# Patient Record
Sex: MALE | Race: WHITE | NOT HISPANIC OR LATINO | Employment: OTHER | URBAN - METROPOLITAN AREA
[De-identification: names, ages, dates, MRNs, and addresses within clinical notes are randomized per-mention and may not be internally consistent; named-entity substitution may affect disease eponyms.]

---

## 2017-01-05 ENCOUNTER — TRANSFERRED RECORDS (OUTPATIENT)
Dept: HEALTH INFORMATION MANAGEMENT | Facility: CLINIC | Age: 57
End: 2017-01-05

## 2017-10-11 DIAGNOSIS — M1A.9XX0 CHRONIC GOUT INVOLVING TOE WITHOUT TOPHUS, UNSPECIFIED CAUSE, UNSPECIFIED LATERALITY: ICD-10-CM

## 2017-10-11 NOTE — TELEPHONE ENCOUNTER
allopurinol (ZYLOPRIM) 300 MG tablet       Last Written Prescription Date: 10/10/2016  Last Fill Quantity: 90 tablet, # refills: 3  Last Office Visit with G, P or [facility] prescribing provider:  10/10/2016        Uric Acid   Date Value Ref Range Status   03/13/2015 4.5 3.5 - 7.2 mg/dL Final     Comment:     Effective 7/30/2014, the reference range for this assay has changed to reflect   new instrumentation/methodology.     ]  Creatinine   Date Value Ref Range Status   03/13/2015 0.95 0.66 - 1.25 mg/dL Final   ]  Lab Results   Component Value Date    WBC 6.1 10/21/2015     Lab Results   Component Value Date    RBC 4.53 10/21/2015     Lab Results   Component Value Date    HGB 10.6 10/21/2015     Lab Results   Component Value Date    HCT 35.4 10/21/2015     No components found for: MCT  Lab Results   Component Value Date    MCV 78 10/21/2015     Lab Results   Component Value Date    MCH 23.4 10/21/2015     Lab Results   Component Value Date    MCHC 29.9 10/21/2015     Lab Results   Component Value Date    RDW 17.7 10/21/2015     Lab Results   Component Value Date     10/21/2015     No results found for: AST  No results found for: ALT

## 2017-10-12 RX ORDER — ALLOPURINOL 300 MG/1
TABLET ORAL
Qty: 90 TABLET | Refills: 0 | Status: SHIPPED | OUTPATIENT
Start: 2017-10-12 | End: 2018-01-04

## 2017-12-07 DIAGNOSIS — N52.9 ERECTILE DYSFUNCTION, UNSPECIFIED ERECTILE DYSFUNCTION TYPE: ICD-10-CM

## 2017-12-07 DIAGNOSIS — I10 ESSENTIAL HYPERTENSION, BENIGN: ICD-10-CM

## 2017-12-07 RX ORDER — SILDENAFIL CITRATE 100 MG
TABLET ORAL
Qty: 6 TABLET | Refills: 0 | Status: SHIPPED | OUTPATIENT
Start: 2017-12-07 | End: 2018-01-04

## 2017-12-07 RX ORDER — LISINOPRIL 10 MG/1
TABLET ORAL
Qty: 30 TABLET | Refills: 0 | Status: SHIPPED | OUTPATIENT
Start: 2017-12-07 | End: 2018-01-04

## 2017-12-26 ENCOUNTER — OFFICE VISIT (OUTPATIENT)
Dept: FAMILY MEDICINE | Facility: CLINIC | Age: 57
End: 2017-12-26
Payer: COMMERCIAL

## 2017-12-26 VITALS
HEART RATE: 68 BPM | HEIGHT: 71 IN | WEIGHT: 266 LBS | RESPIRATION RATE: 18 BRPM | OXYGEN SATURATION: 97 % | SYSTOLIC BLOOD PRESSURE: 150 MMHG | TEMPERATURE: 97.7 F | BODY MASS INDEX: 37.24 KG/M2 | DIASTOLIC BLOOD PRESSURE: 100 MMHG

## 2017-12-26 DIAGNOSIS — R05.9 COUGH: ICD-10-CM

## 2017-12-26 DIAGNOSIS — J01.90 ACUTE SINUSITIS WITH SYMPTOMS > 10 DAYS: Primary | ICD-10-CM

## 2017-12-26 LAB
FLUAV+FLUBV AG SPEC QL: NEGATIVE
FLUAV+FLUBV AG SPEC QL: NEGATIVE
SPECIMEN SOURCE: NORMAL

## 2017-12-26 PROCEDURE — 87804 INFLUENZA ASSAY W/OPTIC: CPT | Performed by: FAMILY MEDICINE

## 2017-12-26 PROCEDURE — 99213 OFFICE O/P EST LOW 20 MIN: CPT | Performed by: FAMILY MEDICINE

## 2017-12-26 NOTE — PATIENT INSTRUCTIONS
Sinusitis (Antibiotic Treatment)    The sinuses are air-filled spaces within the bones of the face. They connect to the inside of the nose. Sinusitis is an inflammation of the tissue lining the sinus cavity. Sinus inflammation can occur during a cold. It can also be due to allergies to pollens and other particles in the air. Sinusitis can cause symptoms of sinus congestion and fullness. A sinus infection causes fever, headache and facial pain. There is often green or yellow drainage from the nose or into the back of the throat (post-nasal drip). You have been given antibiotics to treat this condition.  Home care:    Take the full course of antibiotics as instructed. Do not stop taking them, even if you feel better.    Drink plenty of water, hot tea, and other liquids. This may help thin mucus. It also may promote sinus drainage.    Heat may help soothe painful areas of the face. Use a towel soaked in hot water. Or,  the shower and direct the hot spray onto your face. Using a vaporizer along with a menthol rub at night may also help.     An expectorant containing guaifenesin may help thin the mucus and promote drainage from the sinuses.    Over-the-counter decongestants may be used unless a similar medicine was prescribed. Nasal sprays work the fastest. Use one that contains phenylephrine or oxymetazoline. First blow the nose gently. Then use the spray. Do not use these medicines more often than directed on the label or symptoms may get worse. You may also use tablets containing pseudoephedrine. Avoid products that combine ingredients, because side effects may be increased. Read labels. You can also ask the pharmacist for help. (NOTE: Persons with high blood pressure should not use decongestants. They can raise blood pressure.)    Over-the-counter antihistamines may help if allergies contributed to your sinusitis.      Do not use nasal rinses or irrigation during an acute sinus infection, unless told to by  your health care provider. Rinsing may spread the infection to other sinuses.    Use acetaminophen or ibuprofen to control pain, unless another pain medicine was prescribed. (If you have chronic liver or kidney disease or ever had a stomach ulcer, talk with your doctor before using these medicines. Aspirin should never be used in anyone under 18 years of age who is ill with a fever. It may cause severe liver damage.)    Don't smoke. This can worsen symptoms.  Follow-up care  Follow up with your healthcare provider or our staff if you are not improving within the next week.  When to seek medical advice  Call your healthcare provider if any of these occur:    Facial pain or headache becoming more severe    Stiff neck    Unusual drowsiness or confusion    Swelling of the forehead or eyelids    Vision problems, including blurred or double vision    Fever of 100.4 F (38 C) or higher, or as directed by your healthcare provider    Seizure    Breathing problems    Symptoms not resolving within 10 days  Date Last Reviewed: 4/13/2015 2000-2017 [employer]. [address] [place] [address]. All rights reserved. This information is not intended as a substitute for professional medical care. Always follow your healthcare professional's instructions.        Amoxicillin; Clavulanic Acid tablets  Brand Name: Augmentin  What is this medicine?  AMOXICILLIN; CLAVULANIC ACID (a mox i LAWANDA in; SUZIE silveira ic AS id) is a penicillin antibiotic. It is used to treat certain kinds of bacterial infections. It will not work for colds, flu, or other viral infections.  How should I use this medicine?  Take this medicine by mouth with a full glass of water. Follow the directions on the prescription label. Take at the start of a meal. Do not crush or chew. If the tablet has a score line, you may cut it in half at the score line for easier swallowing. Take your medicine at regular intervals. Do not take your medicine more  often than directed. Take all of your medicine as directed even if you think you are better. Do not skip doses or stop your medicine early.  Talk to your pediatrician regarding the use of this medicine in children. Special care may be needed.  What side effects may I notice from receiving this medicine?  Side effects that you should report to your doctor or health care professional as soon as possible:    allergic reactions like skin rash, itching or hives, swelling of the face, lips, or tongue    breathing problems    dark urine    fever or chills, sore throat    redness, blistering, peeling or loosening of the skin, including inside the mouth    seizures    trouble passing urine or change in the amount of urine    unusual bleeding, bruising    unusually weak or tired    white patches or sores in the mouth or throat  Side effects that usually do not require medical attention (report to your doctor or health care professional if they continue or are bothersome):    diarrhea    dizziness    headache    nausea, vomiting    stomach upset    vaginal or anal irritation  What may interact with this medicine?    allopurinol    anticoagulants    birth control pills    methotrexate    probenecid  What if I miss a dose?  If you miss a dose, take it as soon as you can. If it is almost time for your next dose, take only that dose. Do not take double or extra doses.  Where should I keep my medicine?  Keep out of the reach of children.  Store at room temperature below 25 degrees C (77 degrees F). Keep container tightly closed. Throw away any unused medicine after the expiration date.  What should I tell my health care provider before I take this medicine?  They need to know if you have any of these conditions:    bowel disease, like colitis    kidney disease    liver disease    mononucleosis    an unusual or allergic reaction to amoxicillin, penicillin, cephalosporin, other antibiotics, clavulanic acid, other medicines, foods,  dyes, or preservatives    pregnant or trying to get pregnant    breast-feeding  What should I watch for while using this medicine?  Tell your doctor or health care professional if your symptoms do not improve.  Do not treat diarrhea with over the counter products. Contact your doctor if you have diarrhea that lasts more than 2 days or if it is severe and watery.  If you have diabetes, you may get a false-positive result for sugar in your urine. Check with your doctor or health care professional.  Birth control pills may not work properly while you are taking this medicine. Talk to your doctor about using an extra method of birth control.  NOTE:This sheet is a summary. It may not cover all possible information. If you have questions about this medicine, talk to your doctor, pharmacist, or health care provider. Copyright  2017 [employer]

## 2017-12-26 NOTE — NURSING NOTE
"Chief Complaint   Patient presents with     URI       Initial BP (!) 150/100 (Cuff Size: Adult Regular)  Pulse 68  Temp 97.7  F (36.5  C) (Tympanic)  Resp 18  Ht 5' 11\" (1.803 m)  Wt 266 lb (120.7 kg)  SpO2 97%  BMI 37.1 kg/m2 Estimated body mass index is 37.1 kg/(m^2) as calculated from the following:    Height as of this encounter: 5' 11\" (1.803 m).    Weight as of this encounter: 266 lb (120.7 kg).  Medication Reconciliation: complete    Health Maintenance that is potentially due pending provider review:  NONE    n/a    Is there anyone who you would like to be able to receive your results? Not Applicable  If yes have patient fill out CECILIO    "

## 2017-12-26 NOTE — MR AVS SNAPSHOT
After Visit Summary   12/26/2017    Shon Sargent    MRN: [id number]           Patient Information     Date Of Birth          1960        Visit Information        Provider Department      12/26/2017 8:20 AM Oral Christensen MD [facility]        Today's Diagnoses     Cough    -  1    Acute sinusitis with symptoms > 10 days          Care Instructions      Sinusitis (Antibiotic Treatment)    The sinuses are air-filled spaces within the bones of the face. They connect to the inside of the nose. Sinusitis is an inflammation of the tissue lining the sinus cavity. Sinus inflammation can occur during a cold. It can also be due to allergies to pollens and other particles in the air. Sinusitis can cause symptoms of sinus congestion and fullness. A sinus infection causes fever, headache and facial pain. There is often green or yellow drainage from the nose or into the back of the throat (post-nasal drip). You have been given antibiotics to treat this condition.  Home care:    Take the full course of antibiotics as instructed. Do not stop taking them, even if you feel better.    Drink plenty of water, hot tea, and other liquids. This may help thin mucus. It also may promote sinus drainage.    Heat may help soothe painful areas of the face. Use a towel soaked in hot water. Or,  the shower and direct the hot spray onto your face. Using a vaporizer along with a menthol rub at night may also help.     An expectorant containing guaifenesin may help thin the mucus and promote drainage from the sinuses.    Over-the-counter decongestants may be used unless a similar medicine was prescribed. Nasal sprays work the fastest. Use one that contains phenylephrine or oxymetazoline. First blow the nose gently. Then use the spray. Do not use these medicines more often than directed on the label or symptoms may get worse. You may also use tablets containing pseudoephedrine. Avoid products that combine  ingredients, because side effects may be increased. Read labels. You can also ask the pharmacist for help. (NOTE: Persons with high blood pressure should not use decongestants. They can raise blood pressure.)    Over-the-counter antihistamines may help if allergies contributed to your sinusitis.      Do not use nasal rinses or irrigation during an acute sinus infection, unless told to by your health care provider. Rinsing may spread the infection to other sinuses.    Use acetaminophen or ibuprofen to control pain, unless another pain medicine was prescribed. (If you have chronic liver or kidney disease or ever had a stomach ulcer, talk with your doctor before using these medicines. Aspirin should never be used in anyone under 18 years of age who is ill with a fever. It may cause severe liver damage.)    Don't smoke. This can worsen symptoms.  Follow-up care  Follow up with your healthcare provider or our staff if you are not improving within the next week.  When to seek medical advice  Call your healthcare provider if any of these occur:    Facial pain or headache becoming more severe    Stiff neck    Unusual drowsiness or confusion    Swelling of the forehead or eyelids    Vision problems, including blurred or double vision    Fever of 100.4 F (38 C) or higher, or as directed by your healthcare provider    Seizure    Breathing problems    Symptoms not resolving within 10 days  Date Last Reviewed: 4/13/2015 2000-2017 [employer]. [address] [place] [address], [place] [address]. All rights reserved. This information is not intended as a substitute for professional medical care. Always follow your healthcare professional's instructions.        Amoxicillin; Clavulanic Acid tablets  Brand Name: Augmentin  What is this medicine?  AMOXICILLIN; CLAVULANIC ACID (a mox i LAWANDA in; SUZIE cisse AS id) is a penicillin antibiotic. It is used to treat certain kinds of bacterial infections. It will not work for colds,  flu, or other viral infections.  How should I use this medicine?  Take this medicine by mouth with a full glass of water. Follow the directions on the prescription label. Take at the start of a meal. Do not crush or chew. If the tablet has a score line, you may cut it in half at the score line for easier swallowing. Take your medicine at regular intervals. Do not take your medicine more often than directed. Take all of your medicine as directed even if you think you are better. Do not skip doses or stop your medicine early.  Talk to your pediatrician regarding the use of this medicine in children. Special care may be needed.  What side effects may I notice from receiving this medicine?  Side effects that you should report to your doctor or health care professional as soon as possible:    allergic reactions like skin rash, itching or hives, swelling of the face, lips, or tongue    breathing problems    dark urine    fever or chills, sore throat    redness, blistering, peeling or loosening of the skin, including inside the mouth    seizures    trouble passing urine or change in the amount of urine    unusual bleeding, bruising    unusually weak or tired    white patches or sores in the mouth or throat  Side effects that usually do not require medical attention (report to your doctor or health care professional if they continue or are bothersome):    diarrhea    dizziness    headache    nausea, vomiting    stomach upset    vaginal or anal irritation  What may interact with this medicine?    allopurinol    anticoagulants    birth control pills    methotrexate    probenecid  What if I miss a dose?  If you miss a dose, take it as soon as you can. If it is almost time for your next dose, take only that dose. Do not take double or extra doses.  Where should I keep my medicine?  Keep out of the reach of children.  Store at room temperature below 25 degrees C (77 degrees F). Keep container tightly closed. Throw away any  unused medicine after the expiration date.  What should I tell my health care provider before I take this medicine?  They need to know if you have any of these conditions:    bowel disease, like colitis    kidney disease    liver disease    mononucleosis    an unusual or allergic reaction to amoxicillin, penicillin, cephalosporin, other antibiotics, clavulanic acid, other medicines, foods, dyes, or preservatives    pregnant or trying to get pregnant    breast-feeding  What should I watch for while using this medicine?  Tell your doctor or health care professional if your symptoms do not improve.  Do not treat diarrhea with over the counter products. Contact your doctor if you have diarrhea that lasts more than 2 days or if it is severe and watery.  If you have diabetes, you may get a false-positive result for sugar in your urine. Check with your doctor or health care professional.  Birth control pills may not work properly while you are taking this medicine. Talk to your doctor about using an extra method of birth control.  NOTE:This sheet is a summary. It may not cover all possible information. If you have questions about this medicine, talk to your doctor, pharmacist, or health care provider. Copyright  2017 [employer]                Follow-ups after your visit        Who to contact     If you have questions or need follow up information about today's clinic visit or your schedule please contact [facility] directly at [phone].  Normal or non-critical lab and imaging results will be communicated to you by MyChart, letter or phone within 4 business days after the clinic has received the results. If you do not hear from us within 7 days, please contact the clinic through [employer] or phone. If you have a critical or abnormal lab result, we will notify you by phone as soon as possible.  Submit refill requests through [employer] or call your pharmacy and they will forward the refill request to us. Please  "allow 3 business days for your refill to be completed.          Additional Information About Your Visit        [employer] Information     [employer] gives you secure access to your electronic health record. If you see a primary care provider, you can also send messages to your care team and make appointments. If you have questions, please call your primary care clinic.  If you do not have a primary care provider, please call [phone] and they will assist you.        Care EveryWhere ID     This is your Care EveryWhere ID. This could be used by other organizations to access your [facility] medical records  [id number]        Your Vitals Were     Pulse Temperature Respirations Height Pulse Oximetry BMI (Body Mass Index)    68 97.7  F (36.5  C) (Tympanic) 18 5' 11\" (1.803 m) 97% 37.1 kg/m2       Blood Pressure from Last 3 Encounters:   12/26/17 (!) 150/100   10/10/16 130/86   09/23/16 140/90    Weight from Last 3 Encounters:   12/26/17 266 lb (120.7 kg)   10/10/16 253 lb (114.8 kg)   09/23/16 258 lb 3.2 oz (117.1 kg)              We Performed the Following     Influenza A/B antigen          Today's Medication Changes          These changes are accurate as of: 12/26/17  9:08 AM.  If you have any questions, ask your nurse or doctor.               Start taking these medicines.        Dose/Directions    amoxicillin-clavulanate 875-125 MG per tablet   Commonly known as:  AUGMENTIN   Used for:  Acute sinusitis with symptoms > 10 days   Started by:  Oral Christensen MD        Dose:  1 tablet   Take 1 tablet by mouth 2 times daily   Quantity:  20 tablet   Refills:  0         Stop taking these medicines if you haven't already. Please contact your care team if you have questions.     betamethasone dipropionate 0.05 % cream   Commonly known as:  DIPROSONE   Stopped by:  Oral Christensen MD                Where to get your medicines      These medications were sent to [facility] [address] [place] [address] " [facility] [address]     Phone:  [phone]     amoxicillin-clavulanate 875-125 MG per tablet                Primary Care Provider Office Phone # Fax #    Andrea Sandhu [phone] [address] [address]        Equal Access to Services     CHAPARRITA CORDON : Hadii aad ku hadasho Soomaali, waaxda luqadaha, qaybta kaalmada adeegyada, waxay akilin hayaan adelubna khsamanthakiara laankit . So [facility] [phone].    ATENCIÓN: Si habla español, tiene a daniel disposición servicios gratuitos de asistencia lingüística. [facility] [phone].    We comply with applicable federal civil rights laws and [place] laws. We do not discriminate on the basis of race, color, national origin, age, disability, sex, sexual orientation, or gender identity.            Thank you!     Thank you for choosing [facility]  for your care. Our goal is always to provide you with excellent care. Hearing back from our patients is one way we can continue to improve our services. Please take a few minutes to complete the written survey that you may receive in the mail after your visit with us. Thank you!             Your Updated Medication List - Protect others around you: Learn how to safely use, store and throw away your medicines at www.disposemymeds.org.          This list is accurate as of: 12/26/17  9:08 AM.  Always use your most recent med list.                   Brand Name Dispense Instructions for use Diagnosis    albuterol 108 (90 BASE) MCG/ACT Inhaler    PROAIR HFA/PROVENTIL HFA/VENTOLIN HFA    1 Inhaler    Inhale 2 puffs into the lungs every 6 hours as needed for shortness of breath / dyspnea or wheezing    Acute bronchitis, unspecified organism       allopurinol 300 MG tablet    ZYLOPRIM    90 tablet    TAKE ONE TABLET BY MOUTH ONCE DAILY    Chronic gout involving toe without tophus, unspecified cause, unspecified laterality       amoxicillin-clavulanate 875-125 MG per tablet    AUGMENTIN     20 tablet    Take 1 tablet by mouth 2 times daily    Acute sinusitis with symptoms > 10 days       ferrous gluconate 325 (36 FE) MG Tabs      Take 1 tablet by mouth daily        lisinopril 10 MG tablet    PRINIVIL/ZESTRIL    30 tablet    TAKE ONE TABLET BY MOUTH ONCE DAILY    Essential hypertension, benign       omeprazole 40 MG capsule    priLOSEC    90 capsule    Take 1 capsule (40 mg) by mouth daily Take 30-60 minutes before a meal.    Gastroesophageal reflux disease without esophagitis, Gastrointestinal hemorrhage, unspecified gastrointestinal hemorrhage type       VIAGRA 100 MG tablet   Generic drug:  sildenafil     6 tablet    TAKE ONE TABLET BY MOUTH ONCE DAILY AS NEEDED    Erectile dysfunction, unspecified erectile dysfunction type

## 2017-12-26 NOTE — PROGRESS NOTES
SUBJECTIVE:   Shon Sargent is a 57 year old male who presents to clinic today for the following health issues:      RESPIRATORY SYMPTOMS      Duration: 10 days     Description  nasal congestion, rhinorrhea, facial pain/pressure, cough, wheezing, fever, chills, ear pain both, headache and hoarse voice    Severity: moderate    Accompanying signs and symptoms: None    History (predisposing factors):  Kids have sinus and ear infections     Precipitating or alleviating factors: None    Therapies tried and outcome:  rest and fluids, mucinex, advil, inhaler         Problem list and histories reviewed & adjusted, as indicated.  Additional history: as documented    Patient Active Problem List   Diagnosis     Hypertension goal BP (blood pressure) < 140/90     Tremor     Gout     ED (erectile dysfunction)     Psoriasis     Esophageal reflux (GERD)     CARDIOVASCULAR SCREENING; LDL GOAL LESS THAN 130     Iron deficiency anemia     Past Surgical History:   Procedure Laterality Date     COLONOSCOPY N/A 10/8/2015    Procedure: COLONOSCOPY;  Surgeon: Rolando Ayers MD;  Location: [facility]     ESOPHAGOSCOPY, GASTROSCOPY, DUODENOSCOPY (EGD), COMBINED N/A 11/3/2014    Procedure: COMBINED ESOPHAGOSCOPY, GASTROSCOPY, DUODENOSCOPY (EGD);  Surgeon: Rolando Ayers MD;  Location: [facility]     ESOPHAGOSCOPY, GASTROSCOPY, DUODENOSCOPY (EGD), COMBINED N/A 10/8/2015    Procedure: COMBINED ESOPHAGOSCOPY, GASTROSCOPY, DUODENOSCOPY (EGD), BIOPSY SINGLE OR MULTIPLE;  Surgeon: Rolando Ayers MD;  Location: [facility]     HERNIA REPAIR  2007    umbilical       Social History   Substance Use Topics     Smoking status: Never Smoker     Smokeless tobacco: Never Used     Alcohol use Yes      Comment: occ'l     Family History   Problem Relation Age of Onset     CANCER Mother      lymphoma     Hypertension Father      Circulatory Father      GASTROINTESTINAL DISEASE Father      Lipids Father      Alzheimer Disease Maternal Grandfather      Anesthesia Reaction  "Maternal Grandfather      Cardiovascular Paternal Grandfather      HEART DISEASE Paternal Grandfather      Asthma Son      Asthma Son          Current Outpatient Prescriptions   Medication Sig Dispense Refill     lisinopril (PRINIVIL/ZESTRIL) 10 MG tablet TAKE ONE TABLET BY MOUTH ONCE DAILY 30 tablet 0     allopurinol (ZYLOPRIM) 300 MG tablet TAKE ONE TABLET BY MOUTH ONCE DAILY 90 tablet 0     omeprazole (PRILOSEC) 40 MG capsule Take 1 capsule (40 mg) by mouth daily Take 30-60 minutes before a meal. 90 capsule 3     albuterol (PROAIR HFA, PROVENTIL HFA, VENTOLIN HFA) 108 (90 BASE) MCG/ACT inhaler Inhale 2 puffs into the lungs every 6 hours as needed for shortness of breath / dyspnea or wheezing 1 Inhaler 0     ferrous gluconate 325 (36 FE) MG TABS Take 1 tablet by mouth daily       VIAGRA 100 MG tablet TAKE ONE TABLET BY MOUTH ONCE DAILY AS NEEDED (Patient not taking: Reported on 12/26/2017) 6 tablet 0     Allergies   Allergen Reactions     Valdecoxib Hives     Recent Labs   Lab Test  03/13/15   0848   LDL  64   HDL  28*   TRIG  163*   CR  0.95   GFRESTIMATED  83   GFRESTBLACK  >90   GFR Calc     POTASSIUM  4.7      BP Readings from Last 3 Encounters:   12/26/17 (!) 150/100   10/10/16 130/86   09/23/16 140/90    Wt Readings from Last 3 Encounters:   12/26/17 266 lb (120.7 kg)   10/10/16 253 lb (114.8 kg)   09/23/16 258 lb 3.2 oz (117.1 kg)                  Labs reviewed in EPIC          Reviewed and updated as needed this visit by clinical staff     Reviewed and updated as needed this visit by Provider         ROS:  Constitutional, HEENT, cardiovascular, pulmonary, gi and gu systems are negative, except as otherwise noted.      OBJECTIVE:   BP (!) 150/100 (Cuff Size: Adult Regular)  Pulse 68  Temp 97.7  F (36.5  C) (Tympanic)  Resp 18  Ht 5' 11\" (1.803 m)  Wt 266 lb (120.7 kg)  SpO2 97%  BMI 37.1 kg/m2  Body mass index is 37.1 kg/(m^2).  GENERAL: alert, no distress and obese  EYES: Eyes " grossly normal to inspection, PERRL and conjunctivae and sclerae normal  HENT: normal cephalic/atraumatic, ear canals and TM's normal, nasal mucosa edematous , oral mucous membranes moist, oropharxnx crowded and sinuses: maxillary pressure   NECK: no adenopathy, no asymmetry, masses, or scars and thyroid normal to palpation  RESP: lungs clear to auscultation - no rales, rhonchi or wheezes  CV: regular rates and rhythm, normal S1 S2, no S3 or S4 and no murmur, click or rub  ABDOMEN: soft, nontender, no hepatosplenomegaly, no masses and bowel sounds normal  MS: no gross musculoskeletal defects noted, no edema  NEURO: Normal strength and tone, mentation intact and speech normal  LYMPH: no cervical, supraclavicular, axillary, or inguinal adenopathy    Results for orders placed or performed in visit on 12/26/17   Influenza A/B antigen   Result Value Ref Range    Influenza A/B Agn Specimen Nasal     Influenza A Negative NEG^Negative    Influenza B Negative NEG^Negative         ASSESSMENT/PLAN:       ICD-10-CM    1. Acute sinusitis with symptoms > 10 days J01.90 amoxicillin-clavulanate (AUGMENTIN) 875-125 MG per tablet   2. Cough R05 Influenza A/B antigen       Discussed in detail differentials and further management. Symptoms are likely secondary to acute sinusitis. Augmentin prescribed, common side effects discussed. Recommended well hydration, over-the-counter analgesia, warm fluids. Written instructions/information provided. Patient understood and in agreement with the above plan. All questions are answered. Follow-up if symptoms persist or worsen.        Patient Instructions     Sinusitis (Antibiotic Treatment)    The sinuses are air-filled spaces within the bones of the face. They connect to the inside of the nose. Sinusitis is an inflammation of the tissue lining the sinus cavity. Sinus inflammation can occur during a cold. It can also be due to allergies to pollens and other particles in the air. Sinusitis can  cause symptoms of sinus congestion and fullness. A sinus infection causes fever, headache and facial pain. There is often green or yellow drainage from the nose or into the back of the throat (post-nasal drip). You have been given antibiotics to treat this condition.  Home care:    Take the full course of antibiotics as instructed. Do not stop taking them, even if you feel better.    Drink plenty of water, hot tea, and other liquids. This may help thin mucus. It also may promote sinus drainage.    Heat may help soothe painful areas of the face. Use a towel soaked in hot water. Or,  the shower and direct the hot spray onto your face. Using a vaporizer along with a menthol rub at night may also help.     An expectorant containing guaifenesin may help thin the mucus and promote drainage from the sinuses.    Over-the-counter decongestants may be used unless a similar medicine was prescribed. Nasal sprays work the fastest. Use one that contains phenylephrine or oxymetazoline. First blow the nose gently. Then use the spray. Do not use these medicines more often than directed on the label or symptoms may get worse. You may also use tablets containing pseudoephedrine. Avoid products that combine ingredients, because side effects may be increased. Read labels. You can also ask the pharmacist for help. (NOTE: Persons with high blood pressure should not use decongestants. They can raise blood pressure.)    Over-the-counter antihistamines may help if allergies contributed to your sinusitis.      Do not use nasal rinses or irrigation during an acute sinus infection, unless told to by your health care provider. Rinsing may spread the infection to other sinuses.    Use acetaminophen or ibuprofen to control pain, unless another pain medicine was prescribed. (If you have chronic liver or kidney disease or ever had a stomach ulcer, talk with your doctor before using these medicines. Aspirin should never be used in anyone  under 18 years of age who is ill with a fever. It may cause severe liver damage.)    Don't smoke. This can worsen symptoms.  Follow-up care  Follow up with your healthcare provider or our staff if you are not improving within the next week.  When to seek medical advice  Call your healthcare provider if any of these occur:    Facial pain or headache becoming more severe    Stiff neck    Unusual drowsiness or confusion    Swelling of the forehead or eyelids    Vision problems, including blurred or double vision    Fever of 100.4 F (38 C) or higher, or as directed by your healthcare provider    Seizure    Breathing problems    Symptoms not resolving within 10 days  Date Last Reviewed: 4/13/2015 2000-2017 [employer]. [address] [place] [address], [place] [address]. All rights reserved. This information is not intended as a substitute for professional medical care. Always follow your healthcare professional's instructions.        Amoxicillin; Clavulanic Acid tablets  Brand Name: Augmentin  What is this medicine?  AMOXICILLIN; CLAVULANIC ACID (a mox i LAWANDA in; SUZIE silveira ic AS id) is a penicillin antibiotic. It is used to treat certain kinds of bacterial infections. It will not work for colds, flu, or other viral infections.  How should I use this medicine?  Take this medicine by mouth with a full glass of water. Follow the directions on the prescription label. Take at the start of a meal. Do not crush or chew. If the tablet has a score line, you may cut it in half at the score line for easier swallowing. Take your medicine at regular intervals. Do not take your medicine more often than directed. Take all of your medicine as directed even if you think you are better. Do not skip doses or stop your medicine early.  Talk to your pediatrician regarding the use of this medicine in children. Special care may be needed.  What side effects may I notice from receiving this medicine?  Side effects that you should  report to your doctor or health care professional as soon as possible:    allergic reactions like skin rash, itching or hives, swelling of the face, lips, or tongue    breathing problems    dark urine    fever or chills, sore throat    redness, blistering, peeling or loosening of the skin, including inside the mouth    seizures    trouble passing urine or change in the amount of urine    unusual bleeding, bruising    unusually weak or tired    white patches or sores in the mouth or throat  Side effects that usually do not require medical attention (report to your doctor or health care professional if they continue or are bothersome):    diarrhea    dizziness    headache    nausea, vomiting    stomach upset    vaginal or anal irritation  What may interact with this medicine?    allopurinol    anticoagulants    birth control pills    methotrexate    probenecid  What if I miss a dose?  If you miss a dose, take it as soon as you can. If it is almost time for your next dose, take only that dose. Do not take double or extra doses.  Where should I keep my medicine?  Keep out of the reach of children.  Store at room temperature below 25 degrees C (77 degrees F). Keep container tightly closed. Throw away any unused medicine after the expiration date.  What should I tell my health care provider before I take this medicine?  They need to know if you have any of these conditions:    bowel disease, like colitis    kidney disease    liver disease    mononucleosis    an unusual or allergic reaction to amoxicillin, penicillin, cephalosporin, other antibiotics, clavulanic acid, other medicines, foods, dyes, or preservatives    pregnant or trying to get pregnant    breast-feeding  What should I watch for while using this medicine?  Tell your doctor or health care professional if your symptoms do not improve.  Do not treat diarrhea with over the counter products. Contact your doctor if you have diarrhea that lasts more than 2 days or  if it is severe and watery.  If you have diabetes, you may get a false-positive result for sugar in your urine. Check with your doctor or health care professional.  Birth control pills may not work properly while you are taking this medicine. Talk to your doctor about using an extra method of birth control.  NOTE:This sheet is a summary. It may not cover all possible information. If you have questions about this medicine, talk to your doctor, pharmacist, or health care provider. Copyright  2017 [employer]            Oral Christensen MD  [facility]

## 2018-01-04 ENCOUNTER — OFFICE VISIT (OUTPATIENT)
Dept: FAMILY MEDICINE | Facility: CLINIC | Age: 58
End: 2018-01-04
Payer: COMMERCIAL

## 2018-01-04 VITALS
HEIGHT: 71 IN | HEART RATE: 84 BPM | BODY MASS INDEX: 37.55 KG/M2 | TEMPERATURE: 97.6 F | WEIGHT: 268.2 LBS | SYSTOLIC BLOOD PRESSURE: 150 MMHG | DIASTOLIC BLOOD PRESSURE: 94 MMHG | RESPIRATION RATE: 20 BRPM

## 2018-01-04 DIAGNOSIS — R05.9 COUGH: ICD-10-CM

## 2018-01-04 DIAGNOSIS — K21.9 GASTROESOPHAGEAL REFLUX DISEASE WITHOUT ESOPHAGITIS: ICD-10-CM

## 2018-01-04 DIAGNOSIS — Z11.59 NEED FOR HEPATITIS C SCREENING TEST: ICD-10-CM

## 2018-01-04 DIAGNOSIS — N52.9 ERECTILE DYSFUNCTION, UNSPECIFIED ERECTILE DYSFUNCTION TYPE: ICD-10-CM

## 2018-01-04 DIAGNOSIS — I10 ESSENTIAL HYPERTENSION, BENIGN: Primary | ICD-10-CM

## 2018-01-04 DIAGNOSIS — M1A.9XX0 CHRONIC GOUT INVOLVING TOE WITHOUT TOPHUS, UNSPECIFIED CAUSE, UNSPECIFIED LATERALITY: ICD-10-CM

## 2018-01-04 DIAGNOSIS — Z13.6 CARDIOVASCULAR SCREENING; LDL GOAL LESS THAN 130: ICD-10-CM

## 2018-01-04 LAB
ALT SERPL W P-5'-P-CCNC: 80 U/L (ref 0–70)
ANION GAP SERPL CALCULATED.3IONS-SCNC: 6 MMOL/L (ref 3–14)
BUN SERPL-MCNC: 17 MG/DL (ref 7–30)
CALCIUM SERPL-MCNC: 8.7 MG/DL (ref 8.5–10.1)
CHLORIDE SERPL-SCNC: 106 MMOL/L (ref 94–109)
CHOLEST SERPL-MCNC: 193 MG/DL
CO2 SERPL-SCNC: 28 MMOL/L (ref 20–32)
CREAT SERPL-MCNC: 0.9 MG/DL (ref 0.66–1.25)
ERYTHROCYTE [DISTWIDTH] IN BLOOD BY AUTOMATED COUNT: 14.9 % (ref 10–15)
GFR SERPL CREATININE-BSD FRML MDRD: 88 ML/MIN/1.7M2
GLUCOSE SERPL-MCNC: 90 MG/DL (ref 70–99)
HCT VFR BLD AUTO: 45.6 % (ref 40–53)
HDLC SERPL-MCNC: 33 MG/DL
HGB BLD-MCNC: 14.6 G/DL (ref 13.3–17.7)
LDLC SERPL CALC-MCNC: 93 MG/DL
MCH RBC QN AUTO: 27.3 PG (ref 26.5–33)
MCHC RBC AUTO-ENTMCNC: 32 G/DL (ref 31.5–36.5)
MCV RBC AUTO: 85 FL (ref 78–100)
NONHDLC SERPL-MCNC: 160 MG/DL
PLATELET # BLD AUTO: 189 10E9/L (ref 150–450)
POTASSIUM SERPL-SCNC: 4.3 MMOL/L (ref 3.4–5.3)
RBC # BLD AUTO: 5.35 10E12/L (ref 4.4–5.9)
SODIUM SERPL-SCNC: 140 MMOL/L (ref 133–144)
TRIGL SERPL-MCNC: 336 MG/DL
WBC # BLD AUTO: 9.2 10E9/L (ref 4–11)

## 2018-01-04 PROCEDURE — 80048 BASIC METABOLIC PNL TOTAL CA: CPT | Performed by: FAMILY MEDICINE

## 2018-01-04 PROCEDURE — 36415 COLL VENOUS BLD VENIPUNCTURE: CPT | Performed by: FAMILY MEDICINE

## 2018-01-04 PROCEDURE — 99214 OFFICE O/P EST MOD 30 MIN: CPT | Performed by: FAMILY MEDICINE

## 2018-01-04 PROCEDURE — 86803 HEPATITIS C AB TEST: CPT | Performed by: FAMILY MEDICINE

## 2018-01-04 PROCEDURE — 85027 COMPLETE CBC AUTOMATED: CPT | Performed by: FAMILY MEDICINE

## 2018-01-04 PROCEDURE — 80061 LIPID PANEL: CPT | Performed by: FAMILY MEDICINE

## 2018-01-04 PROCEDURE — 84460 ALANINE AMINO (ALT) (SGPT): CPT | Performed by: FAMILY MEDICINE

## 2018-01-04 RX ORDER — ALLOPURINOL 300 MG/1
1 TABLET ORAL DAILY
Qty: 90 TABLET | Refills: 3 | Status: SHIPPED | OUTPATIENT
Start: 2018-01-04 | End: 2019-01-18

## 2018-01-04 RX ORDER — SILDENAFIL CITRATE 20 MG/1
TABLET ORAL
Qty: 50 TABLET | Refills: 3 | Status: SHIPPED | OUTPATIENT
Start: 2018-01-04 | End: 2019-02-22

## 2018-01-04 RX ORDER — LISINOPRIL 20 MG/1
20 TABLET ORAL DAILY
Qty: 90 TABLET | Refills: 3 | Status: SHIPPED | OUTPATIENT
Start: 2018-01-04 | End: 2019-01-18

## 2018-01-04 RX ORDER — OMEPRAZOLE 40 MG/1
40 CAPSULE, DELAYED RELEASE ORAL DAILY
Qty: 90 CAPSULE | Refills: 3 | Status: SHIPPED | OUTPATIENT
Start: 2018-01-04 | End: 2018-07-10

## 2018-01-04 RX ORDER — ALBUTEROL SULFATE 90 UG/1
2 AEROSOL, METERED RESPIRATORY (INHALATION) EVERY 6 HOURS PRN
Qty: 1 INHALER | Refills: 11 | Status: SHIPPED | OUTPATIENT
Start: 2018-01-04 | End: 2019-12-27

## 2018-01-04 NOTE — PATIENT INSTRUCTIONS
ASSESSMENT:   (I10) Essential hypertension, benign  (primary encounter diagnosis)  Comment: high today  Plan: lisinopril (PRINIVIL/ZESTRIL) 20 MG tablet,         Basic metabolic panel        Increase lisinopril to 20mg once daily.   Monitor BP periodically.  This can be done at home, in clinic, at our pharmacy, at a store or by neighbor or relative with a blood pressure cuff.  You should be sitting and relaxed for several minutes when taking blood pressure.   Goal BP (most readings) should be less than 140/90    Normal blood pressure is 120/80.    If your blood pressure is consistently at or above the goal, some changes should be made with lifestyle or medication to keep blood pressure under good control.    High blood pressure over time can lead to eye and kidney damage and increase risk for heart attacks and strokes.   Let us know if readings are often high, so we can help get your blood pressure under good control.     (M1A.9XX0) Chronic gout involving toe without tophus, unspecified cause, unspecified laterality  Comment: has been doing well  Plan: allopurinol (ZYLOPRIM) 300 MG tablet, ALT, CBC         with platelets        REfills.     (K21.9) Gastroesophageal reflux disease without esophagitis  Comment:   Plan: omeprazole (PRILOSEC) 40 MG capsule        REfills.     (N52.9) Erectile dysfunction, unspecified erectile dysfunction type  Comment:   Plan: sildenafil (REVATIO) 20 MG tablet        Sidenafil (Viagra) comes in 20mg strength pills. Take as many pills as needed up to maximum of 5 pills at a time prior to intercourse.     (Z11.59) Need for hepatitis C screening test  Comment:   Plan: Hepatitis C antibody          (Z13.6) CARDIOVASCULAR SCREENING; LDL GOAL LESS THAN 130  Comment:   Plan: Lipid panel reflex to direct LDL Fasting          (R05) Cough  Comment:   Plan: albuterol (PROAIR HFA/PROVENTIL HFA/VENTOLIN         HFA) 108 (90 BASE) MCG/ACT Inhaler        Albuteral inhaler can be used taking 2  inhalations every 4 hours as needed for coughing, wheezing or shortness of breath.

## 2018-01-04 NOTE — PROGRESS NOTES
"  SUBJECTIVE:   Shon Sargent is a 57 year old male who presents to clinic today for the following health issues:  Chief Complaint   Patient presents with     Hypertension      Viagra not covered by insurance.     Hypertension Follow-up      Outpatient blood pressures are not being checked.    Low Salt Diet: 1 gram sodium    His blood pressure was high in UC on 12/26    HE has been taking lisinopril 10mg daily.       Amount of exercise or physical activity: 2-3 days/week for an average of greater than 60 minutes. Patient states his job is pretty physical.    Problems taking medications regularly: No    Medication side effects: none    Diet: regular (no restrictions)    Patient Active Problem List   Diagnosis     Hypertension goal BP (blood pressure) < 140/90     Tremor     Gout     ED (erectile dysfunction)     Psoriasis     Esophageal reflux (GERD)     CARDIOVASCULAR SCREENING; LDL GOAL LESS THAN 130     Iron deficiency anemia      ROS:General: No change in weight, sleep or appetite.  Normal energy.  No fever or chills  He asks about Medifast.   Drinking pop-4-5 cans per day.   Resp: POSITIVE for:, cough, URI  CV: No chest pains or palpitations  GI: No nausea, vomiting,  heartburn, abdominal pain, diarrhea, constipation or change in bowel habits  : No urinary frequency or dysuria, bladder or kidney problems  Musculoskeletal: POSITIVE  for:, pain in hip.  He has had injections in the past.  Was told he has osteoarthritis.  Psychiatric: No problems with anxiety, depression or mental health  Some nasal congestion in the past couple days.   He can get toe pain if he runs out of allopurinol.     OBJECTIVE:Blood pressure (!) 150/94, pulse 84, temperature 97.6  F (36.4  C), temperature source Tympanic, resp. rate 20, height 5' 11\" (1.803 m), weight 268 lb 3.2 oz (121.7 kg). BMI=Body mass index is 37.41 kg/(m^2).  GENERAL APPEARANCE ADULT: Alert, no acute distress, obese  NECK: No adenopathy,masses or " thyromegaly  RESP: lungs clear to auscultation   CV: normal rate, regular rhythm, no murmur or gallop  ABDOMEN: soft, no organomegaly, masses or tenderness  MS: extremities normal, no peripheral edema     ASSESSMENT:   (I10) Essential hypertension, benign  (primary encounter diagnosis)  Comment: high today  Plan: lisinopril (PRINIVIL/ZESTRIL) 20 MG tablet,         Basic metabolic panel        Increase lisinopril to 20mg once daily.   Monitor BP periodically.  This can be done at home, in clinic, at our pharmacy, at a store or by neighbor or relative with a blood pressure cuff.  You should be sitting and relaxed for several minutes when taking blood pressure.   Goal BP (most readings) should be less than 140/90    Normal blood pressure is 120/80.    If your blood pressure is consistently at or above the goal, some changes should be made with lifestyle or medication to keep blood pressure under good control.    High blood pressure over time can lead to eye and kidney damage and increase risk for heart attacks and strokes.   Let us know if readings are often high, so we can help get your blood pressure under good control.     (M1A.9XX0) Chronic gout involving toe without tophus, unspecified cause, unspecified laterality  Comment: has been doing well  Plan: allopurinol (ZYLOPRIM) 300 MG tablet, ALT, CBC         with platelets        REfills.     (K21.9) Gastroesophageal reflux disease without esophagitis  Comment:   Plan: omeprazole (PRILOSEC) 40 MG capsule        REfills.     (N52.9) Erectile dysfunction, unspecified erectile dysfunction type  Comment:   Plan: sildenafil (REVATIO) 20 MG tablet        Sidenafil (Viagra) comes in 20mg strength pills. Take as many pills as needed up to maximum of 5 pills at a time prior to intercourse.     (Z11.59) Need for hepatitis C screening test  Comment:   Plan: Hepatitis C antibody          (Z13.6) CARDIOVASCULAR SCREENING; LDL GOAL LESS THAN 130  Comment:   Plan: Lipid panel  reflex to direct LDL Fasting          (R05) Cough  Comment:   Plan: albuterol (PROAIR HFA/PROVENTIL HFA/VENTOLIN         HFA) 108 (90 BASE) MCG/ACT Inhaler        Albuteral inhaler can be used taking 2 inhalations every 4 hours as needed for coughing, wheezing or shortness of breath.

## 2018-01-04 NOTE — MR AVS SNAPSHOT
After Visit Summary   1/4/2018    Shon Sargent    MRN: [id number]           Patient Information     Date Of Birth          1960        Visit Information        Provider Department      1/4/2018 8:40 AM Andrea Sandhu MD [facility]        Today's Diagnoses     Essential hypertension, benign    -  1    Chronic gout involving toe without tophus, unspecified cause, unspecified laterality        Gastroesophageal reflux disease without esophagitis        Erectile dysfunction, unspecified erectile dysfunction type        Need for hepatitis C screening test        CARDIOVASCULAR SCREENING; LDL GOAL LESS THAN 130        Cough          Care Instructions    ASSESSMENT:   (I10) Essential hypertension, benign  (primary encounter diagnosis)  Comment: high today  Plan: lisinopril (PRINIVIL/ZESTRIL) 20 MG tablet,         Basic metabolic panel        Increase lisinopril to 20mg once daily.   Monitor BP periodically.  This can be done at home, in clinic, at our pharmacy, at a store or by neighbor or relative with a blood pressure cuff.  You should be sitting and relaxed for several minutes when taking blood pressure.   Goal BP (most readings) should be less than 140/90    Normal blood pressure is 120/80.    If your blood pressure is consistently at or above the goal, some changes should be made with lifestyle or medication to keep blood pressure under good control.    High blood pressure over time can lead to eye and kidney damage and increase risk for heart attacks and strokes.   Let us know if readings are often high, so we can help get your blood pressure under good control.     (M1A.9XX0) Chronic gout involving toe without tophus, unspecified cause, unspecified laterality  Comment: has been doing well  Plan: allopurinol (ZYLOPRIM) 300 MG tablet, ALT, CBC         with platelets        REfills.     (K21.9) Gastroesophageal reflux disease without esophagitis  Comment:   Plan: omeprazole  "(PRILOSEC) 40 MG capsule        REfills.     (N52.9) Erectile dysfunction, unspecified erectile dysfunction type  Comment:   Plan: sildenafil (REVATIO) 20 MG tablet        Sidenafil (Viagra) comes in 20mg strength pills. Take as many pills as needed up to maximum of 5 pills at a time prior to intercourse.     (Z11.59) Need for hepatitis C screening test  Comment:   Plan: Hepatitis C antibody          (Z13.6) CARDIOVASCULAR SCREENING; LDL GOAL LESS THAN 130  Comment:   Plan: Lipid panel reflex to direct LDL Fasting          (R05) Cough  Comment:   Plan: albuterol (PROAIR HFA/PROVENTIL HFA/VENTOLIN         HFA) 108 (90 BASE) MCG/ACT Inhaler        Albuteral inhaler can be used taking 2 inhalations every 4 hours as needed for coughing, wheezing or shortness of breath.           Follow-ups after your visit        Who to contact     If you have questions or need follow up information about today's clinic visit or your schedule please contact [facility] directly at [phone].  Normal or non-critical lab and imaging results will be communicated to you by Tillerhart, letter or phone within 4 business days after the clinic has received the results. If you do not hear from us within 7 days, please contact the clinic through CloudAccesst or phone. If you have a critical or abnormal lab result, we will notify you by phone as soon as possible.  Submit refill requests through PaperShare or call your pharmacy and they will forward the refill request to us. Please allow 3 business days for your refill to be completed.          Additional Information About Your Visit        [employer] Information     PaperShare lets you send messages to your doctor, view your test results, renew your prescriptions, schedule appointments and more. To sign up, go to [email or web address] . Click on \"Log in\" on the left side of the screen, which will take you to the Welcome page. Then click on \"Sign up Now\" on the right side of the page. " "    You will be asked to enter the access code listed below, as well as some personal information. Please follow the directions to create your username and password.     Your access code is: [email or web address]  Expires: 2018  9:52 AM     Your access code will  in 90 days. If you need help or a new code, please call your [place] clinic or [phone].        Care EveryWhere ID     This is your Care EveryWhere ID. This could be used by other organizations to access your [place] medical records  [id number]        Your Vitals Were     Pulse Temperature Respirations Height BMI (Body Mass Index)       84 97.6  F (36.4  C) (Tympanic) 20 5' 11\" (1.803 m) 37.41 kg/m2        Blood Pressure from Last 3 Encounters:   18 (!) 150/94   17 (!) 150/100   10/10/16 130/86    Weight from Last 3 Encounters:   18 268 lb 3.2 oz (121.7 kg)   17 266 lb (120.7 kg)   10/10/16 253 lb (114.8 kg)              We Performed the Following     ALT     Basic metabolic panel     CBC with platelets     Hepatitis C antibody     Lipid panel reflex to direct LDL Non-fasting          Today's Medication Changes          These changes are accurate as of: 18  9:52 AM.  If you have any questions, ask your nurse or doctor.               Start taking these medicines.        Dose/Directions    sildenafil 20 MG tablet   Commonly known as:  REVATIO   Used for:  Erectile dysfunction, unspecified erectile dysfunction type   Replaces:  VIAGRA 100 MG tablet   Started by:  Andrea Sandhu MD        Take as many pills as needed up to maximum of 5 pills at a time prior to intercourse.   Quantity:  50 tablet   Refills:  3         These medicines have changed or have updated prescriptions.        Dose/Directions    allopurinol 300 MG tablet   Commonly known as:  ZYLOPRIM   This may have changed:  See the new instructions.   Used for:  Chronic gout involving toe without tophus, unspecified cause, unspecified laterality   Changed by:  " Andrea Sandhu MD        Dose:  1 tablet   Take 1 tablet (300 mg) by mouth daily   Quantity:  90 tablet   Refills:  3       lisinopril 20 MG tablet   Commonly known as:  PRINIVIL/ZESTRIL   This may have changed:  See the new instructions.   Used for:  Essential hypertension, benign   Changed by:  Andrea Sandhu MD        Dose:  20 mg   Take 1 tablet (20 mg) by mouth daily   Quantity:  90 tablet   Refills:  3         Stop taking these medicines if you haven't already. Please contact your care team if you have questions.     amoxicillin-clavulanate 875-125 MG per tablet   Commonly known as:  AUGMENTIN   Stopped by:  Andrea Sandhu MD           VIAGRA 100 MG tablet   Generic drug:  sildenafil   Replaced by:  sildenafil 20 MG tablet   Stopped by:  Andrea Sandhu MD                Where to get your medicines      These medications were sent to [facility] [address] [place] [address] [address]  [address] [facility] [address]     Phone:  [phone]     albuterol 108 (90 BASE) MCG/ACT Inhaler    allopurinol 300 MG tablet    lisinopril 20 MG tablet    omeprazole 40 MG capsule    sildenafil 20 MG tablet                Primary Care Provider Office Phone # Fax #    Andrea Sandhu [phone] [address] [facility] [address]        Equal Access to Services     CHAPARRITA CORDON AH: Hadii red alfaro hadasho Soomaali, waaxda luqadaha, qaybta kaalmada adeegyada, waxay lisa haykaren noel. So [facility] [phone].    ATENCIÓN: Si habla español, tiene a daniel disposición servicios gratuitos de asistencia lingüística. Llame al [phone].    We comply with applicable federal civil rights laws and [place] laws. We do not discriminate on the basis of race, color, national origin, age, disability, sex, sexual orientation, or gender identity.            Thank you!     Thank you for choosing [facility]  for your care. Our goal is always to provide you with  excellent care. Hearing back from our patients is one way we can continue to improve our services. Please take a few minutes to complete the written survey that you may receive in the mail after your visit with us. Thank you!             Your Updated Medication List - Protect others around you: Learn how to safely use, store and throw away your medicines at www.disposemymeds.org.          This list is accurate as of: 1/4/18  9:52 AM.  Always use your most recent med list.                   Brand Name Dispense Instructions for use Diagnosis    albuterol 108 (90 BASE) MCG/ACT Inhaler    PROAIR HFA/PROVENTIL HFA/VENTOLIN HFA    1 Inhaler    Inhale 2 puffs into the lungs every 6 hours as needed for shortness of breath / dyspnea or wheezing    Cough       allopurinol 300 MG tablet    ZYLOPRIM    90 tablet    Take 1 tablet (300 mg) by mouth daily    Chronic gout involving toe without tophus, unspecified cause, unspecified laterality       ferrous gluconate 325 (36 FE) MG Tabs      Take 1 tablet by mouth daily        lisinopril 20 MG tablet    PRINIVIL/ZESTRIL    90 tablet    Take 1 tablet (20 mg) by mouth daily    Essential hypertension, benign       omeprazole 40 MG capsule    priLOSEC    90 capsule    Take 1 capsule (40 mg) by mouth daily Take 30-60 minutes before a meal.    Gastroesophageal reflux disease without esophagitis       sildenafil 20 MG tablet    REVATIO    50 tablet    Take as many pills as needed up to maximum of 5 pills at a time prior to intercourse.    Erectile dysfunction, unspecified erectile dysfunction type

## 2018-01-04 NOTE — NURSING NOTE
"Chief Complaint   Patient presents with     Hypertension       Initial BP (!) 150/94  Pulse 84  Temp 97.6  F (36.4  C) (Tympanic)  Resp 20  Ht 5' 11\" (1.803 m)  Wt 268 lb 3.2 oz (121.7 kg)  BMI 37.41 kg/m2 Estimated body mass index is 37.41 kg/(m^2) as calculated from the following:    Height as of this encounter: 5' 11\" (1.803 m).    Weight as of this encounter: 268 lb 3.2 oz (121.7 kg).  Medication Reconciliation: complete    "

## 2018-01-05 LAB — HCV AB SERPL QL IA: NONREACTIVE

## 2018-01-07 NOTE — PROGRESS NOTES
"Please call.  Hepatitis C test is negative.  The blood chemistries (Basic metabolic panel) are all normal including electrolytes (salt balances in the blood), blood glucose and kidney tests.   His ALT liver test is abnormal.   HDL (\"good cholesterol\") is low.  , Triglycerides, a type of fat found in the bloodstream is high.  His LDL and cholesterol are OK.     I do not know why liver test is abnormal.   Lipids are abnormal.  New cholesterol guidelines (from AHA/ACC pooled risk calculation) estimates 10 year risk of having a heart attack at about 12.7%.  Any risk over 7.5% is considered significant and statin type medication is recommended to prevent heart attacks.      PLAN: Recheck hepatic profile in 1 month  Weight loss, regular exercise with goal of 30 minutes most days of the week and eating a prudent diet (lots of fruits and vegetables, limiting unhealthy fats and excessive calories) can help improve cholesterol.   We could start medication to lower cholesterol and chances of heart attacks if he would be willing to take medication like atorvastatin 20mg daily.  We can do prescription for #30 with 11 refills.   Please arrange for prescriptions and orders.      The 10-year ASCVD risk score (Buffalolanre TIM Jr, et al., 2013) is: 12.7%    Values used to calculate the score:      Age: 57 years      Sex: Male      Is Non- : No      Diabetic: No      Tobacco smoker: No      Systolic Blood Pressure: 150 mmHg      Is BP treated: Yes      HDL Cholesterol: 33 mg/dL      Total Cholesterol: 193 mg/dL"

## 2018-01-15 ENCOUNTER — OFFICE VISIT (OUTPATIENT)
Dept: FAMILY MEDICINE | Facility: CLINIC | Age: 58
End: 2018-01-15
Payer: COMMERCIAL

## 2018-01-15 VITALS
DIASTOLIC BLOOD PRESSURE: 94 MMHG | SYSTOLIC BLOOD PRESSURE: 132 MMHG | BODY MASS INDEX: 36.54 KG/M2 | WEIGHT: 262 LBS | RESPIRATION RATE: 20 BRPM | OXYGEN SATURATION: 96 % | HEART RATE: 76 BPM | TEMPERATURE: 97.3 F

## 2018-01-15 DIAGNOSIS — R05.9 COUGH: Primary | ICD-10-CM

## 2018-01-15 DIAGNOSIS — I10 HYPERTENSION GOAL BP (BLOOD PRESSURE) < 140/90: ICD-10-CM

## 2018-01-15 DIAGNOSIS — R79.89 ELEVATED LFTS: ICD-10-CM

## 2018-01-15 DIAGNOSIS — Z13.6 CARDIOVASCULAR SCREENING; LDL GOAL LESS THAN 130: ICD-10-CM

## 2018-01-15 PROCEDURE — 99214 OFFICE O/P EST MOD 30 MIN: CPT | Performed by: FAMILY MEDICINE

## 2018-01-15 RX ORDER — PREDNISONE 20 MG/1
TABLET ORAL
Qty: 16 TABLET | Refills: 0 | Status: SHIPPED | OUTPATIENT
Start: 2018-01-15 | End: 2018-07-10

## 2018-01-15 ASSESSMENT — PAIN SCALES - GENERAL: PAINLEVEL: NO PAIN (0)

## 2018-01-15 NOTE — NURSING NOTE
"Chief Complaint   Patient presents with     Results     discuss LFT elevated.     Lipids     Discuss starting meds.     Medication Reconciliation     Stopped taking iron on his own after lever tests came back elevated     URI     Cold and cough x 1 month.       Initial /90 (BP Location: Right arm, Patient Position: Chair, Cuff Size: Adult Large)  Pulse 76  Temp 97.3  F (36.3  C) (Tympanic)  Resp 20  Wt 262 lb (118.8 kg)  SpO2 96%  BMI 36.54 kg/m2 Estimated body mass index is 36.54 kg/(m^2) as calculated from the following:    Height as of 1/4/18: 5' 11\" (1.803 m).    Weight as of this encounter: 262 lb (118.8 kg).  Medication Reconciliation: complete    Health Maintenance that is potentially due pending provider review:  Due for DTAP    Ill today.    Is there anyone who you would like to be able to receive your results? No  If yes have patient fill out CECILIO  Katiuska Shaw CMA    "

## 2018-01-15 NOTE — PATIENT INSTRUCTIONS
ASSESSMENT:   (R05) Cough  (primary encounter diagnosis)  Comment: From viral upper respiratory infection and history of asthma  Plan: predniSONE (DELTASONE) 20 MG tablet          Take prednisone 20mg 2 pills daily for 6 days, then 1 pill daily for another 4 days.   Albuteral inhaler can be used taking 2 inhalations every 4 hours as needed for coughing, wheezing or shortness of breath.     If you have more shortness of breath, fever, getting worse or persistent pain in face or forehead, contact me.  I don't think you need another antibiotic at this time.   You should be improving in the next couple days on the prednisone.     (I10) Hypertension goal BP (blood pressure) < 140/90  Comment: a little high-improved  Plan:   Recheck blood pressure in a month.    Monitor BP periodically.  This can be done at home, in clinic, at our pharmacy, at a store or by neighbor or relative with a blood pressure cuff.  You should be sitting and relaxed for several minutes when taking blood pressure.   Goal BP (most readings) should be less than 140/90    Normal blood pressure is 120/80.    If your blood pressure is consistently at or above the goal, some changes should be made with lifestyle or medication to keep blood pressure under good control.    High blood pressure over time can lead to eye and kidney damage and increase risk for heart attacks and strokes.   Let us know if readings are often high, so we can help get your blood pressure under good control.     If blood pressure remains high, we can add another medication.     (R79.89) Elevated LFTs  Comment: abnormal liver test.   Plan: REcheck hepatic profile in a month.      (Z13.6) CARDIOVASCULAR SCREENING; LDL GOAL LESS THAN 130  Comment: at some cardiovascular risk for heart attacks.   Plan:   New cholesterol guidelines (from AHA/ACC pooled risk calculation) estimates 10 year risk of having a heart attack at about 10%.  Any risk over 7.5% is considered significant and statin  type medication is recommended to prevent heart attacks.      See what you can do with Weight loss, regular exercise with goal of 30 minutes most days of the week and eating a prudent diet (lots of fruits and vegetables, limiting unhealthy fats and excessive calories).    We can do referral to dietician if desired.   REcheck in 6 months.

## 2018-01-15 NOTE — MR AVS SNAPSHOT
After Visit Summary   1/15/2018    Shon Sargent    MRN: [id number]           Patient Information     Date Of Birth          1960        Visit Information        Provider Department      1/15/2018 8:20 AM Andrea Sandhu MD [facility]        Today's Diagnoses     Cough    -  1    Hypertension goal BP (blood pressure) < 140/90        Elevated LFTs        CARDIOVASCULAR SCREENING; LDL GOAL LESS THAN 130          Care Instructions    ASSESSMENT:   (R05) Cough  (primary encounter diagnosis)  Comment: From viral upper respiratory infection and history of asthma  Plan: predniSONE (DELTASONE) 20 MG tablet          Take prednisone 20mg 2 pills daily for 6 days, then 1 pill daily for another 4 days.   Albuteral inhaler can be used taking 2 inhalations every 4 hours as needed for coughing, wheezing or shortness of breath.     If you have more shortness of breath, fever, getting worse or persistent pain in face or forehead, contact me.  I don't think you need another antibiotic at this time.   You should be improving in the next couple days on the prednisone.     (I10) Hypertension goal BP (blood pressure) < 140/90  Comment: a little high-improved  Plan:   Recheck blood pressure in a month.    Monitor BP periodically.  This can be done at home, in clinic, at our pharmacy, at a store or by neighbor or relative with a blood pressure cuff.  You should be sitting and relaxed for several minutes when taking blood pressure.   Goal BP (most readings) should be less than 140/90    Normal blood pressure is 120/80.    If your blood pressure is consistently at or above the goal, some changes should be made with lifestyle or medication to keep blood pressure under good control.    High blood pressure over time can lead to eye and kidney damage and increase risk for heart attacks and strokes.   Let us know if readings are often high, so we can help get your blood pressure under good control.  "    If blood pressure remains high, we can add another medication.     (R79.89) Elevated LFTs  Comment: abnormal liver test.   Plan: REcheck hepatic profile in a month.      (Z13.6) CARDIOVASCULAR SCREENING; LDL GOAL LESS THAN 130  Comment: at some cardiovascular risk for heart attacks.   Plan:   New cholesterol guidelines (from AHA/ACC pooled risk calculation) estimates 10 year risk of having a heart attack at about 10%.  Any risk over 7.5% is considered significant and statin type medication is recommended to prevent heart attacks.      See what you can do with Weight loss, regular exercise with goal of 30 minutes most days of the week and eating a prudent diet (lots of fruits and vegetables, limiting unhealthy fats and excessive calories).    We can do referral to dietician if desired.   REcheck in 6 months.           Follow-ups after your visit        Who to contact     If you have questions or need follow up information about today's clinic visit or your schedule please contact [facility] directly at [phone].  Normal or non-critical lab and imaging results will be communicated to you by MerryMarryhart, letter or phone within 4 business days after the clinic has received the results. If you do not hear from us within 7 days, please contact the clinic through Arcivrt or phone. If you have a critical or abnormal lab result, we will notify you by phone as soon as possible.  Submit refill requests through [employer] or call your pharmacy and they will forward the refill request to us. Please allow 3 business days for your refill to be completed.          Additional Information About Your Visit        [employer] Information     [employer] lets you send messages to your doctor, view your test results, renew your prescriptions, schedule appointments and more. To sign up, go to [email or web address] . Click on \"Log in\" on the left side of the screen, which will take you to the Welcome page. Then click on " "\"Sign up Now\" on the right side of the page.     You will be asked to enter the access code listed below, as well as some personal information. Please follow the directions to create your username and password.     Your access code is: [email or web address]  Expires: 2018  9:52 AM     Your access code will  in 90 days. If you need help or a new code, please call your [facility] or [phone].        Care EveryWhere ID     This is your Care EveryWhere ID. This could be used by other organizations to access your [place] medical records  [id number]        Your Vitals Were     Pulse Temperature Respirations Pulse Oximetry BMI (Body Mass Index)       76 97.3  F (36.3  C) (Tympanic) 20 96% 36.54 kg/m2        Blood Pressure from Last 3 Encounters:   01/15/18 (!) 132/94   18 (!) 150/94   17 (!) 150/100    Weight from Last 3 Encounters:   01/15/18 262 lb (118.8 kg)   18 268 lb 3.2 oz (121.7 kg)   17 266 lb (120.7 kg)              Today, you had the following     No orders found for display         Today's Medication Changes          These changes are accurate as of: 1/15/18  8:57 AM.  If you have any questions, ask your nurse or doctor.               Start taking these medicines.        Dose/Directions    predniSONE 20 MG tablet   Commonly known as:  DELTASONE   Used for:  Cough   Started by:  Andrea Sandhu MD        Take prednisone 20mg 2 pills daily for 6 days, then 1 pill daily for another 4 days.   Quantity:  16 tablet   Refills:  0         Stop taking these medicines if you haven't already. Please contact your care team if you have questions.     ferrous gluconate 325 (36 FE) MG Tabs   Stopped by:  Andrea Sandhu MD                Where to get your medicines      These medications were sent to [facility] [address] [place] [address]  [address] [facility] [address]     Phone:  [phone]     predniSONE 20 MG tablet                Primary Care Provider " Office Phone # Fax #    Andrea Sandhu [phone] [address] [place] [address]        Equal Access to Services     CHAPARRITA CORDON : Hadii aad ku hadmargaritamarcie Naticharley, wasukhda jrqmerlin, qayesseniata kavinceda krista, gladys gould navarrolubna hancock lashayyadelita noel. So [facility] [phone].    ATENCIÓN: Si habla español, tiene a daniel disposición servicios gratuitos de asistencia lingüística. Llame al [phone].    We comply with applicable federal civil rights laws and [place] laws. We do not discriminate on the basis of race, color, national origin, age, disability, sex, sexual orientation, or gender identity.            Thank you!     Thank you for choosing [facility]  for your care. Our goal is always to provide you with excellent care. Hearing back from our patients is one way we can continue to improve our services. Please take a few minutes to complete the written survey that you may receive in the mail after your visit with us. Thank you!             Your Updated Medication List - Protect others around you: Learn how to safely use, store and throw away your medicines at www.disposemymeds.org.          This list is accurate as of: 1/15/18  8:57 AM.  Always use your most recent med list.                   Brand Name Dispense Instructions for use Diagnosis    albuterol 108 (90 BASE) MCG/ACT Inhaler    PROAIR HFA/PROVENTIL HFA/VENTOLIN HFA    1 Inhaler    Inhale 2 puffs into the lungs every 6 hours as needed for shortness of breath / dyspnea or wheezing    Cough       allopurinol 300 MG tablet    ZYLOPRIM    90 tablet    Take 1 tablet (300 mg) by mouth daily    Chronic gout involving toe without tophus, unspecified cause, unspecified laterality       lisinopril 20 MG tablet    PRINIVIL/ZESTRIL    90 tablet    Take 1 tablet (20 mg) by mouth daily    Essential hypertension, benign       omeprazole 40 MG capsule    priLOSEC    90 capsule    Take 1 capsule (40 mg) by mouth daily  Take 30-60 minutes before a meal.    Gastroesophageal reflux disease without esophagitis       predniSONE 20 MG tablet    DELTASONE    16 tablet    Take prednisone 20mg 2 pills daily for 6 days, then 1 pill daily for another 4 days.    Cough       sildenafil 20 MG tablet    REVATIO    50 tablet    Take as many pills as needed up to maximum of 5 pills at a time prior to intercourse.    Erectile dysfunction, unspecified erectile dysfunction type

## 2018-01-15 NOTE — PROGRESS NOTES
SUBJECTIVE:   Shon Sargent is a 57 year old male who presents to clinic today for the following health issues:  Chief Complaint   Patient presents with     Results     discuss LFT elevated.     Lipids     Discuss starting meds.     Medication Reconciliation     Stopped taking iron on his own after lever tests came back elevated     URI     Cold and cough x 1 month.        RESPIRATORY SYMPTOMS      Duration: over 1 month    Description  nasal congestion, cough, wheezing, chills, fatigue/malaise, hoarse voice, myalgias, stomach ache, diarrhea and last week.    Severity: moderate    Accompanying signs and symptoms: None    History (predisposing factors):  Girlfriend - pneumonia    Precipitating or alleviating factors: None    Therapies tried and outcome:  Augmentin- therapy completed  Started as a cough.  Seen in clinic for this illness on 12/26.  Treated for sinusitis.  Treated with Augmentin. Was better, then had stomach flu.      Currently has some wheezing later in the day.  He has rhinorrhea can be green or clear.  Some headache and feels weak and a little achy. Cough episodic-it has improved.  Course of symptoms over time: improved.    Sometimes shortness of breath.  He notes dyspnea on exertion.   He has had some asthma in the past.  He has an albuteral inhaler.  Using 1-2 times a day recently.  Seems to help.   Some pain right face near eye for 2-3 days.    No fever recently.   Never smoked.     Problem 2: hyperlipidemia.  Was advised medication for cholesterol lowering.     Problem 3: abnormal liver function tests.  Questions about recent labs.     Problem 4: hypertension.  lisinopril increased on 1/4.  He has not been checking blood pressure.      Seldom drinks alcohol.     Patient Active Problem List   Diagnosis     Hypertension goal BP (blood pressure) < 140/90     Tremor     Gout     ED (erectile dysfunction)     Psoriasis     Esophageal reflux (GERD)     CARDIOVASCULAR SCREENING; LDL GOAL LESS THAN  130     Iron deficiency anemia     OBJECTIVE:Blood pressure (!) 132/94, pulse 76, temperature 97.3  F (36.3  C), temperature source Tympanic, resp. rate 20, weight 262 lb (118.8 kg), SpO2 96 %. BMI=Body mass index is 36.54 kg/(m^2).  GENERAL APPEARANCE ADULT: Alert, no acute distress, obese  EYES: PERRL, EOM normal, conjunctiva and lids normal  HENT: Ears and TMs normal, oral mucosa and posterior oropharynx normal  NECK: No adenopathy,masses or thyromegaly  RESP: lungs clear to auscultation   CV: normal rate, regular rhythm, no murmur or gallop     ASSESSMENT:   (R05) Cough  (primary encounter diagnosis)  Comment: From viral upper respiratory infection and history of asthma  Plan: predniSONE (DELTASONE) 20 MG tablet          Take prednisone 20mg 2 pills daily for 6 days, then 1 pill daily for another 4 days.   Albuteral inhaler can be used taking 2 inhalations every 4 hours as needed for coughing, wheezing or shortness of breath.     If you have more shortness of breath, fever, getting worse or persistent pain in face or forehead, contact me.  I don't think you need another antibiotic at this time.   You should be improving in the next couple days on the prednisone.     (I10) Hypertension goal BP (blood pressure) < 140/90  Comment: a little high-improved  Plan:   Recheck blood pressure in a month.    Monitor BP periodically.  This can be done at home, in clinic, at our pharmacy, at a store or by neighbor or relative with a blood pressure cuff.  You should be sitting and relaxed for several minutes when taking blood pressure.   Goal BP (most readings) should be less than 140/90    Normal blood pressure is 120/80.    If your blood pressure is consistently at or above the goal, some changes should be made with lifestyle or medication to keep blood pressure under good control.    High blood pressure over time can lead to eye and kidney damage and increase risk for heart attacks and strokes.   Let us know if readings are  often high, so we can help get your blood pressure under good control.     If blood pressure remains high, we can add another medication.     (R79.89) Elevated LFTs  Comment: abnormal liver test.   Plan: REcheck hepatic profile in a month.      (Z13.6) CARDIOVASCULAR SCREENING; LDL GOAL LESS THAN 130  Comment: at some cardiovascular risk for heart attacks.   Plan:   New cholesterol guidelines (from AHA/ACC pooled risk calculation) estimates 10 year risk of having a heart attack at about 10%.  Any risk over 7.5% is considered significant and statin type medication is recommended to prevent heart attacks.      See what you can do with Weight loss, regular exercise with goal of 30 minutes most days of the week and eating a prudent diet (lots of fruits and vegetables, limiting unhealthy fats and excessive calories).    We can do referral to dietician if desired.   REcheck in 6 months.     The 10-year ASCVD risk score (Sandra TIM Jr, et al., 2013) is: 10.2%    Values used to calculate the score:      Age: 57 years      Sex: Male      Is Non- : No      Diabetic: No      Tobacco smoker: No      Systolic Blood Pressure: 132 mmHg      Is BP treated: Yes      HDL Cholesterol: 33 mg/dL      Total Cholesterol: 193 mg/dL

## 2018-02-13 DIAGNOSIS — R79.89 ELEVATED LFTS: ICD-10-CM

## 2018-02-13 LAB
ALBUMIN SERPL-MCNC: 3.7 G/DL (ref 3.4–5)
ALP SERPL-CCNC: 120 U/L (ref 40–150)
ALT SERPL W P-5'-P-CCNC: 64 U/L (ref 0–70)
AST SERPL W P-5'-P-CCNC: 37 U/L (ref 0–45)
BILIRUB DIRECT SERPL-MCNC: 0.2 MG/DL (ref 0–0.2)
BILIRUB SERPL-MCNC: 0.8 MG/DL (ref 0.2–1.3)
PROT SERPL-MCNC: 7.1 G/DL (ref 6.8–8.8)

## 2018-02-13 PROCEDURE — 80076 HEPATIC FUNCTION PANEL: CPT | Performed by: FAMILY MEDICINE

## 2018-02-13 PROCEDURE — 36415 COLL VENOUS BLD VENIPUNCTURE: CPT | Performed by: FAMILY MEDICINE

## 2018-03-19 ENCOUNTER — ALLIED HEALTH/NURSE VISIT (OUTPATIENT)
Dept: FAMILY MEDICINE | Facility: CLINIC | Age: 58
End: 2018-03-19
Payer: COMMERCIAL

## 2018-03-19 ENCOUNTER — TELEPHONE (OUTPATIENT)
Dept: FAMILY MEDICINE | Facility: CLINIC | Age: 58
End: 2018-03-19

## 2018-03-19 VITALS — DIASTOLIC BLOOD PRESSURE: 80 MMHG | SYSTOLIC BLOOD PRESSURE: 136 MMHG

## 2018-03-19 DIAGNOSIS — I10 HYPERTENSION GOAL BP (BLOOD PRESSURE) < 140/90: Primary | ICD-10-CM

## 2018-03-19 PROCEDURE — 99207 ZZC NO CHARGE NURSE ONLY: CPT | Performed by: FAMILY MEDICINE

## 2018-03-19 NOTE — TELEPHONE ENCOUNTER
Shon has 30 # since joining [facility] about 8 weeks ago.  He will bring in BP readings from there   Katiuska Kwong RN

## 2018-07-10 ENCOUNTER — OFFICE VISIT (OUTPATIENT)
Dept: FAMILY MEDICINE | Facility: CLINIC | Age: 58
End: 2018-07-10
Payer: COMMERCIAL

## 2018-07-10 VITALS
DIASTOLIC BLOOD PRESSURE: 86 MMHG | SYSTOLIC BLOOD PRESSURE: 130 MMHG | HEIGHT: 71 IN | TEMPERATURE: 97.4 F | WEIGHT: 216 LBS | BODY MASS INDEX: 30.24 KG/M2 | HEART RATE: 64 BPM

## 2018-07-10 DIAGNOSIS — M54.6 ACUTE LEFT-SIDED THORACIC BACK PAIN: ICD-10-CM

## 2018-07-10 DIAGNOSIS — I10 HYPERTENSION GOAL BP (BLOOD PRESSURE) < 140/90: ICD-10-CM

## 2018-07-10 DIAGNOSIS — M1A.9XX0 CHRONIC GOUT INVOLVING TOE WITHOUT TOPHUS, UNSPECIFIED CAUSE, UNSPECIFIED LATERALITY: ICD-10-CM

## 2018-07-10 DIAGNOSIS — L40.9 PSORIASIS: ICD-10-CM

## 2018-07-10 DIAGNOSIS — K21.9 GASTROESOPHAGEAL REFLUX DISEASE WITHOUT ESOPHAGITIS: Primary | ICD-10-CM

## 2018-07-10 PROCEDURE — 99214 OFFICE O/P EST MOD 30 MIN: CPT | Performed by: FAMILY MEDICINE

## 2018-07-10 NOTE — MR AVS SNAPSHOT
After Visit Summary   7/10/2018    Shon Sargent    MRN: [id number]           Patient Information     Date Of Birth          1960        Visit Information        Provider Department      7/10/2018 8:00 AM Andrea Sandhu MD [facility]        Today's Diagnoses     Gastroesophageal reflux disease without esophagitis    -  1    Hypertension goal BP (blood pressure) < 140/90        Chronic gout involving toe without tophus, unspecified cause, unspecified laterality        Acute left-sided thoracic back pain        Psoriasis          Care Instructions    ASSESSMENT:   (K21.9) Gastroesophageal reflux disease without esophagitis  (primary encounter diagnosis)  Comment: heartburn has been doing very well.  May not need it with the weight loss.   Plan: omeprazole (PRILOSEC) 20 MG CR capsule        Change the omeprazole to 20mg daily for 2 weeks, then every other day for 2 weeks, then try stopping.  If your heartburn comes back, use the lowest dose that kept symptoms under control.   If you needs some medication on occasion, you can use the omeprazole or ranitidine or even antacid medications like Tums or Maalox.     (I10) Hypertension goal BP (blood pressure) < 140/90  Comment: doing well  Plan: No change in current treatment plan.   Monitor BP periodically.  This can be done at home, in clinic, at our pharmacy, at a store or by neighbor or relative with a blood pressure cuff.  You should be sitting and relaxed for several minutes when taking blood pressure.   Goal BP (most readings) should be less than 140/90    Normal blood pressure is 120/80.    If your blood pressure is consistently at or above the goal, some changes should be made with lifestyle or medication to keep blood pressure under good control.    High blood pressure over time can lead to eye and kidney damage and increase risk for heart attacks and strokes.   Let us know if readings are often high, so we can help get  "your blood pressure under good control.     (M1A.9XX0) Chronic gout involving toe without tophus, unspecified cause, unspecified laterality  Comment: doing well on current allopurinol dose  Plan: No change in current treatment plan.     (M54.6) Acute left-sided thoracic back pain  Comment: I think this is muscular-ligamentous and not involving inside organs.   Plan: This should improve in the next few weeks.   If persists in a month, let me know and we can do CT scan if it seems connected to stomach symptoms.    (L40.9) Psoriasis  Comment:   Plan: I can refill if I know the type of cream.  Let my staff know.           Follow-ups after your visit        Who to contact     If you have questions or need follow up information about today's clinic visit or your schedule please contact [facility] directly at [phone].  Normal or non-critical lab and imaging results will be communicated to you by MyChart, letter or phone within 4 business days after the clinic has received the results. If you do not hear from us within 7 days, please contact the clinic through MyChart or phone. If you have a critical or abnormal lab result, we will notify you by phone as soon as possible.  Submit refill requests through [employer] or call your pharmacy and they will forward the refill request to us. Please allow 3 business days for your refill to be completed.          Additional Information About Your Visit        Care EveryWhere ID     This is your Care EveryWhere ID. This could be used by other organizations to access your [facility] medical records  [id number]        Your Vitals Were     Pulse Temperature Height BMI (Body Mass Index)          64 97.4  F (36.3  C) (Tympanic) 5' 11\" (1.803 m) 30.13 kg/m2         Blood Pressure from Last 3 Encounters:   07/10/18 130/86   03/19/18 136/80   01/15/18 (!) 132/94    Weight from Last 3 Encounters:   07/10/18 216 lb (98 kg)   01/15/18 262 lb (118.8 kg)   01/04/18 268 lb 3.2 " oz (121.7 kg)              Today, you had the following     No orders found for display         Today's Medication Changes          These changes are accurate as of 7/10/18  8:52 AM.  If you have any questions, ask your nurse or doctor.               These medicines have changed or have updated prescriptions.        Dose/Directions    omeprazole 20 MG CR capsule   Commonly known as:  priLOSEC   This may have changed:    - medication strength  - how much to take  - how to take this  - when to take this  - additional instructions   Used for:  Gastroesophageal reflux disease without esophagitis   Changed by:  Andrea Sandhu MD        One pill daily for 2 weeks, then every other day for 2 weeks.   Quantity:  21 capsule   Refills:  0         Stop taking these medicines if you haven't already. Please contact your care team if you have questions.     predniSONE 20 MG tablet   Commonly known as:  DELTASONE   Stopped by:  Andrea Sandhu MD                Where to get your medicines      These medications were sent to [facility] [address] [place] [address]  [address] [address]     Phone:  [phone]     omeprazole 20 MG CR capsule                Primary Care Provider Office Phone # Fax #    Andrea Sandhu [phone] [address] [facility] [address]        Equal Access to Services     CHAPARRITA CORDON AH: Kyaw lozanoo Socharley, wasukhda luqadaha, qaybta kaalmada adeegyada, gladys noel. So [facility] [phone].    ATENCIÓN: Si habla español, tiene a daniel disposición servicios gratuitos de asistencia lingüística. Llame al [phone].    We comply with applicable federal civil rights laws and [place] laws. We do not discriminate on the basis of race, color, national origin, age, disability, sex, sexual orientation, or gender identity.            Thank you!     Thank you for choosing [facility]  for your  care. Our goal is always to provide you with excellent care. Hearing back from our patients is one way we can continue to improve our services. Please take a few minutes to complete the written survey that you may receive in the mail after your visit with us. Thank you!             Your Updated Medication List - Protect others around you: Learn how to safely use, store and throw away your medicines at www.disposemymeds.org.          This list is accurate as of 7/10/18  8:52 AM.  Always use your most recent med list.                   Brand Name Dispense Instructions for use Diagnosis    albuterol 108 (90 Base) MCG/ACT Inhaler    PROAIR HFA/PROVENTIL HFA/VENTOLIN HFA    1 Inhaler    Inhale 2 puffs into the lungs every 6 hours as needed for shortness of breath / dyspnea or wheezing    Cough       allopurinol 300 MG tablet    ZYLOPRIM    90 tablet    Take 1 tablet (300 mg) by mouth daily    Chronic gout involving toe without tophus, unspecified cause, unspecified laterality       lisinopril 20 MG tablet    PRINIVIL/ZESTRIL    90 tablet    Take 1 tablet (20 mg) by mouth daily    Essential hypertension, benign       omeprazole 20 MG CR capsule    priLOSEC    21 capsule    One pill daily for 2 weeks, then every other day for 2 weeks.    Gastroesophageal reflux disease without esophagitis       sildenafil 20 MG tablet    REVATIO    50 tablet    Take as many pills as needed up to maximum of 5 pills at a time prior to intercourse.    Erectile dysfunction, unspecified erectile dysfunction type

## 2018-07-10 NOTE — PATIENT INSTRUCTIONS
ASSESSMENT:   (K21.9) Gastroesophageal reflux disease without esophagitis  (primary encounter diagnosis)  Comment: heartburn has been doing very well.  May not need it with the weight loss.   Plan: omeprazole (PRILOSEC) 20 MG CR capsule        Change the omeprazole to 20mg daily for 2 weeks, then every other day for 2 weeks, then try stopping.  If your heartburn comes back, use the lowest dose that kept symptoms under control.   If you needs some medication on occasion, you can use the omeprazole or ranitidine or even antacid medications like Tums or Maalox.     (I10) Hypertension goal BP (blood pressure) < 140/90  Comment: doing well  Plan: No change in current treatment plan.   Monitor BP periodically.  This can be done at home, in clinic, at our pharmacy, at a store or by neighbor or relative with a blood pressure cuff.  You should be sitting and relaxed for several minutes when taking blood pressure.   Goal BP (most readings) should be less than 140/90    Normal blood pressure is 120/80.    If your blood pressure is consistently at or above the goal, some changes should be made with lifestyle or medication to keep blood pressure under good control.    High blood pressure over time can lead to eye and kidney damage and increase risk for heart attacks and strokes.   Let us know if readings are often high, so we can help get your blood pressure under good control.     (M1A.9XX0) Chronic gout involving toe without tophus, unspecified cause, unspecified laterality  Comment: doing well on current allopurinol dose  Plan: No change in current treatment plan.     (M54.6) Acute left-sided thoracic back pain  Comment: I think this is muscular-ligamentous and not involving inside organs.   Plan: This should improve in the next few weeks.   If persists in a month, let me know and we can do CT scan if it seems connected to stomach symptoms.    (L40.9) Psoriasis  Comment:   Plan: I can refill if I know the type of cream.  Let  my staff know.

## 2018-08-16 ENCOUNTER — TELEPHONE (OUTPATIENT)
Dept: FAMILY MEDICINE | Facility: CLINIC | Age: 58
End: 2018-08-16

## 2018-08-16 DIAGNOSIS — K21.9 GASTROESOPHAGEAL REFLUX DISEASE WITHOUT ESOPHAGITIS: ICD-10-CM

## 2018-08-16 NOTE — TELEPHONE ENCOUNTER
ASSESSMENT:   (K21.9) Gastroesophageal reflux disease without esophagitis  (primary encounter diagnosis)  Comment: heartburn has been doing very well.  May not need it with the weight loss.   Plan: omeprazole (PRILOSEC) 20 MG CR capsule        Change the omeprazole to 20mg daily for 2 weeks, then every other day for 2 weeks, then try stopping.  If your heartburn comes back, use the lowest dose that kept symptoms under control.   If you needs some medication on occasion, you can use the omeprazole or ranitidine or even antacid medications like Tums or Maalox.        Pt notified and script sent. Brittni Ro RN

## 2018-08-16 NOTE — TELEPHONE ENCOUNTER
Shon says he saw Dr Sandhu and he was going to wean him off the Omeprazole. He was to take it daily for 2 weeks and then go to every other day. He says he has been off it for a week now and the acid reflux is coming back. He is asking if he can get Rx to go back on it again.[phone]    [facility]

## 2018-08-23 ENCOUNTER — OFFICE VISIT (OUTPATIENT)
Dept: FAMILY MEDICINE | Facility: CLINIC | Age: 58
End: 2018-08-23
Payer: COMMERCIAL

## 2018-08-23 VITALS — TEMPERATURE: 97 F | RESPIRATION RATE: 18 BRPM | BODY MASS INDEX: 30.8 KG/M2 | HEIGHT: 71 IN | WEIGHT: 220 LBS

## 2018-08-23 DIAGNOSIS — H61.22 IMPACTED CERUMEN OF LEFT EAR: Primary | ICD-10-CM

## 2018-08-23 DIAGNOSIS — H65.192 OTHER ACUTE NONSUPPURATIVE OTITIS MEDIA OF LEFT EAR, RECURRENCE NOT SPECIFIED: ICD-10-CM

## 2018-08-23 PROCEDURE — 69209 REMOVE IMPACTED EAR WAX UNI: CPT | Mod: LT | Performed by: FAMILY MEDICINE

## 2018-08-23 PROCEDURE — 99213 OFFICE O/P EST LOW 20 MIN: CPT | Mod: 25 | Performed by: FAMILY MEDICINE

## 2018-08-23 RX ORDER — AMOXICILLIN 500 MG/1
500 CAPSULE ORAL 2 TIMES DAILY
Qty: 20 CAPSULE | Refills: 0 | Status: SHIPPED | OUTPATIENT
Start: 2018-08-23 | End: 2018-09-02

## 2018-08-23 NOTE — PATIENT INSTRUCTIONS
Earwax, Home Treatment    Everyone produces earwax from the lining of the ear canal. It serves to lubricate and protect the ear. The wax that forms in the canal naturally moves toward the outside of the ear and falls out. Sometimes the ear canal may contain too much wax. This can cause a blockage and loss of hearing. Directions are given below for home treatment.  Home care  If your doctor has advised you to remove a wax blockage yourself, follow these directions:    Unless a medicine was prescribed, you may use an over-the-counter product made for clearing earwax. These contain carbamide peroxide. Lie down with the blocked ear facing upward. Apply one dropper full of medicine and wait a few minutes. Grasp the outer ear and wiggle it to help the solution enter the canal.    Lean over a sink or basin with the blocked ear facing downward. Use a bulb syringe filled with warm (not hot or cold) water to rinse the ear several times. Use gentle pressure only.    If you are having trouble draining the water out of your ear canal, put a few drops of rubbing alcohol (isopropyl alcohol) into the ear canal. This will help remove the remaining water.    Repeat this procedure once a day for up to three days, or until your hearing is back to normal. Do not use this treatment for more than three days in a row.  Don ts    Don t use cold water to rinse the ear. This will make you dizzy.    Don t perform this procedure if you have an ear infection.    Don t perform this procedure if you have a ruptured eardrum.    Don t use cotton swabs, matches, hairpins, keys, or other objects to  clean  the ear canal. This can cause infection of the ear canal or rupture the eardrum. Because of their size and shape, cotton swabs can push earwax deeper into the ear canal instead of removing it.  Follow-up care  Follow up with your health care provider if you are not improving after three cleaning attempts, or as advised.  When to seek medical  advice  Call your health care provider right away if any of these occur:    Worsening ear pain    Fever of 101 F (38.3 C) or higher, or as directed by your health care provider    Hearing does not return to normal after three days of treatment    Fluid drainage or bleeding from the ear canal    Swelling, redness, or tenderness of the outer ear    Headache, neck pain, or stiff neck    2582-4439 [employer]. [address] [place] [address]. All rights reserved. This information is not intended as a substitute for professional medical care. Always follow your healthcare professional's instructions.        Otitis Media (Middle-Ear Infection) in Adults  Otitis media is another name for a middle-ear infection. It means an infection behind your eardrum. This kind of ear infection can happen after any condition that keeps fluid from draining from the middle ear. These conditions include allergies, a cold, a sore throat, or a respiratory infection.  Middle-ear infections are common in children, but they can also happen in adults. An ear infection in an adult may mean a more serious problem than in a child. So you may need additional tests. If you have an ear infection, you should see your health care provider for treatment.  What are the types of middle-ear infections?  Infections can affect the middle ear in several ways. They are:    Acute otitis media. This middle-ear infection occurs suddenly. It causes swelling and redness. Fluid and mucus become trapped inside the ear. You can have a fever and ear pain.    Otitis media with effusion. Fluid (effusion) and mucus build up in the middle ear after the infection goes away. You may feel like your middle ear is full. This can continue for months and may affect your hearing.    Chronic otitis media with effusion. Fluid (effusion) remains in the middle ear for a long time. Or it builds up again and again, even though there is no infection. This type of  middle-ear infection may be hard to treat. It may also affect your hearing.  Who is more likely to get a middle-ear infection?  You are more likely to get an ear infection if you:    Smoke or are around someone who smokes    Have seasonal or year-round allergy symptoms    Have a cold or other upper respiratory infection  What causes a middle-ear infection?  The middle ear connects to the throat by a canal called the eustachian tube. This tube helps even out the pressure between the outer ear and the inner ear. A cold or allergy can irritate the tube or cause the area around it to swell. This can keep fluid from draining from the middle ear. The fluid builds up behind the eardrum. Bacteria and viruses can grow in this fluid. The bacteria and viruses cause the middle-ear infection.  What are the symptoms of a middle-ear infection?  Common symptoms of a middle-ear infection in adults are:    Pain in 1 or both ears    Drainage from the ear    Muffled hearing    Sore throat   You may also have a fever. Rarely, your balance can be affected.  These symptoms may be the same as for other conditions. It s important to talk with your health care provider if you think you have a middle-ear infection. If you have a high fever, severe pain behind your ear, or paralysis in your face, see your provider as soon as you can.  How is a middle-ear infection diagnosed?  Your health care provider will take a medical history and do a physical exam. He or she will look at the outer ear and eardrum with an otoscope. The otoscope is a lighted tool that lets your provider see inside the ear. A pneumatic otoscope blows a puff of air into the ear to check how well your eardrum moves. If you eardrum doesn t move well, it may mean you have fluid behind it.  Your provider may also do a test called tympanometry. This test tells how well the middle ear is working. It can find any changes in pressure in the middle ear. Your provider may test your  hearing with a tuning fork.  How is a middle-ear infection treated?  A middle-ear infection may be treated with:    Antibiotics, taken by mouth or as ear drops    Medication for pain    Decongestants, antihistamines, or nasal steroids  Your health care provider may also have you try autoinsufflation. This helps adjust the air pressure in your ear. For this, you pinch your nose and gently exhale. This forces air back through the eustachian tube.  The exact treatment for your ear infection will depend on the type of infection you have. In general, if your symptoms don t get better in 48 to 72 hours, contact your health care provider.  Middle-ear infections can cause long-term problems if not treated. They can lead to:    Infection in other parts of the head    Permanent hearing loss    Paralysis of a nerve in your face  If you have a middle-ear infection that doesn t get better, you may need to see an ear, nose, and throat specialist (otolaryngologist). You may need a CT scan or MRI to check for head and neck cancer.  Ear tubes  Sometimes fluid stays in the middle ear even after you take antibiotics and the infection goes away. In this case, your health care provider may suggest that a small tube be placed in your ear. The tube is put at the opening of the eardrum. The tube keeps fluid from building up and relieves pressure in the middle ear. It can also help you hear better. This surgery is called myringotomy. It is not often done in adults.  The tubes usually fall out on their own after 6 months to a year.    9513-7498 [employer]. [address] [place] [address], [place] [address]. All rights reserved. This information is not intended as a substitute for professional medical care. Always follow your healthcare professional's instructions.

## 2018-08-23 NOTE — NURSING NOTE
"Chief Complaint   Patient presents with     Ear Problem       Initial Temp 97  F (36.1  C) (Tympanic)  Resp 18  Ht 5' 11\" (1.803 m)  Wt 220 lb (99.8 kg)  BMI 30.68 kg/m2 Estimated body mass index is 30.68 kg/(m^2) as calculated from the following:    Height as of this encounter: 5' 11\" (1.803 m).    Weight as of this encounter: 220 lb (99.8 kg).      Health Maintenance that is potentially due pending provider review:  NONE    n/a    Is there anyone who you would like to be able to receive your results? Not Applicable  If yes have patient fill out CECILIO    "

## 2018-08-23 NOTE — MR AVS SNAPSHOT
After Visit Summary   8/23/2018    Shon Sargent    MRN: [id number]           Patient Information     Date Of Birth          1960        Visit Information        Provider Department      8/23/2018 8:40 AM Oral Christensen MD [facility]        Today's Diagnoses     Other acute nonsuppurative otitis media of left ear, recurrence not specified    -  1    Impacted cerumen of left ear          Care Instructions      Earwax, Home Treatment    Everyone produces earwax from the lining of the ear canal. It serves to lubricate and protect the ear. The wax that forms in the canal naturally moves toward the outside of the ear and falls out. Sometimes the ear canal may contain too much wax. This can cause a blockage and loss of hearing. Directions are given below for home treatment.  Home care  If your doctor has advised you to remove a wax blockage yourself, follow these directions:    Unless a medicine was prescribed, you may use an over-the-counter product made for clearing earwax. These contain carbamide peroxide. Lie down with the blocked ear facing upward. Apply one dropper full of medicine and wait a few minutes. Grasp the outer ear and wiggle it to help the solution enter the canal.    Lean over a sink or basin with the blocked ear facing downward. Use a bulb syringe filled with warm (not hot or cold) water to rinse the ear several times. Use gentle pressure only.    If you are having trouble draining the water out of your ear canal, put a few drops of rubbing alcohol (isopropyl alcohol) into the ear canal. This will help remove the remaining water.    Repeat this procedure once a day for up to three days, or until your hearing is back to normal. Do not use this treatment for more than three days in a row.  Don ts    Don t use cold water to rinse the ear. This will make you dizzy.    Don t perform this procedure if you have an ear infection.    Don t perform this procedure if you  have a ruptured eardrum.    Don t use cotton swabs, matches, hairpins, keys, or other objects to  clean  the ear canal. This can cause infection of the ear canal or rupture the eardrum. Because of their size and shape, cotton swabs can push earwax deeper into the ear canal instead of removing it.  Follow-up care  Follow up with your health care provider if you are not improving after three cleaning attempts, or as advised.  When to seek medical advice  Call your health care provider right away if any of these occur:    Worsening ear pain    Fever of 101 F (38.3 C) or higher, or as directed by your health care provider    Hearing does not return to normal after three days of treatment    Fluid drainage or bleeding from the ear canal    Swelling, redness, or tenderness of the outer ear    Headache, neck pain, or stiff neck    0360-3801 [employer]. [address] [place] [address]. All rights reserved. This information is not intended as a substitute for professional medical care. Always follow your healthcare professional's instructions.        Otitis Media (Middle-Ear Infection) in Adults  Otitis media is another name for a middle-ear infection. It means an infection behind your eardrum. This kind of ear infection can happen after any condition that keeps fluid from draining from the middle ear. These conditions include allergies, a cold, a sore throat, or a respiratory infection.  Middle-ear infections are common in children, but they can also happen in adults. An ear infection in an adult may mean a more serious problem than in a child. So you may need additional tests. If you have an ear infection, you should see your health care provider for treatment.  What are the types of middle-ear infections?  Infections can affect the middle ear in several ways. They are:    Acute otitis media. This middle-ear infection occurs suddenly. It causes swelling and redness. Fluid and mucus become trapped  inside the ear. You can have a fever and ear pain.    Otitis media with effusion. Fluid (effusion) and mucus build up in the middle ear after the infection goes away. You may feel like your middle ear is full. This can continue for months and may affect your hearing.    Chronic otitis media with effusion. Fluid (effusion) remains in the middle ear for a long time. Or it builds up again and again, even though there is no infection. This type of middle-ear infection may be hard to treat. It may also affect your hearing.  Who is more likely to get a middle-ear infection?  You are more likely to get an ear infection if you:    Smoke or are around someone who smokes    Have seasonal or year-round allergy symptoms    Have a cold or other upper respiratory infection  What causes a middle-ear infection?  The middle ear connects to the throat by a canal called the eustachian tube. This tube helps even out the pressure between the outer ear and the inner ear. A cold or allergy can irritate the tube or cause the area around it to swell. This can keep fluid from draining from the middle ear. The fluid builds up behind the eardrum. Bacteria and viruses can grow in this fluid. The bacteria and viruses cause the middle-ear infection.  What are the symptoms of a middle-ear infection?  Common symptoms of a middle-ear infection in adults are:    Pain in 1 or both ears    Drainage from the ear    Muffled hearing    Sore throat   You may also have a fever. Rarely, your balance can be affected.  These symptoms may be the same as for other conditions. It s important to talk with your health care provider if you think you have a middle-ear infection. If you have a high fever, severe pain behind your ear, or paralysis in your face, see your provider as soon as you can.  How is a middle-ear infection diagnosed?  Your health care provider will take a medical history and do a physical exam. He or she will look at the outer ear and eardrum  with an otoscope. The otoscope is a lighted tool that lets your provider see inside the ear. A pneumatic otoscope blows a puff of air into the ear to check how well your eardrum moves. If you eardrum doesn t move well, it may mean you have fluid behind it.  Your provider may also do a test called tympanometry. This test tells how well the middle ear is working. It can find any changes in pressure in the middle ear. Your provider may test your hearing with a tuning fork.  How is a middle-ear infection treated?  A middle-ear infection may be treated with:    Antibiotics, taken by mouth or as ear drops    Medication for pain    Decongestants, antihistamines, or nasal steroids  Your health care provider may also have you try autoinsufflation. This helps adjust the air pressure in your ear. For this, you pinch your nose and gently exhale. This forces air back through the eustachian tube.  The exact treatment for your ear infection will depend on the type of infection you have. In general, if your symptoms don t get better in 48 to 72 hours, contact your health care provider.  Middle-ear infections can cause long-term problems if not treated. They can lead to:    Infection in other parts of the head    Permanent hearing loss    Paralysis of a nerve in your face  If you have a middle-ear infection that doesn t get better, you may need to see an ear, nose, and throat specialist (otolaryngologist). You may need a CT scan or MRI to check for head and neck cancer.  Ear tubes  Sometimes fluid stays in the middle ear even after you take antibiotics and the infection goes away. In this case, your health care provider may suggest that a small tube be placed in your ear. The tube is put at the opening of the eardrum. The tube keeps fluid from building up and relieves pressure in the middle ear. It can also help you hear better. This surgery is called myringotomy. It is not often done in adults.  The tubes usually fall out on their  "own after 6 months to a year.    3393-0562 [employer]. [address] [place] [address], [place] [address]. All rights reserved. This information is not intended as a substitute for professional medical care. Always follow your healthcare professional's instructions.                Follow-ups after your visit        Follow-up notes from your care team     Return in about 1 week (around 8/30/2018), or if symptoms worsen or fail to improve.      Who to contact     If you have questions or need follow up information about today's clinic visit or your schedule please contact [facility] directly at [phone].  Normal or non-critical lab and imaging results will be communicated to you by MyChart, letter or phone within 4 business days after the clinic has received the results. If you do not hear from us within 7 days, please contact the clinic through MyChart or phone. If you have a critical or abnormal lab result, we will notify you by phone as soon as possible.  Submit refill requests through [email or web address] or call your pharmacy and they will forward the refill request to us. Please allow 3 business days for your refill to be completed.          Additional Information About Your Visit        Care EveryWhere ID     This is your Care EveryWhere ID. This could be used by other organizations to access your [place] medical records  [id number]        Your Vitals Were     Temperature Respirations Height BMI (Body Mass Index)          97  F (36.1  C) (Tympanic) 18 5' 11\" (1.803 m) 30.68 kg/m2         Blood Pressure from Last 3 Encounters:   07/10/18 130/86   03/19/18 136/80   01/15/18 (!) 132/94    Weight from Last 3 Encounters:   08/23/18 220 lb (99.8 kg)   07/10/18 216 lb (98 kg)   01/15/18 262 lb (118.8 kg)              Today, you had the following     No orders found for display         Today's Medication Changes          These changes are accurate as of 8/23/18  9:01 AM.  If you have any questions, ask " your nurse or doctor.               Start taking these medicines.        Dose/Directions    amoxicillin 500 MG capsule   Commonly known as:  AMOXIL   Used for:  Other acute nonsuppurative otitis media of left ear, recurrence not specified   Started by:  Oral Christensen MD        Dose:  500 mg   Take 1 capsule (500 mg) by mouth 2 times daily for 10 days   Quantity:  20 capsule   Refills:  0            Where to get your medicines      These medications were sent to [facility] [address] [facility] [address]  [address] [facility] [address]     Phone:  [phone]     amoxicillin 500 MG capsule                Primary Care Provider Office Phone # Fax #    Andrea Sebastian Sandhu [phone] [address] [facility] [address]        Equal Access to Services     [facility] BRAVO : Kyaw alfaro hadasho Soomaali, waaxda luqadaha, qaybta kaalmada adeegyada, gladys fortune . So [facility] [phone].    ATENCIÓN: Si habla español, tiene a daniel disposición servicios gratuitos de asistencia lingüística. Llame al [phone].    We comply with applicable federal civil rights laws and [place] laws. We do not discriminate on the basis of race, color, national origin, age, disability, sex, sexual orientation, or gender identity.            Thank you!     Thank you for choosing [facility]  for your care. Our goal is always to provide you with excellent care. Hearing back from our patients is one way we can continue to improve our services. Please take a few minutes to complete the written survey that you may receive in the mail after your visit with us. Thank you!             Your Updated Medication List - Protect others around you: Learn how to safely use, store and throw away your medicines at www.disposemymeds.org.          This list is accurate as of 8/23/18  9:01 AM.  Always use your most recent med list.                   Brand Name Dispense  Instructions for use Diagnosis    albuterol 108 (90 Base) MCG/ACT inhaler    PROAIR HFA/PROVENTIL HFA/VENTOLIN HFA    1 Inhaler    Inhale 2 puffs into the lungs every 6 hours as needed for shortness of breath / dyspnea or wheezing    Cough       allopurinol 300 MG tablet    ZYLOPRIM    90 tablet    Take 1 tablet (300 mg) by mouth daily    Chronic gout involving toe without tophus, unspecified cause, unspecified laterality       amoxicillin 500 MG capsule    AMOXIL    20 capsule    Take 1 capsule (500 mg) by mouth 2 times daily for 10 days    Other acute nonsuppurative otitis media of left ear, recurrence not specified       lisinopril 20 MG tablet    PRINIVIL/ZESTRIL    90 tablet    Take 1 tablet (20 mg) by mouth daily    Essential hypertension, benign       omeprazole 20 MG CR capsule    priLOSEC    30 capsule    Take 1 capsule (20 mg) by mouth daily    Gastroesophageal reflux disease without esophagitis       sildenafil 20 MG tablet    REVATIO    50 tablet    Take as many pills as needed up to maximum of 5 pills at a time prior to intercourse.    Erectile dysfunction, unspecified erectile dysfunction type

## 2018-08-23 NOTE — PROGRESS NOTES
SUBJECTIVE:   Shon Sargent is a 57 year old male who presents to clinic today for the following health issues:      Plugged ear       Duration: about a week     Description (location/character/radiation): left ear     Intensity:  moderate    Accompanying signs and symptoms: Plugged tight last night, affecting hearing now, and left side of face is feeling a little numb     History (similar episodes/previous evaluation): None    Precipitating or alleviating factors: None    Therapies tried and outcome: Warm water and peroxide last night. Ear cleaning kit with flushing. Keeps getting worse         Problem list and histories reviewed & adjusted, as indicated.  Additional history: as documented    Patient Active Problem List   Diagnosis     Hypertension goal BP (blood pressure) < 140/90     Tremor     Gout     ED (erectile dysfunction)     Psoriasis     Esophageal reflux (GERD)     CARDIOVASCULAR SCREENING; LDL GOAL LESS THAN 130     Iron deficiency anemia     Past Surgical History:   Procedure Laterality Date     COLONOSCOPY N/A 10/8/2015    Procedure: COLONOSCOPY;  Surgeon: Rolando Ayers MD;  Location: [facility]     ESOPHAGOSCOPY, GASTROSCOPY, DUODENOSCOPY (EGD), COMBINED N/A 11/3/2014    Procedure: COMBINED ESOPHAGOSCOPY, GASTROSCOPY, DUODENOSCOPY (EGD);  Surgeon: Rolando Ayers MD;  Location: [facility]     ESOPHAGOSCOPY, GASTROSCOPY, DUODENOSCOPY (EGD), COMBINED N/A 10/8/2015    Procedure: COMBINED ESOPHAGOSCOPY, GASTROSCOPY, DUODENOSCOPY (EGD), BIOPSY SINGLE OR MULTIPLE;  Surgeon: Rolando Ayers MD;  Location: [facility]     HERNIA REPAIR  2007    umbilical       Social History   Substance Use Topics     Smoking status: Never Smoker     Smokeless tobacco: Never Used     Alcohol use Yes      Comment: occ'l     Family History   Problem Relation Age of Onset     Cancer Mother      lymphoma     Hypertension Father      Circulatory Father      GASTROINTESTINAL DISEASE Father      Lipids Father      Alzheimer Disease Maternal  "Grandfather      Anesthesia Reaction Maternal Grandfather      Cardiovascular Paternal Grandfather      HEART DISEASE Paternal Grandfather      Asthma Son      Asthma Son          Current Outpatient Prescriptions   Medication Sig Dispense Refill     albuterol (PROAIR HFA/PROVENTIL HFA/VENTOLIN HFA) 108 (90 BASE) MCG/ACT Inhaler Inhale 2 puffs into the lungs every 6 hours as needed for shortness of breath / dyspnea or wheezing 1 Inhaler 11     allopurinol (ZYLOPRIM) 300 MG tablet Take 1 tablet (300 mg) by mouth daily 90 tablet 3     amoxicillin (AMOXIL) 500 MG capsule Take 1 capsule (500 mg) by mouth 2 times daily for 10 days 20 capsule 0     lisinopril (PRINIVIL/ZESTRIL) 20 MG tablet Take 1 tablet (20 mg) by mouth daily 90 tablet 3     omeprazole (PRILOSEC) 20 MG CR capsule Take 1 capsule (20 mg) by mouth daily 30 capsule 3     sildenafil (REVATIO) 20 MG tablet Take as many pills as needed up to maximum of 5 pills at a time prior to intercourse. 50 tablet 3     Allergies   Allergen Reactions     Valdecoxib Hives     Recent Labs   Lab Test  02/13/18   0800  01/04/18   0957  03/13/15   0848   LDL   --   93  64   HDL   --   33*  28*   TRIG   --   336*  163*   ALT  64  80*   --    CR   --   0.90  0.95   GFRESTIMATED   --   88  83   GFRESTBLACK   --   >90  >90  African American GFR Calc     POTASSIUM   --   4.3  4.7      BP Readings from Last 3 Encounters:   07/10/18 130/86   03/19/18 136/80   01/15/18 (!) 132/94    Wt Readings from Last 3 Encounters:   08/23/18 220 lb (99.8 kg)   07/10/18 216 lb (98 kg)   01/15/18 262 lb (118.8 kg)                  Labs reviewed in EPIC    Reviewed and updated as needed this visit by clinical staff       Reviewed and updated as needed this visit by Provider         ROS:  Constitutional, HEENT, cardiovascular, pulmonary, gi and gu systems are negative, except as otherwise noted.    OBJECTIVE:     Temp 97  F (36.1  C) (Tympanic)  Resp 18  Ht 5' 11\" (1.803 m)  Wt 220 lb (99.8 kg)  BMI " 30.68 kg/m2  Body mass index is 30.68 kg/(m^2).  GENERAL: alert and no distress  EYES: Eyes grossly normal to inspection, PERRL and conjunctivae and sclerae normal  HENT: normal cephalic/atraumatic, right ear: normal: no effusions, no erythema, normal landmarks, left ear: effusion, erythematous and occluded with wax, nose and mouth without ulcers or lesions, oropharynx clear and oral mucous membranes moist  NECK: no adenopathy, no asymmetry, masses, or scars and thyroid normal to palpation  RESP: lungs clear to auscultation - no rales, rhonchi or wheezes  CV: regular rates and rhythm, normal S1 S2, no S3 or S4 and no murmur, click or rub  MS: no gross musculoskeletal defects noted, no edema      ASSESSMENT/PLAN:       1. Impacted cerumen of left ear  -Left ear partially impacted cerumen cleaned with irrigation by MA, no complication encountered.  Suggested to avoid using Q-tips and written information provided       2. Other acute nonsuppurative otitis media of left ear, recurrence not specified  -Tympanic membrane erythematous with effusion, possibly related to otitis media.  Amoxicillin prescribed, common side effect discussed  - amoxicillin (AMOXIL) 500 MG capsule; Take 1 capsule (500 mg) by mouth 2 times daily for 10 days  Dispense: 20 capsule; Refill: 0    Follow-up in 1 week or earlier if needed.  All questions answered.    Patient Instructions     Earwax, Home Treatment    Everyone produces earwax from the lining of the ear canal. It serves to lubricate and protect the ear. The wax that forms in the canal naturally moves toward the outside of the ear and falls out. Sometimes the ear canal may contain too much wax. This can cause a blockage and loss of hearing. Directions are given below for home treatment.  Home care  If your doctor has advised you to remove a wax blockage yourself, follow these directions:    Unless a medicine was prescribed, you may use an over-the-counter product made for clearing earwax.  These contain carbamide peroxide. Lie down with the blocked ear facing upward. Apply one dropper full of medicine and wait a few minutes. Grasp the outer ear and wiggle it to help the solution enter the canal.    Lean over a sink or basin with the blocked ear facing downward. Use a bulb syringe filled with warm (not hot or cold) water to rinse the ear several times. Use gentle pressure only.    If you are having trouble draining the water out of your ear canal, put a few drops of rubbing alcohol (isopropyl alcohol) into the ear canal. This will help remove the remaining water.    Repeat this procedure once a day for up to three days, or until your hearing is back to normal. Do not use this treatment for more than three days in a row.  Don ts    Don t use cold water to rinse the ear. This will make you dizzy.    Don t perform this procedure if you have an ear infection.    Don t perform this procedure if you have a ruptured eardrum.    Don t use cotton swabs, matches, hairpins, keys, or other objects to  clean  the ear canal. This can cause infection of the ear canal or rupture the eardrum. Because of their size and shape, cotton swabs can push earwax deeper into the ear canal instead of removing it.  Follow-up care  Follow up with your health care provider if you are not improving after three cleaning attempts, or as advised.  When to seek medical advice  Call your health care provider right away if any of these occur:    Worsening ear pain    Fever of 101 F (38.3 C) or higher, or as directed by your health care provider    Hearing does not return to normal after three days of treatment    Fluid drainage or bleeding from the ear canal    Swelling, redness, or tenderness of the outer ear    Headache, neck pain, or stiff neck    3007-5989 [employer]. [address] [place] [address], [place] [address]. All rights reserved. This information is not intended as a substitute for professional medical care. Always  follow your healthcare professional's instructions.        Otitis Media (Middle-Ear Infection) in Adults  Otitis media is another name for a middle-ear infection. It means an infection behind your eardrum. This kind of ear infection can happen after any condition that keeps fluid from draining from the middle ear. These conditions include allergies, a cold, a sore throat, or a respiratory infection.  Middle-ear infections are common in children, but they can also happen in adults. An ear infection in an adult may mean a more serious problem than in a child. So you may need additional tests. If you have an ear infection, you should see your health care provider for treatment.  What are the types of middle-ear infections?  Infections can affect the middle ear in several ways. They are:    Acute otitis media. This middle-ear infection occurs suddenly. It causes swelling and redness. Fluid and mucus become trapped inside the ear. You can have a fever and ear pain.    Otitis media with effusion. Fluid (effusion) and mucus build up in the middle ear after the infection goes away. You may feel like your middle ear is full. This can continue for months and may affect your hearing.    Chronic otitis media with effusion. Fluid (effusion) remains in the middle ear for a long time. Or it builds up again and again, even though there is no infection. This type of middle-ear infection may be hard to treat. It may also affect your hearing.  Who is more likely to get a middle-ear infection?  You are more likely to get an ear infection if you:    Smoke or are around someone who smokes    Have seasonal or year-round allergy symptoms    Have a cold or other upper respiratory infection  What causes a middle-ear infection?  The middle ear connects to the throat by a canal called the eustachian tube. This tube helps even out the pressure between the outer ear and the inner ear. A cold or allergy can irritate the tube or cause the area  around it to swell. This can keep fluid from draining from the middle ear. The fluid builds up behind the eardrum. Bacteria and viruses can grow in this fluid. The bacteria and viruses cause the middle-ear infection.  What are the symptoms of a middle-ear infection?  Common symptoms of a middle-ear infection in adults are:    Pain in 1 or both ears    Drainage from the ear    Muffled hearing    Sore throat   You may also have a fever. Rarely, your balance can be affected.  These symptoms may be the same as for other conditions. It s important to talk with your health care provider if you think you have a middle-ear infection. If you have a high fever, severe pain behind your ear, or paralysis in your face, see your provider as soon as you can.  How is a middle-ear infection diagnosed?  Your health care provider will take a medical history and do a physical exam. He or she will look at the outer ear and eardrum with an otoscope. The otoscope is a lighted tool that lets your provider see inside the ear. A pneumatic otoscope blows a puff of air into the ear to check how well your eardrum moves. If you eardrum doesn t move well, it may mean you have fluid behind it.  Your provider may also do a test called tympanometry. This test tells how well the middle ear is working. It can find any changes in pressure in the middle ear. Your provider may test your hearing with a tuning fork.  How is a middle-ear infection treated?  A middle-ear infection may be treated with:    Antibiotics, taken by mouth or as ear drops    Medication for pain    Decongestants, antihistamines, or nasal steroids  Your health care provider may also have you try autoinsufflation. This helps adjust the air pressure in your ear. For this, you pinch your nose and gently exhale. This forces air back through the eustachian tube.  The exact treatment for your ear infection will depend on the type of infection you have. In general, if your symptoms don t get  better in 48 to 72 hours, contact your health care provider.  Middle-ear infections can cause long-term problems if not treated. They can lead to:    Infection in other parts of the head    Permanent hearing loss    Paralysis of a nerve in your face  If you have a middle-ear infection that doesn t get better, you may need to see an ear, nose, and throat specialist (otolaryngologist). You may need a CT scan or MRI to check for head and neck cancer.  Ear tubes  Sometimes fluid stays in the middle ear even after you take antibiotics and the infection goes away. In this case, your health care provider may suggest that a small tube be placed in your ear. The tube is put at the opening of the eardrum. The tube keeps fluid from building up and relieves pressure in the middle ear. It can also help you hear better. This surgery is called myringotomy. It is not often done in adults.  The tubes usually fall out on their own after 6 months to a year.    5528-0128 [employer]. [address] [place] [address], [place] [address]. All rights reserved. This information is not intended as a substitute for professional medical care. Always follow your healthcare professional's instructions.            Oral Christensen MD  [facility]

## 2018-09-05 ENCOUNTER — OFFICE VISIT (OUTPATIENT)
Dept: FAMILY MEDICINE | Facility: CLINIC | Age: 58
End: 2018-09-05
Payer: COMMERCIAL

## 2018-09-05 VITALS
SYSTOLIC BLOOD PRESSURE: 123 MMHG | BODY MASS INDEX: 31.24 KG/M2 | DIASTOLIC BLOOD PRESSURE: 87 MMHG | TEMPERATURE: 96.8 F | WEIGHT: 224 LBS | RESPIRATION RATE: 18 BRPM | OXYGEN SATURATION: 98 % | HEART RATE: 71 BPM

## 2018-09-05 DIAGNOSIS — H66.012 ACUTE SUPPURATIVE OTITIS MEDIA OF LEFT EAR WITH SPONTANEOUS RUPTURE OF TYMPANIC MEMBRANE, RECURRENCE NOT SPECIFIED: ICD-10-CM

## 2018-09-05 DIAGNOSIS — J34.89 RHINORRHEA: ICD-10-CM

## 2018-09-05 DIAGNOSIS — H72.92 RUPTURED EAR DRUM, LEFT: Primary | ICD-10-CM

## 2018-09-05 PROCEDURE — 99214 OFFICE O/P EST MOD 30 MIN: CPT | Performed by: NURSE PRACTITIONER

## 2018-09-05 RX ORDER — CIPROFLOXACIN AND DEXAMETHASONE 3; 1 MG/ML; MG/ML
4 SUSPENSION/ DROPS AURICULAR (OTIC) 2 TIMES DAILY
Qty: 7.5 ML | Refills: 0 | Status: CANCELLED | OUTPATIENT
Start: 2018-09-05 | End: 2018-09-12

## 2018-09-05 RX ORDER — FLUTICASONE PROPIONATE 50 MCG
1-2 SPRAY, SUSPENSION (ML) NASAL DAILY
Qty: 16 G | Refills: 0 | COMMUNITY
Start: 2018-09-05 | End: 2018-09-10

## 2018-09-05 RX ORDER — OFLOXACIN 3 MG/ML
5 SOLUTION AURICULAR (OTIC) DAILY
Qty: 10 ML | Refills: 0 | Status: SHIPPED | OUTPATIENT
Start: 2018-09-05 | End: 2018-09-12

## 2018-09-05 ASSESSMENT — PAIN SCALES - GENERAL: PAINLEVEL: MODERATE PAIN (5)

## 2018-09-05 NOTE — PATIENT INSTRUCTIONS
1. Ruptured ear drum, left  Acute, improving  - OTOLARYNGOLOGY REFERRAL  - ofloxacin (FLOXIN) 0.3 % otic solution; Place 5 drops Into the left ear daily for 7 days  Dispense: 10 mL; Refill: 0    2. Acute suppurative otitis media of left ear with spontaneous rupture of tympanic membrane, recurrence not specified  Acute  - OTOLARYNGOLOGY REFERRAL  - ofloxacin (FLOXIN) 0.3 % otic solution; Place 5 drops Into the left ear daily for 7 days  Dispense: 10 mL; Refill: 0    3. Rhinorrhea  Acute  - fluticasone (FLONASE) 50 MCG/ACT spray; Spray 1-2 sprays into both nostrils daily  Dispense: 16 g; Refill: 0  Use snort technique  Continue allergy medication      Eardrum Rupture (Perforation)    Your eardrum is a thin membrane between your outer and middle ear. Sound waves entering your ear cause the membrane to vibrate. This helps you hear. An injury or infection can cause your eardrum to tear (rupture). This creates a hole (perforation) that may affect your hearing.  Causes of eardrum perforation  Causes of a ruptured eardrum include:    Pressure from an ear infection    Putting an object such as a cotton swab or pencil into the ear    A very loud noise such as a gunshot close to the ear    Rapid changes in air pressure. These can happen during scuba diving or traveling at high altitudes.    A slap or blow to the ear  When to go to the emergency room (ER)  Seek medical care right away if you:    Have severe pain, bleeding, or ringing in your ear.    Lose your hearing suddenly.    Become very dizzy for no reason.    Have an object lodged in your ear.  A ruptured eardrum from an ear infection usually isn't an emergency. In fact, the rupture often relieves pressure and pain. It usually heals within hours or days. But you should have the ear looked at by a healthcare provider within 24 hours.  What to expect in the ER  Your ear will be examined. Treatment will depend on how severe the damage is. Small holes often heal on their own.  A small patch may be placed over a minor eardrum tear. Large tears may need to be repaired during an operation. If you are very dizzy or have severe hearing loss, you are likely to stay in the hospital for treatment for one or more days.  Don't clean inside the ear canal with cotton swabs or any other object.   Date Last Reviewed: 10/1/2016    9412-4421 [employer]. [address] [place] [address], [place] [address]. All rights reserved. This information is not intended as a substitute for professional medical care. Always follow your healthcare professional's instructions.

## 2018-09-05 NOTE — PROGRESS NOTES
SUBJECTIVE:   Shon Sargent is a 57 year old male who presents to clinic today for the following health issues:      Recheck OM left ear      Duration: treated on 8/23/2018 in [facility], Janna Agrawal MD on 8/31/2018    Description (location/character/radiation): Other acute nonsuppurative otitis media of left ear, recurrence not specified  -Tympanic membrane erythematous with effusion, possibly related to otitis media.  Amoxicillin prescribed 8/23/2018    Had a ruptured drum on 8/31/2018    Intensity:  5/10    Accompanying signs and symptoms: hearing loss and drainage    History (similar episodes/previous evaluation): 8/23/2018 [facility] and Janna Mirza MD appointment on 8/31/2018    Precipitating or alleviating factors: 8/30/2018 had to fly and possibly ruptured drum then    Therapies tried and outcome: - amoxicillin (AMOXIL) 500 MG capsule; Take 1 capsule (500 mg) by mouth 2 times daily for 10 days  Dispense: 20 capsule; Refill: 0-took until 8/31/2018,     then prescribed Levoflaxacin 1000mg day one and 500mg daily since, and Cetrizine 10 mg daily.    Patient continues to have hearing loss and ear drainage     HPI:   PCP:  Andrea Sandhu [phone]    Patient Active Problem List   Diagnosis     Hypertension goal BP (blood pressure) < 140/90     Tremor     Gout     ED (erectile dysfunction)     Psoriasis     Esophageal reflux (GERD)     CARDIOVASCULAR SCREENING; LDL GOAL LESS THAN 130     Iron deficiency anemia     Current Outpatient Prescriptions   Medication     albuterol (PROAIR HFA/PROVENTIL HFA/VENTOLIN HFA) 108 (90 BASE) MCG/ACT Inhaler     allopurinol (ZYLOPRIM) 300 MG tablet     fluticasone (FLONASE) 50 MCG/ACT spray     lisinopril (PRINIVIL/ZESTRIL) 20 MG tablet     ofloxacin (FLOXIN) 0.3 % otic solution     omeprazole (PRILOSEC) 20 MG CR capsule     sildenafil (REVATIO) 20 MG tablet     No current facility-administered medications for this visit.        Health Maintenance Due   Topic  Date Due     HIV SCREEN (SYSTEM ASSIGNED)  10/08/1979     TETANUS IMMUNIZATION (SYSTEM ASSIGNED)  01/01/2013     ADVANCE DIRECTIVE PLANNING Q5 YRS  10/08/2016     INFLUENZA VACCINE (1) 09/01/2018       Reviewed and updated:  Tobacco  Allergies  Meds  Med Hx  Surg Hx  Fam Hx  Soc Hx     ROS:  Constitutional, HEENT, cardiovascular, pulmonary, gi and gu systems are negative, except as otherwise noted.    PHYSICAL EXAM:   /87  Pulse 71  Temp 96.8  F (36  C) (Tympanic)  Resp 18  Wt 224 lb (101.6 kg)  SpO2 98%  BMI 31.24 kg/m2  Body mass index is 31.24 kg/(m^2).  GENERAL APPEARANCE: healthy, alert and no distress  EYES: Eyes grossly normal to inspection, PERRL and conjunctivae and sclerae normal  HENT: ear canals and TM's normal, nose and mouth without ulcers or lesions and TM fluid left  NECK: no adenopathy, no asymmetry, masses, or scars and thyroid normal to palpation  RESP: lungs clear to auscultation - no rales, rhonchi or wheezes  CV: regular rates and rhythm, normal S1 S2, no S3 or S4 and no murmur, click or rub  MS: extremities normal- no gross deformities noted  PSYCH: mentation appears normal and affect normal/bright    ASSESSMENT & PLAN:     1. Ruptured ear drum, left  Acute, improving  - OTOLARYNGOLOGY REFERRAL  - ofloxacin (FLOXIN) 0.3 % otic solution; Place 5 drops Into the left ear daily for 7 days  Dispense: 10 mL; Refill: 0    2. Acute suppurative otitis media of left ear with spontaneous rupture of tympanic membrane, recurrence not specified  Acute  - OTOLARYNGOLOGY REFERRAL  - ofloxacin (FLOXIN) 0.3 % otic solution; Place 5 drops Into the left ear daily for 7 days  Dispense: 10 mL; Refill: 0    3. Rhinorrhea  Acute  - fluticasone (FLONASE) 50 MCG/ACT spray; Spray 1-2 sprays into both nostrils daily  Dispense: 16 g; Refill: 0  Use snort technique  Continue allergy medication      Eardrum Rupture (Perforation)    Your eardrum is a thin membrane between your outer and middle ear. Sound  waves entering your ear cause the membrane to vibrate. This helps you hear. An injury or infection can cause your eardrum to tear (rupture). This creates a hole (perforation) that may affect your hearing.  Causes of eardrum perforation  Causes of a ruptured eardrum include:    Pressure from an ear infection    Putting an object such as a cotton swab or pencil into the ear    A very loud noise such as a gunshot close to the ear    Rapid changes in air pressure. These can happen during scuba diving or traveling at high altitudes.    A slap or blow to the ear  When to go to the emergency room (ER)  Seek medical care right away if you:    Have severe pain, bleeding, or ringing in your ear.    Lose your hearing suddenly.    Become very dizzy for no reason.    Have an object lodged in your ear.  A ruptured eardrum from an ear infection usually isn't an emergency. In fact, the rupture often relieves pressure and pain. It usually heals within hours or days. But you should have the ear looked at by a healthcare provider within 24 hours.  What to expect in the ER  Your ear will be examined. Treatment will depend on how severe the damage is. Small holes often heal on their own. A small patch may be placed over a minor eardrum tear. Large tears may need to be repaired during an operation. If you are very dizzy or have severe hearing loss, you are likely to stay in the hospital for treatment for one or more days.  Don't clean inside the ear canal with cotton swabs or any other object.   Date Last Reviewed: 10/1/2016    6490-3985 [employer]. [address] [place] [address], [place] [address]. All rights reserved. This information is not intended as a substitute for professional medical care. Always follow your healthcare professional's instructions.          Risks, benefits, side effects and rationale for treatment plan fully discussed with the patient and understanding expressed.    Tami Estes, GATO-BC  [place]  [facility]

## 2018-09-05 NOTE — NURSING NOTE
"Chief Complaint   Patient presents with     Otitis Media       Initial There were no vitals taken for this visit. Estimated body mass index is 30.68 kg/(m^2) as calculated from the following:    Height as of 8/23/18: 5' 11\" (1.803 m).    Weight as of 8/23/18: 220 lb (99.8 kg).      Health Maintenance that is potentially due pending provider review:  NONE    n/a        "

## 2018-09-05 NOTE — MR AVS SNAPSHOT
After Visit Summary   9/5/2018    Shon Sargent    MRN: [id number]           Patient Information     Date Of Birth          1960        Visit Information        Provider Department      9/5/2018 8:40 AM Tami Estes, CNP [facility]        Today's Diagnoses     Ruptured ear drum, left    -  1    Acute suppurative otitis media of left ear with spontaneous rupture of tympanic membrane, recurrence not specified        Rhinorrhea          Care Instructions    1. Ruptured ear drum, left  Acute, improving  - OTOLARYNGOLOGY REFERRAL  - ofloxacin (FLOXIN) 0.3 % otic solution; Place 5 drops Into the left ear daily for 7 days  Dispense: 10 mL; Refill: 0    2. Acute suppurative otitis media of left ear with spontaneous rupture of tympanic membrane, recurrence not specified  Acute  - OTOLARYNGOLOGY REFERRAL  - ofloxacin (FLOXIN) 0.3 % otic solution; Place 5 drops Into the left ear daily for 7 days  Dispense: 10 mL; Refill: 0    3. Rhinorrhea  Acute  - fluticasone (FLONASE) 50 MCG/ACT spray; Spray 1-2 sprays into both nostrils daily  Dispense: 16 g; Refill: 0  Use snort technique  Continue allergy medication      Eardrum Rupture (Perforation)    Your eardrum is a thin membrane between your outer and middle ear. Sound waves entering your ear cause the membrane to vibrate. This helps you hear. An injury or infection can cause your eardrum to tear (rupture). This creates a hole (perforation) that may affect your hearing.  Causes of eardrum perforation  Causes of a ruptured eardrum include:    Pressure from an ear infection    Putting an object such as a cotton swab or pencil into the ear    A very loud noise such as a gunshot close to the ear    Rapid changes in air pressure. These can happen during scuba diving or traveling at high altitudes.    A slap or blow to the ear  When to go to the emergency room (ER)  Seek medical care right away if you:    Have severe pain, bleeding, or  ringing in your ear.    Lose your hearing suddenly.    Become very dizzy for no reason.    Have an object lodged in your ear.  A ruptured eardrum from an ear infection usually isn't an emergency. In fact, the rupture often relieves pressure and pain. It usually heals within hours or days. But you should have the ear looked at by a healthcare provider within 24 hours.  What to expect in the ER  Your ear will be examined. Treatment will depend on how severe the damage is. Small holes often heal on their own. A small patch may be placed over a minor eardrum tear. Large tears may need to be repaired during an operation. If you are very dizzy or have severe hearing loss, you are likely to stay in the hospital for treatment for one or more days.  Don't clean inside the ear canal with cotton swabs or any other object.   Date Last Reviewed: 10/1/2016    4066-5981 [employer]. [address] [place] [address]. All rights reserved. This information is not intended as a substitute for professional medical care. Always follow your healthcare professional's instructions.                Follow-ups after your visit        Additional Services     OTOLARYNGOLOGY REFERRAL       Your provider has referred you to: [facility]: [facility] [phone]   [email or web address]    Please be aware that coverage of these services is subject to the terms and limitations of your health insurance plan.  Call member services at your health plan with any benefit or coverage questions.      Please bring the following with you to your appointment:    (1) Any X-Rays, CTs or MRIs which have been performed.  Contact the facility where they were done to arrange for  prior to your scheduled appointment.   (2) List of current medications  (3) This referral request   (4) Any documents/labs given to you for this referral                  Who to contact     If you have questions or need  follow up information about today's clinic visit or your schedule please contact [facility] directly at [phone].  Normal or non-critical lab and imaging results will be communicated to you by MyChart, letter or phone within 4 business days after the clinic has received the results. If you do not hear from us within 7 days, please contact the clinic through MyChart or phone. If you have a critical or abnormal lab result, we will notify you by phone as soon as possible.  Submit refill requests through [employer] or call your pharmacy and they will forward the refill request to us. Please allow 3 business days for your refill to be completed.          Additional Information About Your Visit        Care EveryWhere ID     This is your Care EveryWhere ID. This could be used by other organizations to access your [facility] medical records  [id number]        Your Vitals Were     Pulse Temperature Respirations Pulse Oximetry BMI (Body Mass Index)       71 96.8  F (36  C) (Tympanic) 18 98% 31.24 kg/m2        Blood Pressure from Last 3 Encounters:   09/05/18 123/87   07/10/18 130/86   03/19/18 136/80    Weight from Last 3 Encounters:   09/05/18 224 lb (101.6 kg)   08/23/18 220 lb (99.8 kg)   07/10/18 216 lb (98 kg)              We Performed the Following     OTOLARYNGOLOGY REFERRAL          Today's Medication Changes          These changes are accurate as of 9/5/18  9:24 AM.  If you have any questions, ask your nurse or doctor.               Start taking these medicines.        Dose/Directions    fluticasone 50 MCG/ACT spray   Commonly known as:  FLONASE   Used for:  Rhinorrhea   Started by:  Tami Estes CNP        Dose:  1-2 spray   Spray 1-2 sprays into both nostrils daily   Quantity:  16 g   Refills:  0       ofloxacin 0.3 % otic solution   Commonly known as:  FLOXIN   Used for:  Ruptured ear drum, left, Acute suppurative otitis media of left ear with spontaneous rupture of tympanic membrane,  recurrence not specified   Started by:  Tami Estes CNP        Dose:  5 drop   Place 5 drops Into the left ear daily for 7 days   Quantity:  10 mL   Refills:  0            Where to get your medicines      These medications were sent to [id number] - [place], [place] - [address] [facility]  [address] [facility] [address]     Phone:  [phone]     ofloxacin 0.3 % otic solution         Some of these will need a paper prescription and others can be bought over the counter.  Ask your nurse if you have questions.     You don't need a prescription for these medications     fluticasone 50 MCG/ACT spray                Primary Care Provider Office Phone # Fax #    Andrea Sandhu [phone] [address] [facility] [address]        Equal Access to Services     CHAPARRITA CORDON : Kyaw Brower, waaxda luqadaha, qaybta kaalmakimberly velasco, gladys fortune . So [facility] [phone].    ATENCIÓN: Si habla español, tiene a daniel disposición servicios gratuitos de asistencia lingüística. Llame al [phone].    We comply with applicable federal civil rights laws and [place] laws. We do not discriminate on the basis of race, color, national origin, age, disability, sex, sexual orientation, or gender identity.            Thank you!     Thank you for choosing [facility]  for your care. Our goal is always to provide you with excellent care. Hearing back from our patients is one way we can continue to improve our services. Please take a few minutes to complete the written survey that you may receive in the mail after your visit with us. Thank you!             Your Updated Medication List - Protect others around you: Learn how to safely use, store and throw away your medicines at www.disposemymeds.org.          This list is accurate as of 9/5/18  9:24 AM.  Always use your most recent med list.                   Brand Name Dispense  Instructions for use Diagnosis    albuterol 108 (90 Base) MCG/ACT inhaler    PROAIR HFA/PROVENTIL HFA/VENTOLIN HFA    1 Inhaler    Inhale 2 puffs into the lungs every 6 hours as needed for shortness of breath / dyspnea or wheezing    Cough       allopurinol 300 MG tablet    ZYLOPRIM    90 tablet    Take 1 tablet (300 mg) by mouth daily    Chronic gout involving toe without tophus, unspecified cause, unspecified laterality       fluticasone 50 MCG/ACT spray    FLONASE    16 g    Spray 1-2 sprays into both nostrils daily    Rhinorrhea       lisinopril 20 MG tablet    PRINIVIL/ZESTRIL    90 tablet    Take 1 tablet (20 mg) by mouth daily    Essential hypertension, benign       ofloxacin 0.3 % otic solution    FLOXIN    10 mL    Place 5 drops Into the left ear daily for 7 days    Ruptured ear drum, left, Acute suppurative otitis media of left ear with spontaneous rupture of tympanic membrane, recurrence not specified       omeprazole 20 MG CR capsule    priLOSEC    30 capsule    Take 1 capsule (20 mg) by mouth daily    Gastroesophageal reflux disease without esophagitis       sildenafil 20 MG tablet    REVATIO    50 tablet    Take as many pills as needed up to maximum of 5 pills at a time prior to intercourse.    Erectile dysfunction, unspecified erectile dysfunction type

## 2018-09-10 ENCOUNTER — OFFICE VISIT (OUTPATIENT)
Dept: OTOLARYNGOLOGY | Facility: CLINIC | Age: 58
End: 2018-09-10
Payer: COMMERCIAL

## 2018-09-10 VITALS
SYSTOLIC BLOOD PRESSURE: 139 MMHG | BODY MASS INDEX: 31.24 KG/M2 | DIASTOLIC BLOOD PRESSURE: 79 MMHG | WEIGHT: 224 LBS | TEMPERATURE: 98.4 F

## 2018-09-10 DIAGNOSIS — H61.892 POLYP OF EAR CANAL, LEFT: Primary | ICD-10-CM

## 2018-09-10 PROCEDURE — 99204 OFFICE O/P NEW MOD 45 MIN: CPT | Performed by: OTOLARYNGOLOGY

## 2018-09-10 RX ORDER — PREDNISOLONE ACETATE 10 MG/ML
5 SUSPENSION/ DROPS OPHTHALMIC 2 TIMES DAILY
Qty: 4 ML | Refills: 0 | Status: SHIPPED | OUTPATIENT
Start: 2018-09-10 | End: 2018-09-17

## 2018-09-10 ASSESSMENT — PAIN SCALES - GENERAL: PAINLEVEL: NO PAIN (0)

## 2018-09-10 NOTE — NURSING NOTE
"Initial /79 (BP Location: Right arm, Patient Position: Chair, Cuff Size: Adult Large)  Temp 98.4  F (36.9  C) (Oral)  Wt 101.6 kg (224 lb)  BMI 31.24 kg/m2 Estimated body mass index is 31.24 kg/(m^2) as calculated from the following:    Height as of 8/23/18: 1.803 m (5' 11\").    Weight as of this encounter: 101.6 kg (224 lb). .    Phoebe Chau LPN    "

## 2018-09-10 NOTE — MR AVS SNAPSHOT
After Visit Summary   9/10/2018    Shon Sargent    MRN: [id number]           Patient Information     Date Of Birth          1960        Visit Information        Provider Department      9/10/2018 12:45 PM Arian Hayden MD [facility]        Today's Diagnoses     Polyp of ear canal, left    -  1      Care Instructions    Per physician's instructions              Follow-ups after your visit        Your next 10 appointments already scheduled     Sep 25, 2018  8:30 AM CDT   Return Visit with Arian Hayden MD   [facility] ([facility])    [address] [facility] [address]   [phone]              Who to contact     If you have questions or need follow up information about today's clinic visit or your schedule please contact [facility] directly at [phone].  Normal or non-critical lab and imaging results will be communicated to you by MyChart, letter or phone within 4 business days after the clinic has received the results. If you do not hear from us within 7 days, please contact the clinic through MyChart or phone. If you have a critical or abnormal lab result, we will notify you by phone as soon as possible.  Submit refill requests through [employer] or call your pharmacy and they will forward the refill request to us. Please allow 3 business days for your refill to be completed.          Additional Information About Your Visit        Care EveryWhere ID     This is your Care EveryWhere ID. This could be used by other organizations to access your [facility] medical records  [id number]        Your Vitals Were     Temperature BMI (Body Mass Index)                98.4  F (36.9  C) (Oral) 31.24 kg/m2           Blood Pressure from Last 3 Encounters:   09/10/18 139/79   09/05/18 123/87   07/10/18 130/86    Weight from Last 3 Encounters:   09/10/18 101.6 kg (224 lb)   09/05/18 101.6 kg (224 lb)   08/23/18 99.8 kg (220 lb)               Today, you had the following     No orders found for display         Today's Medication Changes          These changes are accurate as of 9/10/18  9:55 PM.  If you have any questions, ask your nurse or doctor.               Start taking these medicines.        Dose/Directions    prednisoLONE acetate 1 % ophthalmic susp   Commonly known as:  PRED FORTE   Used for:  Polyp of ear canal, left   Started by:  Arian Hayden MD        Dose:  5 drop   Place 5 drops Into the left ear 2 times daily for 7 days   Quantity:  4 mL   Refills:  0            Where to get your medicines      These medications were sent to [facility] [address] [place] [address]  [address] [facility] [address]     Phone:  [phone]     prednisoLONE acetate 1 % ophthalmic susp                Primary Care Provider Office Phone # Fax #    Andrea Sandhu [phone] [address] [facility] [address]        Equal Access to Services     [facility] HospitalEMMANUELLE : Hadii red lozanoo Socharley, waaxda luqadaha, qaybta kaalmada adeegyada, gladys fortune . So [facility] [phone].    ATENCIÓN: Si habla español, tiene a daniel disposición servicios gratuitos de asistencia lingüística. Areli al [phone].    We comply with applicable federal civil rights laws and [place] laws. We do not discriminate on the basis of race, color, national origin, age, disability, sex, sexual orientation, or gender identity.            Thank you!     Thank you for choosing [facility]  for your care. Our goal is always to provide you with excellent care. Hearing back from our patients is one way we can continue to improve our services. Please take a few minutes to complete the written survey that you may receive in the mail after your visit with us. Thank you!             Your Updated Medication List - Protect others around you: Learn how to safely use, store and throw away your medicines at  www.disposemymeds.org.          This list is accurate as of 9/10/18  9:55 PM.  Always use your most recent med list.                   Brand Name Dispense Instructions for use Diagnosis    albuterol 108 (90 Base) MCG/ACT inhaler    PROAIR HFA/PROVENTIL HFA/VENTOLIN HFA    1 Inhaler    Inhale 2 puffs into the lungs every 6 hours as needed for shortness of breath / dyspnea or wheezing    Cough       allopurinol 300 MG tablet    ZYLOPRIM    90 tablet    Take 1 tablet (300 mg) by mouth daily    Chronic gout involving toe without tophus, unspecified cause, unspecified laterality       lisinopril 20 MG tablet    PRINIVIL/ZESTRIL    90 tablet    Take 1 tablet (20 mg) by mouth daily    Essential hypertension, benign       ofloxacin 0.3 % otic solution    FLOXIN    10 mL    Place 5 drops Into the left ear daily for 7 days    Ruptured ear drum, left, Acute suppurative otitis media of left ear with spontaneous rupture of tympanic membrane, recurrence not specified       omeprazole 20 MG CR capsule    priLOSEC    30 capsule    Take 1 capsule (20 mg) by mouth daily    Gastroesophageal reflux disease without esophagitis       prednisoLONE acetate 1 % ophthalmic susp    PRED FORTE    4 mL    Place 5 drops Into the left ear 2 times daily for 7 days    Polyp of ear canal, left       sildenafil 20 MG tablet    REVATIO    50 tablet    Take as many pills as needed up to maximum of 5 pills at a time prior to intercourse.    Erectile dysfunction, unspecified erectile dysfunction type

## 2018-09-10 NOTE — PROGRESS NOTES
History of Present Illness - Shon Sargent is a 57 year old male who presents with a 2 week history of feeling his hearing is down on the left ear. The hearing seems to worsen when he bends his head forward. He denies vertigo. He had some drainage from the left ear when sleeping. He was told by another doctor that he had a hole in the TM. He denies prior ear surgery or significant water exposure.    Past Medical History -   Patient Active Problem List   Diagnosis     Hypertension goal BP (blood pressure) < 140/90     Tremor     Gout     ED (erectile dysfunction)     Psoriasis     Esophageal reflux (GERD)     CARDIOVASCULAR SCREENING; LDL GOAL LESS THAN 130     Iron deficiency anemia       Current Medications -   Current Outpatient Prescriptions:      allopurinol (ZYLOPRIM) 300 MG tablet, Take 1 tablet (300 mg) by mouth daily, Disp: 90 tablet, Rfl: 3     lisinopril (PRINIVIL/ZESTRIL) 20 MG tablet, Take 1 tablet (20 mg) by mouth daily, Disp: 90 tablet, Rfl: 3     ofloxacin (FLOXIN) 0.3 % otic solution, Place 5 drops Into the left ear daily for 7 days, Disp: 10 mL, Rfl: 0     omeprazole (PRILOSEC) 20 MG CR capsule, Take 1 capsule (20 mg) by mouth daily, Disp: 30 capsule, Rfl: 3     prednisoLONE acetate (PRED FORTE) 1 % ophthalmic susp, Place 5 drops Into the left ear 2 times daily for 7 days, Disp: 4 mL, Rfl: 0     albuterol (PROAIR HFA/PROVENTIL HFA/VENTOLIN HFA) 108 (90 BASE) MCG/ACT Inhaler, Inhale 2 puffs into the lungs every 6 hours as needed for shortness of breath / dyspnea or wheezing (Patient not taking: Reported on 9/10/2018), Disp: 1 Inhaler, Rfl: 11     sildenafil (REVATIO) 20 MG tablet, Take as many pills as needed up to maximum of 5 pills at a time prior to intercourse., Disp: 50 tablet, Rfl: 3    Allergies -   Allergies   Allergen Reactions     Valdecoxib Hives       Social History -   Social History     Social History     Marital status:      Spouse name: N/A     Number of children: N/A      Years of education: N/A     Social History Main Topics     Smoking status: Never Smoker     Smokeless tobacco: Never Used     Alcohol use Yes      Comment: occ'l     Drug use: No     Sexual activity: Yes     Partners: Female     Other Topics Concern     Parent/Sibling W/ Cabg, Mi Or Angioplasty Before 65f 55m? No     Social History Narrative       Family History -   Family History   Problem Relation Age of Onset     Cancer Mother      lymphoma     Hypertension Father      Circulatory Father      GASTROINTESTINAL DISEASE Father      Lipids Father      HEART DISEASE Father      Alzheimer Disease Maternal Grandfather      Anesthesia Reaction Maternal Grandfather      Cardiovascular Paternal Grandfather      HEART DISEASE Paternal Grandfather      Asthma Son      allergy induced     Asthma Son      al;lergy induced       Review of Systems - As per HPI and PMHx, otherwise 7 system review of the head and neck negative. 10+ system review negative.    Physical Exam  /79 (BP Location: Right arm, Patient Position: Chair, Cuff Size: Adult Large)  Temp 98.4  F (36.9  C) (Oral)  Wt 101.6 kg (224 lb)  BMI 31.24 kg/m2  General - The patient is well nourished and well developed, and appears to have good nutritional status.  Alert and oriented to person and place, answers questions and cooperates with examination appropriately.   Head and Face - Normocephalic and atraumatic, with no gross asymmetry noted of the contour of the facial features.  The facial nerve is intact, with strong symmetric movements.  Voice and Breathing - The patient was breathing comfortably without the use of accessory muscles. There was no wheezing, stridor, or stertor.  The patients voice was clear and strong, and had appropriate pitch and quality.  Ears - right ear canal is clear. Right TM intact without effusion. Left ear canal with fungal debris. Polypoid inflammation on the lateral surface of the TM with possible middle ear serous effusion. Able  to autoinsufflate the left ear well. No perforation noted.  Eyes - Extraocular movements intact.  Sclera were not icteric or injected, conjunctiva were pink and moist.  Mouth - Examination of the oral cavity showed pink, healthy oral mucosa. No lesions or ulcerations noted.  The tongue was mobile and midline, and the dentition were in good condition.    Throat - The walls of the oropharynx were smooth, pink, moist, symmetric, and had no lesions or ulcerations.  The tonsillar pillars and soft palate were symmetric.  The uvula was midline on elevation.  Neck - Normal midline excursion of the laryngotracheal complex during swallowing.  Full range of motion on passive movement.  Palpation of the occipital, submental, submandibular, internal jugular chain, and supraclavicular nodes did not demonstrate any abnormal lymph nodes or masses.  The carotid pulse was palpable bilaterally.  Palpation of the thyroid was soft and smooth, with no nodules or goiter appreciated.  The trachea was mobile and midline.  Nose - External contour is symmetric, no gross deflection or scars.  Nasal mucosa is pink and moist with no abnormal mucus.  The septum was midline and non-obstructive, turbinates of normal size and position.  No polyps, masses, or purulence noted on examination.          Assessment - Shon Sargent is a 57 year old male with left fungal otitis, likely secondary to aural polyp on the malleus. I started prednisone drops to help calm down the polyp. I also recommended frequent autoinsufflate to help clear the middle ear fluid. Return in 2 weeks for recheck.       Dr. Arian Hayden MD  Otolaryngology  [facility]

## 2018-09-10 NOTE — LETTER
9/10/2018         RE: Shon Sargent  [address]  [facility] [address]        Dear Colleague,    Thank you for referring your patient, Shon Sargent, to the [facility]. Please see a copy of my visit note below.        History of Present Illness - Shon Sargent is a 57 year old male who presents with a 2 week history of feeling his hearing is down on the left ear. The hearing seems to worsen when he bends his head forward. He denies vertigo. He had some drainage from the left ear when sleeping. He was told by another doctor that he had a hole in the TM. He denies prior ear surgery or significant water exposure.    Past Medical History -   Patient Active Problem List   Diagnosis     Hypertension goal BP (blood pressure) < 140/90     Tremor     Gout     ED (erectile dysfunction)     Psoriasis     Esophageal reflux (GERD)     CARDIOVASCULAR SCREENING; LDL GOAL LESS THAN 130     Iron deficiency anemia       Current Medications -   Current Outpatient Prescriptions:      allopurinol (ZYLOPRIM) 300 MG tablet, Take 1 tablet (300 mg) by mouth daily, Disp: 90 tablet, Rfl: 3     lisinopril (PRINIVIL/ZESTRIL) 20 MG tablet, Take 1 tablet (20 mg) by mouth daily, Disp: 90 tablet, Rfl: 3     ofloxacin (FLOXIN) 0.3 % otic solution, Place 5 drops Into the left ear daily for 7 days, Disp: 10 mL, Rfl: 0     omeprazole (PRILOSEC) 20 MG CR capsule, Take 1 capsule (20 mg) by mouth daily, Disp: 30 capsule, Rfl: 3     prednisoLONE acetate (PRED FORTE) 1 % ophthalmic susp, Place 5 drops Into the left ear 2 times daily for 7 days, Disp: 4 mL, Rfl: 0     albuterol (PROAIR HFA/PROVENTIL HFA/VENTOLIN HFA) 108 (90 BASE) MCG/ACT Inhaler, Inhale 2 puffs into the lungs every 6 hours as needed for shortness of breath / dyspnea or wheezing (Patient not taking: Reported on 9/10/2018), Disp: 1 Inhaler, Rfl: 11     sildenafil (REVATIO) 20 MG tablet, Take as many pills as needed up to maximum of 5 pills at a time prior to intercourse.,  Disp: 50 tablet, Rfl: 3    Allergies -   Allergies   Allergen Reactions     Valdecoxib Hives       Social History -   Social History     Social History     Marital status:      Spouse name: N/A     Number of children: N/A     Years of education: N/A     Social History Main Topics     Smoking status: Never Smoker     Smokeless tobacco: Never Used     Alcohol use Yes      Comment: occ'l     Drug use: No     Sexual activity: Yes     Partners: Female     Other Topics Concern     Parent/Sibling W/ Cabg, Mi Or Angioplasty Before 65f 55m? No     Social History Narrative       Family History -   Family History   Problem Relation Age of Onset     Cancer Mother      lymphoma     Hypertension Father      Circulatory Father      GASTROINTESTINAL DISEASE Father      Lipids Father      HEART DISEASE Father      Alzheimer Disease Maternal Grandfather      Anesthesia Reaction Maternal Grandfather      Cardiovascular Paternal Grandfather      HEART DISEASE Paternal Grandfather      Asthma Son      allergy induced     Asthma Son      al;lergy induced       Review of Systems - As per HPI and PMHx, otherwise 7 system review of the head and neck negative. 10+ system review negative.    Physical Exam  /79 (BP Location: Right arm, Patient Position: Chair, Cuff Size: Adult Large)  Temp 98.4  F (36.9  C) (Oral)  Wt 101.6 kg (224 lb)  BMI 31.24 kg/m2  General - The patient is well nourished and well developed, and appears to have good nutritional status.  Alert and oriented to person and place, answers questions and cooperates with examination appropriately.   Head and Face - Normocephalic and atraumatic, with no gross asymmetry noted of the contour of the facial features.  The facial nerve is intact, with strong symmetric movements.  Voice and Breathing - The patient was breathing comfortably without the use of accessory muscles. There was no wheezing, stridor, or stertor.  The patients voice was clear and strong, and had  appropriate pitch and quality.  Ears - right ear canal is clear. Right TM intact without effusion. Left ear canal with fungal debris. Polypoid inflammation on the lateral surface of the TM with possible middle ear serous effusion. Able to autoinsufflate the left ear well. No perforation noted.  Eyes - Extraocular movements intact.  Sclera were not icteric or injected, conjunctiva were pink and moist.  Mouth - Examination of the oral cavity showed pink, healthy oral mucosa. No lesions or ulcerations noted.  The tongue was mobile and midline, and the dentition were in good condition.    Throat - The walls of the oropharynx were smooth, pink, moist, symmetric, and had no lesions or ulcerations.  The tonsillar pillars and soft palate were symmetric.  The uvula was midline on elevation.  Neck - Normal midline excursion of the laryngotracheal complex during swallowing.  Full range of motion on passive movement.  Palpation of the occipital, submental, submandibular, internal jugular chain, and supraclavicular nodes did not demonstrate any abnormal lymph nodes or masses.  The carotid pulse was palpable bilaterally.  Palpation of the thyroid was soft and smooth, with no nodules or goiter appreciated.  The trachea was mobile and midline.  Nose - External contour is symmetric, no gross deflection or scars.  Nasal mucosa is pink and moist with no abnormal mucus.  The septum was midline and non-obstructive, turbinates of normal size and position.  No polyps, masses, or purulence noted on examination.          Assessment - Shon Sargent is a 57 year old male with left fungal otitis, likely secondary to aural polyp on the malleus. I started prednisone drops to help calm down the polyp. I also recommended frequent autoinsufflate to help clear the middle ear fluid. Return in 2 weeks for recheck.       Dr. Arian Hayden MD  Otolaryngology  [facility]        Again, thank you for allowing me to participate in the care of  your patient.        Sincerely,        Arian Hayden MD

## 2018-09-26 ENCOUNTER — OFFICE VISIT (OUTPATIENT)
Dept: OTOLARYNGOLOGY | Facility: CLINIC | Age: 58
End: 2018-09-26
Payer: COMMERCIAL

## 2018-09-26 VITALS
TEMPERATURE: 97.7 F | DIASTOLIC BLOOD PRESSURE: 80 MMHG | WEIGHT: 224 LBS | HEART RATE: 70 BPM | SYSTOLIC BLOOD PRESSURE: 116 MMHG | BODY MASS INDEX: 31.24 KG/M2

## 2018-09-26 DIAGNOSIS — H60.62 CHRONIC NON-INFECTIVE OTITIS EXTERNA OF LEFT EAR, UNSPECIFIED TYPE: Primary | ICD-10-CM

## 2018-09-26 PROCEDURE — 99213 OFFICE O/P EST LOW 20 MIN: CPT | Performed by: OTOLARYNGOLOGY

## 2018-09-26 RX ORDER — CLOTRIMAZOLE 1 G/ML
1 SOLUTION TOPICAL 2 TIMES DAILY
Qty: 15 ML | Refills: 1 | Status: SHIPPED | OUTPATIENT
Start: 2018-09-26 | End: 2018-10-06

## 2018-09-26 NOTE — LETTER
9/26/2018         RE: Shon Sargent  [address]  [facility] [address]        Dear Colleague,    Thank you for referring your patient, Shon Sargent, to the [facility]. Please see a copy of my visit note below.    History of Present Illness - Shon Sargent is a 57 year old male who returns with left ear fullness and itching. I saw him 2 weeks ago and cleaned the left ear, and placed him on topical steroids. He felt better after 3 days so stopped taking the steroid. His ear then began to feel that it was filling up. It has itched the entire time, and this has been ongoing for years.    Past Medical History -   Patient Active Problem List   Diagnosis     Hypertension goal BP (blood pressure) < 140/90     Tremor     Gout     ED (erectile dysfunction)     Psoriasis     Esophageal reflux (GERD)     CARDIOVASCULAR SCREENING; LDL GOAL LESS THAN 130     Iron deficiency anemia       Current Medications -   Current Outpatient Prescriptions:      allopurinol (ZYLOPRIM) 300 MG tablet, Take 1 tablet (300 mg) by mouth daily, Disp: 90 tablet, Rfl: 3     clotrimazole (LOTRIMIN) 1 % solution, Apply 1 mL topically 2 times daily for 10 days To the left ear., Disp: 15 mL, Rfl: 1     lisinopril (PRINIVIL/ZESTRIL) 20 MG tablet, Take 1 tablet (20 mg) by mouth daily, Disp: 90 tablet, Rfl: 3     omeprazole (PRILOSEC) 20 MG CR capsule, Take 1 capsule (20 mg) by mouth daily, Disp: 30 capsule, Rfl: 3     albuterol (PROAIR HFA/PROVENTIL HFA/VENTOLIN HFA) 108 (90 BASE) MCG/ACT Inhaler, Inhale 2 puffs into the lungs every 6 hours as needed for shortness of breath / dyspnea or wheezing (Patient not taking: Reported on 9/10/2018), Disp: 1 Inhaler, Rfl: 11     sildenafil (REVATIO) 20 MG tablet, Take as many pills as needed up to maximum of 5 pills at a time prior to intercourse., Disp: 50 tablet, Rfl: 3    Allergies -   Allergies   Allergen Reactions     Valdecoxib Hives       Social History -   Social History     Social History      Marital status:      Spouse name: N/A     Number of children: N/A     Years of education: N/A     Social History Main Topics     Smoking status: Never Smoker     Smokeless tobacco: Never Used     Alcohol use Yes      Comment: occas     Drug use: No     Sexual activity: Yes     Partners: Female     Other Topics Concern     Parent/Sibling W/ Cabg, Mi Or Angioplasty Before 65f 55m? No     Social History Narrative       Family History -   Family History   Problem Relation Age of Onset     Cancer Mother      lymphoma     Hypertension Father      Circulatory Father      GASTROINTESTINAL DISEASE Father      Lipids Father      HEART DISEASE Father      Alzheimer Disease Maternal Grandfather      Anesthesia Reaction Maternal Grandfather      Cardiovascular Paternal Grandfather      HEART DISEASE Paternal Grandfather      Asthma Son      allergy induced     Asthma Son      al;lergy induced       Review of Systems - As per HPI and PMHx, otherwise 7 system review of the head and neck negative. 10+ system review negative.    Exam:  /80 (BP Location: Right arm, Patient Position: Chair, Cuff Size: Adult Large)  Pulse 70  Temp 97.7  F (36.5  C) (Oral)  Wt 101.6 kg (224 lb)  BMI 31.24 kg/m2  General - The patient is well nourished and well developed, and appears to have good nutritional status.  Alert and oriented to person and place, answers questions and cooperates with examination appropriately.   Head and Face - Normocephalic and atraumatic, with no gross asymmetry noted of the contour of the facial features.  The facial nerve is intact, with strong symmetric movements.  Eyes - Extraocular movements intact.  Sclera were not icteric or injected, conjunctiva were pink and moist.  Ears - left ear canal with white debris consistent with fungus. This was removed to reveal inflammed lateral surface of the TM.       A/P - Shon Sargent is a 57 year old male with ongoing inflammation of the lateral surface of the  left TM. I am not sure he took the steroid drops long enough to count this as a treatment failure, but given the persistent itching despite the steroid, I think it might be wise to try antifungal drops. I started him on Lotrimin. Return in 2 weeks.      Dr. Arian Hayden MD  Otolaryngology  [facility]      Again, thank you for allowing me to participate in the care of your patient.        Sincerely,        Arian Hayden MD

## 2018-09-26 NOTE — MR AVS SNAPSHOT
After Visit Summary   9/26/2018    Shon Sargent    MRN: [id number]           Patient Information     Date Of Birth          1960        Visit Information        Provider Department      9/26/2018 12:15 PM Arian Hayden MD [facility]        Today's Diagnoses     Chronic non-infective otitis externa of left ear, unspecified type    -  1      Care Instructions    Per physician's instructions            Follow-ups after your visit        Who to contact     If you have questions or need follow up information about today's clinic visit or your schedule please contact [facility] directly at [phone].  Normal or non-critical lab and imaging results will be communicated to you by MyChart, letter or phone within 4 business days after the clinic has received the results. If you do not hear from us within 7 days, please contact the clinic through MyChart or phone. If you have a critical or abnormal lab result, we will notify you by phone as soon as possible.  Submit refill requests through [facility] or call your pharmacy and they will forward the refill request to us. Please allow 3 business days for your refill to be completed.          Additional Information About Your Visit        Care EveryWhere ID     This is your Care EveryWhere ID. This could be used by other organizations to access your [facility] medical records  [id number]        Your Vitals Were     Pulse Temperature BMI (Body Mass Index)             70 97.7  F (36.5  C) (Oral) 31.24 kg/m2          Blood Pressure from Last 3 Encounters:   09/26/18 116/80   09/10/18 139/79   09/05/18 123/87    Weight from Last 3 Encounters:   09/26/18 101.6 kg (224 lb)   09/10/18 101.6 kg (224 lb)   09/05/18 101.6 kg (224 lb)              Today, you had the following     No orders found for display         Today's Medication Changes          These changes are accurate as of 9/26/18  2:13 PM.  If you have any questions, ask your  nurse or doctor.               Start taking these medicines.        Dose/Directions    clotrimazole 1 % solution   Commonly known as:  LOTRIMIN   Used for:  Chronic non-infective otitis externa of left ear, unspecified type   Started by:  Arian Hayden MD        Dose:  1 mL   Apply 1 mL topically 2 times daily for 10 days To the left ear.   Quantity:  15 mL   Refills:  1            Where to get your medicines      These medications were sent to [facility] [place] [address] [place] [address]  [address] [facility] [address]     Phone:  [phone]     clotrimazole 1 % solution                Primary Care Provider Office Phone # Fax #    Andrea Sandhu [phone] [address] [facility] [address]        Equal Access to Services     CHAPARRITA CORDON : Hadii aad ku hadasho Socharley, waaxda luqadaha, qaybta kaalmada adeegyada, gladys fortune . So [facility] [phone].    ATENCIÓN: Si habla español, tiene a daniel disposición servicios gratuitos de asistencia lingüística. Llame al [phone].    We comply with applicable federal civil rights laws and [place] laws. We do not discriminate on the basis of race, color, national origin, age, disability, sex, sexual orientation, or gender identity.            Thank you!     Thank you for choosing [facility]  for your care. Our goal is always to provide you with excellent care. Hearing back from our patients is one way we can continue to improve our services. Please take a few minutes to complete the written survey that you may receive in the mail after your visit with us. Thank you!             Your Updated Medication List - Protect others around you: Learn how to safely use, store and throw away your medicines at www.disposemymeds.org.          This list is accurate as of 9/26/18  2:13 PM.  Always use your most recent med list.                   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    Chronic non-infective otitis externa of left ear, unspecified type       lisinopril 20 MG tablet    PRINIVIL/ZESTRIL    90 tablet    Take 1 tablet (20 mg) by mouth daily    Essential hypertension, benign       omeprazole 20 MG CR capsule    priLOSEC    30 capsule    Take 1 capsule (20 mg) by mouth daily    Gastroesophageal reflux disease without esophagitis       sildenafil 20 MG tablet    REVATIO    50 tablet    Take as many pills as needed up to maximum of 5 pills at a time prior to intercourse.    Erectile dysfunction, unspecified erectile dysfunction type

## 2018-09-26 NOTE — NURSING NOTE
"Initial /80 (BP Location: Right arm, Patient Position: Chair, Cuff Size: Adult Large)  Pulse 70  Temp 97.7  F (36.5  C) (Oral)  Wt 101.6 kg (224 lb)  BMI 31.24 kg/m2 Estimated body mass index is 31.24 kg/(m^2) as calculated from the following:    Height as of 8/23/18: 1.803 m (5' 11\").    Weight as of this encounter: 101.6 kg (224 lb). .    Phoebe Chau LPN    "

## 2018-09-26 NOTE — PROGRESS NOTES
History of Present Illness - Shon Sargent is a 57 year old male who returns with left ear fullness and itching. I saw him 2 weeks ago and cleaned the left ear, and placed him on topical steroids. He felt better after 3 days so stopped taking the steroid. His ear then began to feel that it was filling up. It has itched the entire time, and this has been ongoing for years.    Past Medical History -   Patient Active Problem List   Diagnosis     Hypertension goal BP (blood pressure) < 140/90     Tremor     Gout     ED (erectile dysfunction)     Psoriasis     Esophageal reflux (GERD)     CARDIOVASCULAR SCREENING; LDL GOAL LESS THAN 130     Iron deficiency anemia       Current Medications -   Current Outpatient Prescriptions:      allopurinol (ZYLOPRIM) 300 MG tablet, Take 1 tablet (300 mg) by mouth daily, Disp: 90 tablet, Rfl: 3     clotrimazole (LOTRIMIN) 1 % solution, Apply 1 mL topically 2 times daily for 10 days To the left ear., Disp: 15 mL, Rfl: 1     lisinopril (PRINIVIL/ZESTRIL) 20 MG tablet, Take 1 tablet (20 mg) by mouth daily, Disp: 90 tablet, Rfl: 3     omeprazole (PRILOSEC) 20 MG CR capsule, Take 1 capsule (20 mg) by mouth daily, Disp: 30 capsule, Rfl: 3     albuterol (PROAIR HFA/PROVENTIL HFA/VENTOLIN HFA) 108 (90 BASE) MCG/ACT Inhaler, Inhale 2 puffs into the lungs every 6 hours as needed for shortness of breath / dyspnea or wheezing (Patient not taking: Reported on 9/10/2018), Disp: 1 Inhaler, Rfl: 11     sildenafil (REVATIO) 20 MG tablet, Take as many pills as needed up to maximum of 5 pills at a time prior to intercourse., Disp: 50 tablet, Rfl: 3    Allergies -   Allergies   Allergen Reactions     Valdecoxib Hives       Social History -   Social History     Social History     Marital status:      Spouse name: N/A     Number of children: N/A     Years of education: N/A     Social History Main Topics     Smoking status: Never Smoker     Smokeless tobacco: Never Used     Alcohol use Yes       Comment: occas     Drug use: No     Sexual activity: Yes     Partners: Female     Other Topics Concern     Parent/Sibling W/ Cabg, Mi Or Angioplasty Before 65f 55m? No     Social History Narrative       Family History -   Family History   Problem Relation Age of Onset     Cancer Mother      lymphoma     Hypertension Father      Circulatory Father      GASTROINTESTINAL DISEASE Father      Lipids Father      HEART DISEASE Father      Alzheimer Disease Maternal Grandfather      Anesthesia Reaction Maternal Grandfather      Cardiovascular Paternal Grandfather      HEART DISEASE Paternal Grandfather      Asthma Son      allergy induced     Asthma Son      al;lergy induced       Review of Systems - As per HPI and PMHx, otherwise 7 system review of the head and neck negative. 10+ system review negative.    Exam:  /80 (BP Location: Right arm, Patient Position: Chair, Cuff Size: Adult Large)  Pulse 70  Temp 97.7  F (36.5  C) (Oral)  Wt 101.6 kg (224 lb)  BMI 31.24 kg/m2  General - The patient is well nourished and well developed, and appears to have good nutritional status.  Alert and oriented to person and place, answers questions and cooperates with examination appropriately.   Head and Face - Normocephalic and atraumatic, with no gross asymmetry noted of the contour of the facial features.  The facial nerve is intact, with strong symmetric movements.  Eyes - Extraocular movements intact.  Sclera were not icteric or injected, conjunctiva were pink and moist.  Ears - left ear canal with white debris consistent with fungus. This was removed to reveal inflammed lateral surface of the TM.       A/P - Shon Sargent is a 57 year old male with ongoing inflammation of the lateral surface of the left TM. I am not sure he took the steroid drops long enough to count this as a treatment failure, but given the persistent itching despite the steroid, I think it might be wise to try antifungal drops. I started him on  Lotrimin. Return in 2 weeks.      Dr. Arian Hayden MD  Otolaryngology  [facility]

## 2018-10-29 ENCOUNTER — TRANSFERRED RECORDS (OUTPATIENT)
Dept: HEALTH INFORMATION MANAGEMENT | Facility: CLINIC | Age: 58
End: 2018-10-29

## 2018-11-08 ENCOUNTER — OFFICE VISIT (OUTPATIENT)
Dept: FAMILY MEDICINE | Facility: CLINIC | Age: 58
End: 2018-11-08

## 2018-11-08 VITALS
DIASTOLIC BLOOD PRESSURE: 80 MMHG | BODY MASS INDEX: 31.64 KG/M2 | SYSTOLIC BLOOD PRESSURE: 130 MMHG | TEMPERATURE: 97.1 F | HEART RATE: 70 BPM | RESPIRATION RATE: 18 BRPM | HEIGHT: 71 IN | WEIGHT: 226 LBS

## 2018-11-08 DIAGNOSIS — H61.92: ICD-10-CM

## 2018-11-08 DIAGNOSIS — A69.20 LYME DISEASE: Primary | ICD-10-CM

## 2018-11-08 PROCEDURE — 99213 OFFICE O/P EST LOW 20 MIN: CPT | Performed by: FAMILY MEDICINE

## 2018-11-08 ASSESSMENT — PAIN SCALES - GENERAL: PAINLEVEL: NO PAIN (0)

## 2018-11-08 NOTE — PATIENT INSTRUCTIONS
ASSESSMENT:   (A69.20) Lyme disease  (primary encounter diagnosis)  Comment: early lyme with ECM rash.  This should be completely treated with the 14 days of doxycycline.  No additional testing or treatment is needed.  Finish all the pills.   Plan: Recheck with additional problems or concerns.     (Y21.92) Disorder of left external ear  Comment: irritation.  No sign of infection at this time.   Plan: Try the prednisone ear drops again for a week,  Several drops in left ear twice daily  This can be used intermittently in the future if needed.

## 2018-11-08 NOTE — NURSING NOTE
"Chief Complaint   Patient presents with     Consult     Seen at [facility] diagnosed with lyme.     Ear Problem       Initial /80  Pulse 70  Temp 97.1  F (36.2  C) (Tympanic)  Resp 18  Ht 5' 11\" (1.803 m)  Wt 226 lb (102.5 kg)  BMI 31.52 kg/m2 Estimated body mass index is 31.52 kg/(m^2) as calculated from the following:    Height as of this encounter: 5' 11\" (1.803 m).    Weight as of this encounter: 226 lb (102.5 kg).    Patient presents to the clinic using     Health Maintenance that is potentially due pending provider review:          Is there anyone who you would like to be able to receive your results?   If yes have patient fill out CECILIO    "

## 2018-11-08 NOTE — MR AVS SNAPSHOT
After Visit Summary   11/8/2018    Shon Sargent    MRN: [id number]           Patient Information     Date Of Birth          1960        Visit Information        Provider Department      11/8/2018 10:40 AM Andrea Sandhu MD [facility]        Today's Diagnoses     Lyme disease    -  1    Disorder of left external ear          Care Instructions    ASSESSMENT:   (A69.20) Lyme disease  (primary encounter diagnosis)  Comment: early lyme with ECM rash.  This should be completely treated with the 14 days of doxycycline.  No additional testing or treatment is needed.  Finish all the pills.   Plan: Recheck with additional problems or concerns.     (H61.92) Disorder of left external ear  Comment: irritation.  No sign of infection at this time.   Plan: Try the prednisone ear drops again for a week,  Several drops in left ear twice daily  This can be used intermittently in the future if needed.           Follow-ups after your visit        Follow-up notes from your care team     Return if symptoms worsen or fail to improve.      Who to contact     If you have questions or need follow up information about today's clinic visit or your schedule please contact [facility] directly at [phone].  Normal or non-critical lab and imaging results will be communicated to you by [employer], letter or phone within 4 business days after the clinic has received the results. If you do not hear from us within 7 days, please contact the clinic through [employer] or phone. If you have a critical or abnormal lab result, we will notify you by phone as soon as possible.  Submit refill requests through [employer] or call your pharmacy and they will forward the refill request to us. Please allow 3 business days for your refill to be completed.          Additional Information About Your Visit        [employer] Information     [employer] lets you send messages to your doctor, view your test results,  "renew your prescriptions, schedule appointments and more. To sign up, go to [email or web address] . Click on \"Log in\" on the left side of the screen, which will take you to the Welcome page. Then click on \"Sign up Now\" on the right side of the page.     You will be asked to enter the access code listed below, as well as some personal information. Please follow the directions to create your username and password.     Your access code is: [email or web address]  Expires: 2019 10:40 AM     Your access code will  in 90 days. If you need help or a new code, please call your [place] clinic or [phone].        Care EveryWhere ID     This is your Care EveryWhere ID. This could be used by other organizations to access your [place] medical records  [id number]        Your Vitals Were     Pulse Temperature Respirations Height BMI (Body Mass Index)       70 97.1  F (36.2  C) (Tympanic) 18 5' 11\" (1.803 m) 31.52 kg/m2        Blood Pressure from Last 3 Encounters:   18 130/80   18 116/80   09/10/18 139/79    Weight from Last 3 Encounters:   18 226 lb (102.5 kg)   18 224 lb (101.6 kg)   09/10/18 224 lb (101.6 kg)              Today, you had the following     No orders found for display       Primary Care Provider Office Phone # Fax #    Andrea Sandhu [phone] [address] [place] [id number]        Equal Access to Services     [facility]: Hadii red samuels Socharley, waaxda luqadaha, qaybta kaalgladys layton . So [facility] [phone].    ATENCIÓN: Si habla español, tiene a daniel disposición servicios gratuitos de asistencia lingüística. Llame al [phone].    We comply with applicable federal civil rights laws and [place] laws. We do not discriminate on the basis of race, color, national origin, age, disability, sex, sexual orientation, or gender identity.            Thank you!     Thank you for choosing [facility]  for your care. Our goal is always to provide you with excellent care. Hearing back from our patients is one way we can continue to improve our services. Please take a few minutes to complete the written survey that you may receive in the mail after your visit with us. Thank you!             Your Updated Medication List - Protect others around you: Learn how to safely use, store and throw away your medicines at www.disposemymeds.org.          This list is accurate as of 11/8/18 11:39 AM.  Always use your most recent med list.                   Brand Name Dispense Instructions for use Diagnosis    albuterol 108 (90 Base) MCG/ACT inhaler    PROAIR HFA/PROVENTIL HFA/VENTOLIN HFA    1 Inhaler    Inhale 2 puffs into the lungs every 6 hours as needed for shortness of breath / dyspnea or wheezing    Cough       allopurinol 300 MG tablet    ZYLOPRIM    90 tablet    Take 1 tablet (300 mg) by mouth daily    Chronic gout involving toe without tophus, unspecified cause, unspecified laterality       lisinopril 20 MG tablet    PRINIVIL/ZESTRIL    90 tablet    Take 1 tablet (20 mg) by mouth daily    Essential hypertension, benign       omeprazole 20 MG CR capsule    priLOSEC    30 capsule    Take 1 capsule (20 mg) by mouth daily    Gastroesophageal reflux disease without esophagitis       sildenafil 20 MG tablet    REVATIO    50 tablet    Take as many pills as needed up to maximum of 5 pills at a time prior to intercourse.    Erectile dysfunction, unspecified erectile dysfunction type

## 2018-12-18 DIAGNOSIS — K21.9 GASTROESOPHAGEAL REFLUX DISEASE WITHOUT ESOPHAGITIS: ICD-10-CM

## 2018-12-18 NOTE — TELEPHONE ENCOUNTER
"Requested Prescriptions   Pending Prescriptions Disp Refills     omeprazole (PRILOSEC) 20 MG DR capsule [Pharmacy Med Name: OMEPRAZOLE 20MG CAP] 30 capsule 3     Sig: TAKE 1 CAPSULE BY MOUTH ONCE DAILY    PPI Protocol Passed - 12/18/2018  6:45 AM       Passed - Not on Clopidogrel (unless Pantoprazole ordered)       Passed - No diagnosis of osteoporosis on record       Passed - Recent (12 mo) or future (30 days) visit within the authorizing provider's specialty    Patient had office visit in the last 12 months or has a visit in the next 30 days with authorizing provider or within the authorizing provider's specialty.  See \"Patient Info\" tab in inbasket, or \"Choose Columns\" in Meds & Orders section of the refill encounter.      Last Written Prescription Date:  8/16/18  Last Fill Quantity: 30,  # refills: 3   Last office visit: 11/8/2018 with prescribing provider:     Future Office Visit:               Passed - Patient is age 18 or older          "

## 2019-01-18 DIAGNOSIS — M1A.9XX0 CHRONIC GOUT INVOLVING TOE WITHOUT TOPHUS, UNSPECIFIED CAUSE, UNSPECIFIED LATERALITY: ICD-10-CM

## 2019-01-18 DIAGNOSIS — I10 ESSENTIAL HYPERTENSION, BENIGN: ICD-10-CM

## 2019-01-18 RX ORDER — LISINOPRIL 20 MG/1
TABLET ORAL
Qty: 90 TABLET | Refills: 0 | Status: SHIPPED | OUTPATIENT
Start: 2019-01-18 | End: 2019-04-14

## 2019-01-18 RX ORDER — ALLOPURINOL 300 MG/1
TABLET ORAL
Qty: 90 TABLET | Refills: 0 | Status: SHIPPED | OUTPATIENT
Start: 2019-01-18 | End: 2019-04-14

## 2019-02-22 DIAGNOSIS — N52.9 ERECTILE DYSFUNCTION, UNSPECIFIED ERECTILE DYSFUNCTION TYPE: ICD-10-CM

## 2019-02-22 RX ORDER — SILDENAFIL CITRATE 20 MG/1
TABLET ORAL
Qty: 50 TABLET | Refills: 3 | Status: SHIPPED | OUTPATIENT
Start: 2019-02-22 | End: 2019-12-27

## 2019-02-22 NOTE — TELEPHONE ENCOUNTER
"Requested Prescriptions   Pending Prescriptions Disp Refills     sildenafil (REVATIO) 20 MG tablet [Pharmacy Med Name: SILDENAFIL 20MG TAB] 50 tablet 3     Sig: TAKE AS MANY TABLETS (A MAX OF 5 TABLETS) BY MOUTH AS NEEDED AT A TIME PRIOR TO INTERCOURSE    Erectile Dysfuction Protocol Passed - 2/22/2019  5:30 AM       Passed - Absence of nitrates on medication list       Passed - Absence of Alpha Blockers on Med list       Passed - Recent (12 mo) or future (30 days) visit within the authorizing provider's specialty    Patient had office visit in the last 12 months or has a visit in the next 30 days with authorizing provider or within the authorizing provider's specialty.  See \"Patient Info\" tab in inbasket, or \"Choose Columns\" in Meds & Orders section of the refill encounter.             Passed - Medication is active on med list       Passed - Patient is age 18 or older      sildenafil (REVATIO) 20 MG tablet  Last Written Prescription Date:  01/04/2018  Last Fill Quantity: 50 tablet,  # refills: 3   Last office visit: 11/8/2018 with prescribing provider:  LUKE Sandhu   Future Office Visit:      Lisandra Bejarano RT (R) (M)      "

## 2019-04-14 DIAGNOSIS — Z13.6 CARDIOVASCULAR SCREENING; LDL GOAL LESS THAN 130: Primary | ICD-10-CM

## 2019-04-14 DIAGNOSIS — M1A.9XX0 CHRONIC GOUT INVOLVING TOE WITHOUT TOPHUS, UNSPECIFIED CAUSE, UNSPECIFIED LATERALITY: ICD-10-CM

## 2019-04-14 DIAGNOSIS — I10 ESSENTIAL HYPERTENSION, BENIGN: ICD-10-CM

## 2019-04-15 RX ORDER — LISINOPRIL 20 MG/1
20 TABLET ORAL DAILY
Qty: 30 TABLET | Refills: 0 | Status: SHIPPED | OUTPATIENT
Start: 2019-04-15 | End: 2019-05-20

## 2019-04-15 RX ORDER — ALLOPURINOL 300 MG/1
300 TABLET ORAL DAILY
Qty: 30 TABLET | Refills: 0 | Status: SHIPPED | OUTPATIENT
Start: 2019-04-15 | End: 2019-05-20

## 2019-04-15 NOTE — TELEPHONE ENCOUNTER
"Requested Prescriptions   Pending Prescriptions Disp Refills     lisinopril (PRINIVIL/ZESTRIL) 20 MG tablet [Pharmacy Med Name: LISINOPRIL 20MG     TAB] 90 tablet 0     Sig: TAKE 1 TABLET BY MOUTH ONCE DAILY (NEED  APPOINTMENT  WITH  DOCTOR  FOR  FURTHER  REFILLS)       ACE Inhibitors (Including Combos) Protocol Failed - 4/14/2019  1:11 PM        Failed - Normal serum creatinine on file in past 12 months     Recent Labs   Lab Test 01/04/18  0957   CR 0.90             Failed - Normal serum potassium on file in past 12 months     Recent Labs   Lab Test 01/04/18  0957   POTASSIUM 4.3             Passed - Blood pressure under 140/90 in past 12 months     BP Readings from Last 3 Encounters:   11/08/18 130/80   09/26/18 116/80   09/10/18 139/79                 Passed - Recent (12 mo) or future (30 days) visit within the authorizing provider's specialty     Patient had office visit in the last 12 months or has a visit in the next 30 days with authorizing provider or within the authorizing provider's specialty.  See \"Patient Info\" tab in inbasket, or \"Choose Columns\" in Meds & Orders section of the refill encounter.              Passed - Medication is active on med list        Passed - Patient is age 18 or older        allopurinol (ZYLOPRIM) 300 MG tablet [Pharmacy Med Name: ALLOPURINOL 300MG   TAB] 90 tablet 0     Sig: TAKE 1 TABLET BY MOUTH ONCE DAILY (NEED  APPOINTMENT  WITH  DOCTOR  BEFORE  FURTHER  REFILLS)       Gout Agents Protocol Failed - 4/14/2019  1:11 PM        Failed - CBC on file in past 12 months     Recent Labs   Lab Test 01/04/18  0957   WBC 9.2   RBC 5.35   HGB 14.6   HCT 45.6                    Failed - ALT on file in past 12 months     Recent Labs   Lab Test 02/13/18  0800   ALT 64             Failed - Has Uric Acid on file in past 12 months and value is less than 6     Recent Labs   Lab Test 03/13/15  0848   URIC 4.5     If level is 6mg/dL or greater, ok to refill one time and refer to provider. " "          Failed - Normal serum creatinine on file in the past 12 months     Recent Labs   Lab Test 01/04/18  0957   CR 0.90             Passed - Recent (12 mo) or future (30 days) visit within the authorizing provider's specialty     Patient had office visit in the last 12 months or has a visit in the next 30 days with authorizing provider or within the authorizing provider's specialty.  See \"Patient Info\" tab in inbasket, or \"Choose Columns\" in Meds & Orders section of the refill encounter.              Passed - Medication is active on med list        Passed - Patient is age 18 or older        lisinopril (PRINIVIL/ZESTRIL) 20 MG tablet  Last Written Prescription Date:  01/18/2019  Last Fill Quantity: 90 tablet,  # refills: 0   Last office visit: 11/8/2018 with prescribing provider:  LUKE Sandhu   Future Office Visit:      allopurinol (ZYLOPRIM) 300 MG tablet  Last Written Prescription Date:  01/18/19  Last Fill Quantity: 90 tablet,  # refills: 0   Last office visit: 11/8/2018 with prescribing provider:   LUKE Sandhu   Future Office Visit:     Lisandra Bejarano RT (R) (M)       "

## 2019-05-20 DIAGNOSIS — I10 ESSENTIAL HYPERTENSION, BENIGN: ICD-10-CM

## 2019-05-20 DIAGNOSIS — M1A.9XX0 CHRONIC GOUT INVOLVING TOE WITHOUT TOPHUS, UNSPECIFIED CAUSE, UNSPECIFIED LATERALITY: ICD-10-CM

## 2019-05-20 RX ORDER — ALLOPURINOL 300 MG/1
TABLET ORAL
Qty: 30 TABLET | Refills: 0 | Status: SHIPPED | OUTPATIENT
Start: 2019-05-20 | End: 2019-06-20

## 2019-05-20 RX ORDER — LISINOPRIL 20 MG/1
TABLET ORAL
Qty: 30 TABLET | Refills: 0 | Status: SHIPPED | OUTPATIENT
Start: 2019-05-20 | End: 2019-06-20

## 2019-05-20 NOTE — TELEPHONE ENCOUNTER
"Requested Prescriptions   Pending Prescriptions Disp Refills     lisinopril (PRINIVIL/ZESTRIL) 20 MG tablet [Pharmacy Med Name: LISINOPRIL 20MG     TAB] 30 tablet 0     Sig: TAKE 1 TABLET BY MOUTH ONCE DAILY DUE  FOR  APPOINTMENT  AND  LABS  APRIL 2019  NO  FURTHER  REFILLS       ACE Inhibitors (Including Combos) Protocol Failed - 5/20/2019  8:53 AM        Failed - Normal serum creatinine on file in past 12 months     Recent Labs   Lab Test 01/04/18  0957   CR 0.90             Failed - Normal serum potassium on file in past 12 months     Recent Labs   Lab Test 01/04/18  0957   POTASSIUM 4.3             Passed - Blood pressure under 140/90 in past 12 months     BP Readings from Last 3 Encounters:   11/08/18 130/80   09/26/18 116/80   09/10/18 139/79                 Passed - Recent (12 mo) or future (30 days) visit within the authorizing provider's specialty     Patient had office visit in the last 12 months or has a visit in the next 30 days with authorizing provider or within the authorizing provider's specialty.  See \"Patient Info\" tab in inbasket, or \"Choose Columns\" in Meds & Orders section of the refill encounter.     Last Written Prescription Date:  4/15/19  Last Fill Quantity: 30,  # refills: 0   Last office visit: 11/8/2018 with prescribing provider:     Future Office Visit:                Passed - Medication is active on med list        Passed - Patient is age 18 or older        allopurinol (ZYLOPRIM) 300 MG tablet [Pharmacy Med Name: ALLOPURINOL 300MG   TAB] 30 tablet 0     Sig: TAKE 1 TABLET BY MOUTH ONCE DAILY DUE  FOR  APPOINTMENT  AND  LABS  APRIL 2019.  NO  FURTHER  REFILLS       Gout Agents Protocol Failed - 5/20/2019  8:53 AM        Failed - CBC on file in past 12 months     Recent Labs   Lab Test 01/04/18  0957   WBC 9.2   RBC 5.35   HGB 14.6   HCT 45.6                    Failed - ALT on file in past 12 months     Recent Labs   Lab Test 02/13/18  0800   ALT 64             Failed - Has Uric " "Acid on file in past 12 months and value is less than 6     Recent Labs   Lab Test 03/13/15  0848   URIC 4.5     If level is 6mg/dL or greater, ok to refill one time and refer to provider.           Failed - Normal serum creatinine on file in the past 12 months     Recent Labs   Lab Test 01/04/18  0957   CR 0.90             Passed - Recent (12 mo) or future (30 days) visit within the authorizing provider's specialty     Patient had office visit in the last 12 months or has a visit in the next 30 days with authorizing provider or within the authorizing provider's specialty.  See \"Patient Info\" tab in inbasket, or \"Choose Columns\" in Meds & Orders section of the refill encounter.      Last Written Prescription Date:  4/15/19  Last Fill Quantity: 30,  # refills: 0   Last office visit: 11/8/2018 with prescribing provider:     Future Office Visit:              Passed - Medication is active on med list        Passed - Patient is age 18 or older          "

## 2019-05-20 NOTE — TELEPHONE ENCOUNTER
Routing refill request to provider for review/approval because:  Mary Beth given x1 and patient did not follow up, please advise  Labs not current:  See below. Has future orders in place.   When do you want pt to be seen in office?    Tamar OLVERA RN

## 2019-06-11 ENCOUNTER — HOSPITAL ENCOUNTER (EMERGENCY)
Facility: CLINIC | Age: 59
Discharge: HOME | End: 2019-06-11
Attending: PHYSICIAN ASSISTANT | Admitting: PHYSICIAN ASSISTANT
Payer: COMMERCIAL

## 2019-06-11 ENCOUNTER — OFFICE VISIT (OUTPATIENT)
Dept: FAMILY MEDICINE | Facility: CLINIC | Age: 59
End: 2019-06-11
Payer: COMMERCIAL

## 2019-06-11 VITALS
DIASTOLIC BLOOD PRESSURE: 84 MMHG | WEIGHT: 233 LBS | BODY MASS INDEX: 33.36 KG/M2 | RESPIRATION RATE: 18 BRPM | SYSTOLIC BLOOD PRESSURE: 121 MMHG | OXYGEN SATURATION: 95 % | TEMPERATURE: 98.1 F | HEIGHT: 70 IN

## 2019-06-11 VITALS
DIASTOLIC BLOOD PRESSURE: 86 MMHG | WEIGHT: 233.3 LBS | TEMPERATURE: 97.8 F | HEART RATE: 73 BPM | BODY MASS INDEX: 32.66 KG/M2 | RESPIRATION RATE: 20 BRPM | SYSTOLIC BLOOD PRESSURE: 120 MMHG | OXYGEN SATURATION: 97 % | HEIGHT: 71 IN

## 2019-06-11 DIAGNOSIS — R10.11 ABDOMINAL PAIN, RIGHT UPPER QUADRANT: ICD-10-CM

## 2019-06-11 DIAGNOSIS — R10.9 RIGHT FLANK PAIN: ICD-10-CM

## 2019-06-11 DIAGNOSIS — R10.11 RUQ ABDOMINAL PAIN: Primary | ICD-10-CM

## 2019-06-11 LAB
ALBUMIN SERPL-MCNC: 4 G/DL (ref 3.4–5)
ALBUMIN UR-MCNC: NEGATIVE MG/DL
ALP SERPL-CCNC: 89 U/L (ref 40–150)
ALT SERPL W P-5'-P-CCNC: 22 U/L (ref 0–70)
ANION GAP SERPL CALCULATED.3IONS-SCNC: 7 MMOL/L (ref 3–14)
APPEARANCE UR: CLEAR
AST SERPL W P-5'-P-CCNC: 15 U/L (ref 0–45)
BASOPHILS # BLD AUTO: 0 10E9/L (ref 0–0.2)
BASOPHILS NFR BLD AUTO: 0.4 %
BILIRUB SERPL-MCNC: 1 MG/DL (ref 0.2–1.3)
BILIRUB UR QL STRIP: NEGATIVE
BUN SERPL-MCNC: 16 MG/DL (ref 7–30)
CALCIUM SERPL-MCNC: 9.1 MG/DL (ref 8.5–10.1)
CHLORIDE SERPL-SCNC: 109 MMOL/L (ref 94–109)
CO2 SERPL-SCNC: 26 MMOL/L (ref 20–32)
COLOR UR AUTO: YELLOW
CREAT SERPL-MCNC: 0.85 MG/DL (ref 0.66–1.25)
D DIMER PPP FEU-MCNC: <0.3 UG/ML FEU (ref 0–0.5)
DIFFERENTIAL METHOD BLD: NORMAL
EOSINOPHIL # BLD AUTO: 0.1 10E9/L (ref 0–0.7)
EOSINOPHIL NFR BLD AUTO: 1.9 %
ERYTHROCYTE [DISTWIDTH] IN BLOOD BY AUTOMATED COUNT: 12.9 % (ref 10–15)
GFR SERPL CREATININE-BSD FRML MDRD: >90 ML/MIN/{1.73_M2}
GLUCOSE SERPL-MCNC: 92 MG/DL (ref 70–99)
GLUCOSE UR STRIP-MCNC: NEGATIVE MG/DL
HCT VFR BLD AUTO: 45.9 % (ref 40–53)
HGB BLD-MCNC: 15.2 G/DL (ref 13.3–17.7)
HGB UR QL STRIP: ABNORMAL
IMM GRANULOCYTES # BLD: 0 10E9/L (ref 0–0.4)
IMM GRANULOCYTES NFR BLD: 0.3 %
KETONES UR STRIP-MCNC: NEGATIVE MG/DL
LEUKOCYTE ESTERASE UR QL STRIP: NEGATIVE
LIPASE SERPL-CCNC: 109 U/L (ref 73–393)
LYMPHOCYTES # BLD AUTO: 1.8 10E9/L (ref 0.8–5.3)
LYMPHOCYTES NFR BLD AUTO: 24.1 %
MCH RBC QN AUTO: 29.2 PG (ref 26.5–33)
MCHC RBC AUTO-ENTMCNC: 33.1 G/DL (ref 31.5–36.5)
MCV RBC AUTO: 88 FL (ref 78–100)
MONOCYTES # BLD AUTO: 0.7 10E9/L (ref 0–1.3)
MONOCYTES NFR BLD AUTO: 9 %
MUCOUS THREADS #/AREA URNS LPF: PRESENT /LPF
NEUTROPHILS # BLD AUTO: 4.7 10E9/L (ref 1.6–8.3)
NEUTROPHILS NFR BLD AUTO: 64.3 %
NITRATE UR QL: NEGATIVE
NRBC # BLD AUTO: 0 10*3/UL
NRBC BLD AUTO-RTO: 0 /100
PH UR STRIP: 5 PH (ref 5–7)
PLATELET # BLD AUTO: 213 10E9/L (ref 150–450)
POTASSIUM SERPL-SCNC: 3.9 MMOL/L (ref 3.4–5.3)
PROT SERPL-MCNC: 7.5 G/DL (ref 6.8–8.8)
RBC # BLD AUTO: 5.2 10E12/L (ref 4.4–5.9)
RBC #/AREA URNS AUTO: 0 /HPF (ref 0–2)
SODIUM SERPL-SCNC: 142 MMOL/L (ref 133–144)
SOURCE: ABNORMAL
SP GR UR STRIP: 1.01 (ref 1–1.03)
UROBILINOGEN UR STRIP-MCNC: 0 MG/DL (ref 0–2)
WBC # BLD AUTO: 7.3 10E9/L (ref 4–11)
WBC #/AREA URNS AUTO: <1 /HPF (ref 0–5)

## 2019-06-11 PROCEDURE — 99283 EMERGENCY DEPT VISIT LOW MDM: CPT | Performed by: PHYSICIAN ASSISTANT

## 2019-06-11 PROCEDURE — 80053 COMPREHEN METABOLIC PANEL: CPT | Performed by: PHYSICIAN ASSISTANT

## 2019-06-11 PROCEDURE — 99215 OFFICE O/P EST HI 40 MIN: CPT | Performed by: NURSE PRACTITIONER

## 2019-06-11 PROCEDURE — 81001 URINALYSIS AUTO W/SCOPE: CPT | Performed by: PHYSICIAN ASSISTANT

## 2019-06-11 PROCEDURE — 99284 EMERGENCY DEPT VISIT MOD MDM: CPT | Mod: Z6 | Performed by: PHYSICIAN ASSISTANT

## 2019-06-11 PROCEDURE — 85025 COMPLETE CBC W/AUTO DIFF WBC: CPT | Performed by: PHYSICIAN ASSISTANT

## 2019-06-11 PROCEDURE — 83690 ASSAY OF LIPASE: CPT | Performed by: PHYSICIAN ASSISTANT

## 2019-06-11 PROCEDURE — 85379 FIBRIN DEGRADATION QUANT: CPT | Performed by: PHYSICIAN ASSISTANT

## 2019-06-11 ASSESSMENT — ENCOUNTER SYMPTOMS
LIGHT-HEADEDNESS: 0
ACTIVITY CHANGE: 0
WEAKNESS: 0
DIZZINESS: 0
HEMATURIA: 0
ABDOMINAL PAIN: 1
BLOOD IN STOOL: 0
ABDOMINAL DISTENTION: 1
NUMBNESS: 0
EYE DISCHARGE: 0
SHORTNESS OF BREATH: 0
FREQUENCY: 1
BACK PAIN: 1
CONSTIPATION: 0
WOUND: 0
WHEEZING: 0
CHILLS: 0
DYSURIA: 0
EYE REDNESS: 0
HEADACHES: 0
COUGH: 0
COLOR CHANGE: 0
FEVER: 0
SORE THROAT: 0
NAUSEA: 0
VOMITING: 0
PALPITATIONS: 0
DIARRHEA: 1
FATIGUE: 0
EYE ITCHING: 0

## 2019-06-11 ASSESSMENT — MIFFLIN-ST. JEOR
SCORE: 1888.13
SCORE: 1897.43

## 2019-06-11 NOTE — PROGRESS NOTES
Subjective     Shon Sargent is a 58 year old male who presents to clinic today for the following health issues:    HPI   Abdominal Pain      Duration: Sunday evening     Description (location/character/radiation): URQ that radiates into right flank        Associated flank pain: yes- right     Intensity:  mild, severe depending on the time of day and if he eats something     Accompanying signs and symptoms:        Fever/Chills: no        Gas/Bloating: YES- constant and burping like crazy        Nausea/vomitting: no        Diarrhea: YES       Dysuria or Hematuria: no     History (previous similar pain/trauma/previous testing): none     Precipitating or alleviating factors:       Pain worse with eating/BM/urination: eating        Pain relieved by BM: no     Therapies tried and outcome: drinking water and trying to sleep     LMP:  not applicable    When he drinks water this pain from RUQ to R flank worsens. It will not let up.  He did eat a couple of nights ago and could not sleep at all. States years ago when he was placed on Allopurinol for gout he was told to drink enough water to prevent kidney stones. He states this has never happened. He has not had any gallbladder problems but states his mother had a lot of gallbladder issues. He has not had anything to eat since Sunday night. Constant burping. No nausea or vomiting. No pain with urination. Is having some diarrhea.    Patient Active Problem List   Diagnosis     Hypertension goal BP (blood pressure) < 140/90     Tremor     Gout     ED (erectile dysfunction)     Psoriasis     Esophageal reflux (GERD)     CARDIOVASCULAR SCREENING; LDL GOAL LESS THAN 130     Iron deficiency anemia     Past Surgical History:   Procedure Laterality Date     COLONOSCOPY N/A 10/8/2015    Procedure: COLONOSCOPY;  Surgeon: Rolando Ayers MD;  Location: [place] [facility]     ESOPHAGOSCOPY, GASTROSCOPY, DUODENOSCOPY (EGD), COMBINED N/A 11/3/2014    Procedure: COMBINED ESOPHAGOSCOPY, GASTROSCOPY,  DUODENOSCOPY (EGD);  Surgeon: Rolando Ayers MD;  Location: [facility]     ESOPHAGOSCOPY, GASTROSCOPY, DUODENOSCOPY (EGD), COMBINED N/A 10/8/2015    Procedure: COMBINED ESOPHAGOSCOPY, GASTROSCOPY, DUODENOSCOPY (EGD), BIOPSY SINGLE OR MULTIPLE;  Surgeon: Rolando Ayers MD;  Location: [facility] [facility]     HERNIA REPAIR  2007    umbilical       Social History     Tobacco Use     Smoking status: Never Smoker     Smokeless tobacco: Never Used   Substance Use Topics     Alcohol use: Yes     Comment: occas     Family History   Problem Relation Age of Onset     Cancer Mother         lymphoma     Hypertension Father      Circulatory Father      Gastrointestinal Disease Father      Lipids Father      Heart Disease Father      Alzheimer Disease Maternal Grandfather      Anesthesia Reaction Maternal Grandfather      Cardiovascular Paternal Grandfather      Heart Disease Paternal Grandfather      Asthma Son         allergy induced     Asthma Son         al;lergy induced         Current Outpatient Medications   Medication Sig Dispense Refill     allopurinol (ZYLOPRIM) 300 MG tablet TAKE 1 TABLET BY MOUTH ONCE DAILY DUE  FOR  APPOINTMENT  AND  LABS  APRIL 2019.  NO  FURTHER  REFILLS 30 tablet 0     lisinopril (PRINIVIL/ZESTRIL) 20 MG tablet TAKE 1 TABLET BY MOUTH ONCE DAILY DUE  FOR  APPOINTMENT  AND  LABS  APRIL 2019  NO  FURTHER  REFILLS 30 tablet 0     omeprazole (PRILOSEC) 20 MG DR capsule TAKE 1 CAPSULE BY MOUTH ONCE DAILY 90 capsule 3     albuterol (PROAIR HFA/PROVENTIL HFA/VENTOLIN HFA) 108 (90 BASE) MCG/ACT Inhaler Inhale 2 puffs into the lungs every 6 hours as needed for shortness of breath / dyspnea or wheezing (Patient not taking: Reported on 6/11/2019) 1 Inhaler 11     sildenafil (REVATIO) 20 MG tablet TAKE AS MANY TABLETS (A MAX OF 5 TABLETS) BY MOUTH AS NEEDED AT A TIME PRIOR TO INTERCOURSE (Patient not taking: Reported on 6/11/2019) 50 tablet 3     Allergies   Allergen Reactions     Valdecoxib Hives         Reviewed and updated  "as needed this visit by Provider  Tobacco  Allergies  Meds  Problems  Med Hx  Surg Hx  Fam Hx         Review of Systems   ROS COMP: Constitutional, HEENT, cardiovascular, pulmonary, GI, , musculoskeletal, neuro, skin, endocrine and psych systems are negative, except as otherwise noted.      Objective    /86   Pulse 73   Temp 97.8  F (36.6  C) (Tympanic)   Resp 20   Ht 1.791 m (5' 10.5\")   Wt 105.8 kg (233 lb 4.8 oz)   SpO2 97%   BMI 33.00 kg/m    Body mass index is 33 kg/m .  Physical Exam   GENERAL: healthy, alert and in moderate distress with RUQ pain and R flank pain.   EYES: Eyes grossly normal to inspection, PERRL and conjunctivae and sclerae normal  HENT: ear canals and TM's normal, nose and mouth without ulcers or lesions  NECK: no adenopathy, supple with full ROM  RESP: lungs clear to auscultation - no rales, rhonchi or wheezes  CV: regular rate and rhythm, normal S1 S2, no S3 or S4, no murmur, click or rub, no peripheral edema  ABDOMEN: soft, right upper quadrant tenderness, mild right CVA tenderness, no hepatosplenomegaly, no masses and bowel sounds normal  MS: no gross musculoskeletal defects noted, no edema  No rash    Diagnostic Test Results:  Labs reviewed in Epic  No results found for this or any previous visit (from the past 24 hour(s)).        Assessment & Plan   Problem List Items Addressed This Visit     None      Visit Diagnoses     RUQ abdominal pain    -  Primary    Right flank pain           Pt is not sure if right flank pain radiates to front or if RUQ pain radiates to R flank area.  Aurea, charge RN in [place], accepts patient.      Patient Instructions   Go directly to the [place] ER for further evaluation. They are expecting you.    Return for To ER.    TEO Qureshi CNP  [facility]      "

## 2019-06-11 NOTE — PATIENT INSTRUCTIONS
Go directly to the [facility] for further evaluation. They are expecting you.

## 2019-06-11 NOTE — ED AVS SNAPSHOT
[facility] Emergency Department  [address] [address]  Phone:  [phone]  Fax:  [phone]                                    Shon Sargent   MRN: [id number]    Department:  [facility] Emergency Department   Date of Visit:  6/11/2019           After Visit Summary Signature Page    I have received my discharge instructions, and my questions have been answered. I have discussed any challenges I see with this plan with the nurse or doctor.    ..........................................................................................................................................  Patient/Patient Representative Signature      ..........................................................................................................................................  Patient Representative Print Name and Relationship to Patient    ..................................................               ................................................  Date                                   Time    ..........................................................................................................................................  Reviewed by Signature/Title    ...................................................              ..............................................  Date                                               Time          22EPIC Rev 08/18

## 2019-06-11 NOTE — ED PROVIDER NOTES
History     Chief Complaint   Patient presents with     Abdominal Pain     2 days of RUQ abd pain, worse anfter eating and drinking. coming to ED for eval from [facility].     HPI  Shon Sargent is a 58 year old male who presents to the  emergency department at recommendation of clinic provider for concerns over right upper quadrant abdominal pain which is been present for the last 2 days.  Patient describes pain as a constant dull ache however does become intermittently more sharp, severe when he attempts to eat or drink anything, pain was most severe after he had dinner last night which consisted of grilled chicken and rice.  He has not eaten anything today.  He complains of increased eructation and has had some loose stools up to two times daily, however again none today and possible increased urinary frequency.   Pain radiates to his back on the right side.  He denies any recent fever, chills, myalgias, cough, chest pain, shortness of breath, wheezing, palpitations, dysuria, hematuria, melena, hematochezia, lower extremity swelling.  He denies any prior history of similar symptoms previously.  He has had a prior history of GI bleed approximately 2 years ago however states that he had normal colonoscopy, endoscopy at that time per his report, however Epic records indicate in 2015 he had one nonbleeding cratered gastric ulcer with adherent clot in the lesser curvature of the stomach that measured 2 mm and colonoscopy was significant for diverticulosis.  He denies any prior history of pertinent intra-abdominal surgeries.  He states infrequent alcohol use, once monthly.  He does report frequent NSAID use, ibuprofen 400 mg 1-2 times daily for the last several weeks.  He has a non-smoker.    Allergies:  Allergies   Allergen Reactions     Valdecoxib Hives     Problem List:    Patient Active Problem List    Diagnosis Date Noted     Iron deficiency anemia 03/18/2015     Priority: Medium     CARDIOVASCULAR  SCREENING; LDL GOAL LESS THAN 130 03/13/2015     Priority: Medium     Esophageal reflux (GERD) 10/27/2014     Priority: Medium     Hypertension goal BP (blood pressure) < 140/90 10/20/2014     Priority: Medium     Tremor 10/20/2014     Priority: Medium     10/20/2014:He has been on a beta-blocker for this-propranolol.  It helped blood pressure and tremor but made him tired.   Strong family history tremor.        Gout 10/20/2014     Priority: Medium     10/20/2014:HE has done well with allopurinol.        ED (erectile dysfunction) 10/20/2014     Priority: Medium     Psoriasis 10/20/2014     Priority: Medium      Past Medical History:    Past Medical History:   Diagnosis Date     Hypertension        Past Surgical History:    Past Surgical History:   Procedure Laterality Date     COLONOSCOPY N/A 10/8/2015    Procedure: COLONOSCOPY;  Surgeon: Rolando Ayers MD;  Location: [facility]     ESOPHAGOSCOPY, GASTROSCOPY, DUODENOSCOPY (EGD), COMBINED N/A 11/3/2014    Procedure: COMBINED ESOPHAGOSCOPY, GASTROSCOPY, DUODENOSCOPY (EGD);  Surgeon: Rolando Ayers MD;  Location: [facility]     ESOPHAGOSCOPY, GASTROSCOPY, DUODENOSCOPY (EGD), COMBINED N/A 10/8/2015    Procedure: COMBINED ESOPHAGOSCOPY, GASTROSCOPY, DUODENOSCOPY (EGD), BIOPSY SINGLE OR MULTIPLE;  Surgeon: Rolando Ayers MD;  Location: [facility]     HERNIA REPAIR  2007    umbilical     Family History:    Family History   Problem Relation Age of Onset     Cancer Mother         lymphoma     Hypertension Father      Circulatory Father      Gastrointestinal Disease Father      Lipids Father      Heart Disease Father      Alzheimer Disease Maternal Grandfather      Anesthesia Reaction Maternal Grandfather      Cardiovascular Paternal Grandfather      Heart Disease Paternal Grandfather      Asthma Son         allergy induced     Asthma Son         al;lergy induced     Social History:  Marital Status:   [2]  Social History     Tobacco Use     Smoking status: Never Smoker     Smokeless  "tobacco: Never Used   Substance Use Topics     Alcohol use: Yes     Comment: occas     Drug use: No      Medications:      albuterol (PROAIR HFA/PROVENTIL HFA/VENTOLIN HFA) 108 (90 BASE) MCG/ACT Inhaler   allopurinol (ZYLOPRIM) 300 MG tablet   lisinopril (PRINIVIL/ZESTRIL) 20 MG tablet   omeprazole (PRILOSEC) 20 MG DR capsule   sildenafil (REVATIO) 20 MG tablet     Review of Systems   Constitutional: Negative for activity change, chills, fatigue and fever.   HENT: Negative for congestion, ear pain and sore throat.    Eyes: Negative for discharge, redness and itching.   Respiratory: Negative for cough, shortness of breath and wheezing.    Cardiovascular: Negative for chest pain, palpitations and leg swelling.   Gastrointestinal: Positive for abdominal distention, abdominal pain and diarrhea. Negative for blood in stool, constipation, nausea and vomiting.   Genitourinary: Positive for frequency. Negative for dysuria and hematuria.   Musculoskeletal: Positive for back pain.   Skin: Negative for color change, rash and wound.   Neurological: Negative for dizziness, weakness, light-headedness, numbness and headaches.   All other systems reviewed and are negative.    Physical Exam   BP: 121/84  Heart Rate: 65  Temp: 98.1  F (36.7  C)  Resp: 18  Height: 177.8 cm (5' 10\")  Weight: 105.7 kg (233 lb)  SpO2: 95 %    Physical Exam   Constitutional: He appears well-developed and well-nourished. No distress.   HENT:   Head: Normocephalic and atraumatic.   Mouth/Throat: Oropharynx is clear and moist.   Eyes: Pupils are equal, round, and reactive to light. Conjunctivae and EOM are normal. Right eye exhibits no discharge. Left eye exhibits no discharge.   Cardiovascular: Normal rate, regular rhythm and normal heart sounds. Exam reveals no gallop and no friction rub.   No murmur heard.  Pulmonary/Chest: Effort normal and breath sounds normal. No respiratory distress. He has no wheezes. He has no rales.   Abdominal: He exhibits no " distension. There is no tenderness. There is no rebound, no guarding and negative Dunaway's sign.     ED Course        Procedures        Critical Care time:  none        Results for orders placed or performed during the hospital encounter of 06/11/19 (from the past 24 hour(s))   CBC with platelets differential   Result Value Ref Range    WBC 7.3 4.0 - 11.0 10e9/L    RBC Count 5.20 4.4 - 5.9 10e12/L    Hemoglobin 15.2 13.3 - 17.7 g/dL    Hematocrit 45.9 40.0 - 53.0 %    MCV 88 78 - 100 fl    MCH 29.2 26.5 - 33.0 pg    MCHC 33.1 31.5 - 36.5 g/dL    RDW 12.9 10.0 - 15.0 %    Platelet Count 213 150 - 450 10e9/L    Diff Method Automated Method     % Neutrophils 64.3 %    % Lymphocytes 24.1 %    % Monocytes 9.0 %    % Eosinophils 1.9 %    % Basophils 0.4 %    % Immature Granulocytes 0.3 %    Nucleated RBCs 0 0 /100    Absolute Neutrophil 4.7 1.6 - 8.3 10e9/L    Absolute Lymphocytes 1.8 0.8 - 5.3 10e9/L    Absolute Monocytes 0.7 0.0 - 1.3 10e9/L    Absolute Eosinophils 0.1 0.0 - 0.7 10e9/L    Absolute Basophils 0.0 0.0 - 0.2 10e9/L    Abs Immature Granulocytes 0.0 0 - 0.4 10e9/L    Absolute Nucleated RBC 0.0    Comprehensive metabolic panel   Result Value Ref Range    Sodium 142 133 - 144 mmol/L    Potassium 3.9 3.4 - 5.3 mmol/L    Chloride 109 94 - 109 mmol/L    Carbon Dioxide 26 20 - 32 mmol/L    Anion Gap 7 3 - 14 mmol/L    Glucose 92 70 - 99 mg/dL    Urea Nitrogen 16 7 - 30 mg/dL    Creatinine 0.85 0.66 - 1.25 mg/dL    GFR Estimate >90 >60 mL/min/[1.73_m2]    GFR Estimate If Black >90 >60 mL/min/[1.73_m2]    Calcium 9.1 8.5 - 10.1 mg/dL    Bilirubin Total 1.0 0.2 - 1.3 mg/dL    Albumin 4.0 3.4 - 5.0 g/dL    Protein Total 7.5 6.8 - 8.8 g/dL    Alkaline Phosphatase 89 40 - 150 U/L    ALT 22 0 - 70 U/L    AST 15 0 - 45 U/L   Lipase   Result Value Ref Range    Lipase 109 73 - 393 U/L   D dimer quantitative   Result Value Ref Range    D Dimer <0.3 0.0 - 0.50 ug/ml FEU   UA with Microscopic   Result Value Ref Range    Color  Urine Yellow     Appearance Urine Clear     Glucose Urine Negative NEG^Negative mg/dL    Bilirubin Urine Negative NEG^Negative    Ketones Urine Negative NEG^Negative mg/dL    Specific Gravity Urine 1.015 1.003 - 1.035    Blood Urine Small (A) NEG^Negative    pH Urine 5.0 5.0 - 7.0 pH    Protein Albumin Urine Negative NEG^Negative mg/dL    Urobilinogen mg/dL 0.0 0.0 - 2.0 mg/dL    Nitrite Urine Negative NEG^Negative    Leukocyte Esterase Urine Negative NEG^Negative    Source Midstream Urine     WBC Urine <1 0 - 5 /HPF    RBC Urine 0 0 - 2 /HPF    Mucous Urine Present (A) NEG^Negative /LPF       Medications - No data to display    Assessments & Plan (with Medical Decision Making)     I have reviewed the nursing notes.    I have reviewed the findings, diagnosis, plan and need for follow up with the patient.          Medication List      There are no discharge medications for this visit.       Final diagnoses:   Abdominal pain, right upper quadrant     58-year-old male presents to the emergency department with concern over 3-day history of right upper quadrant abdominal pain accompanied by diarrhea which is exacerbated by eating and drinking, however has improved at this time.  He had stable vital signs upon arrival.  Physical exam findings as described above included benign nonsurgical abdominal exam without rebound, guarding, Dunaway sign.  As part of evaluation he did have laboratory testing including CBC, CMP, urinalysis which were all negative/within normal limits.  Given location of pain I did also consider possibility of pleuritic right-sided chest pain and obtain d-dimer which was negative effectively ruling out PE  Given presence of his GI symptoms, I do not suspect pneumonia, pneumothorax and will defer imaging of his chest at this time.  History does sound concerning for gallstones however I do not suspect acute cholecystitis at this time.  I did discuss risk/benefits of obtaining ultrasound at this time vs  ordering outpatient ultrasound and follow up at patients discretion and as he is currently pain free he agreed to defer at this time, outpatient order for ultrasound placed.  He was instructed to follow up for recheck with PCP if symptoms persist.  Worrisome reasons to return to ER sooner discussed.     Disclaimer: This note consists of symbols derived from keyboarding, dictation, and/or voice recognition software. As a result, there may be errors in the script that have gone undetected.  Please consider this when interpreting information found in the chart.    6/11/2019   [facility] EMERGENCY DEPARTMENT     Miranda Anderson PA-C  06/11/19 1739

## 2019-06-11 NOTE — NURSING NOTE
"Chief Complaint   Patient presents with     Abdominal Pain       Initial /86   Pulse 73   Temp 97.8  F (36.6  C) (Tympanic)   Resp 20   Ht 1.791 m (5' 10.5\")   Wt 105.8 kg (233 lb 4.8 oz)   SpO2 97%   BMI 33.00 kg/m   Estimated body mass index is 33 kg/m  as calculated from the following:    Height as of this encounter: 1.791 m (5' 10.5\").    Weight as of this encounter: 105.8 kg (233 lb 4.8 oz).    Patient presents to the clinic using No DME    Health Maintenance that is potentially due pending provider review:  NONE    n/a    Is there anyone who you would like to be able to receive your results? No  If yes have patient fill out CECILIO      "

## 2019-06-11 NOTE — ED NOTES
Pt here with complaints of RUQ pain that wraps around to the back. States that he is bloated, full of gas and feels better sitting up in the chair.

## 2019-06-20 DIAGNOSIS — I10 ESSENTIAL HYPERTENSION, BENIGN: ICD-10-CM

## 2019-06-20 DIAGNOSIS — M1A.9XX0 CHRONIC GOUT INVOLVING TOE WITHOUT TOPHUS, UNSPECIFIED CAUSE, UNSPECIFIED LATERALITY: ICD-10-CM

## 2019-06-20 RX ORDER — LISINOPRIL 20 MG/1
20 TABLET ORAL DAILY
Qty: 90 TABLET | Refills: 1 | Status: SHIPPED | OUTPATIENT
Start: 2019-06-20 | End: 2019-11-09

## 2019-06-20 RX ORDER — ALLOPURINOL 300 MG/1
300 TABLET ORAL DAILY
Qty: 90 TABLET | Refills: 1 | Status: SHIPPED | OUTPATIENT
Start: 2019-06-20 | End: 2019-11-09

## 2019-06-20 NOTE — TELEPHONE ENCOUNTER
"Requested Prescriptions   Pending Prescriptions Disp Refills     lisinopril (PRINIVIL/ZESTRIL) 20 MG tablet [Pharmacy Med Name: LISINOPRIL 20MG     TAB] 30 tablet 0     Sig: TAKE 1 TABLET BY MOUTH ONCE DAILY DUE  FOR  APPOINTMENT  AND  LABS  APRIL 2019  NO  FURTHER  REFILLS       ACE Inhibitors (Including Combos) Protocol Passed - 6/20/2019  7:25 AM        Passed - Blood pressure under 140/90 in past 12 months     BP Readings from Last 3 Encounters:   06/11/19 121/84   06/11/19 120/86   11/08/18 130/80                 Passed - Recent (12 mo) or future (30 days) visit within the authorizing provider's specialty     Patient had office visit in the last 12 months or has a visit in the next 30 days with authorizing provider or within the authorizing provider's specialty.  See \"Patient Info\" tab in inbasket, or \"Choose Columns\" in Meds & Orders section of the refill encounter.              Passed - Medication is active on med list        Passed - Patient is age 18 or older        Passed - Normal serum creatinine on file in past 12 months     Recent Labs   Lab Test 06/11/19  1619   CR 0.85             Passed - Normal serum potassium on file in past 12 months     Recent Labs   Lab Test 06/11/19  1619   POTASSIUM 3.9             allopurinol (ZYLOPRIM) 300 MG tablet [Pharmacy Med Name: ALLOPURINOL 300MG   TAB] 30 tablet 0     Sig: TAKE 1 TABLET BY MOUTH ONCE DAILY DUE  FOR  APPOINTMENT  AND  LABS  APRIL 2019.  NO  FURTHER  REFILLS       Gout Agents Protocol Failed - 6/20/2019  7:25 AM        Failed - Has Uric Acid on file in past 12 months and value is less than 6     Recent Labs   Lab Test 03/13/15  0848   URIC 4.5     If level is 6mg/dL or greater, ok to refill one time and refer to provider.           Passed - CBC on file in past 12 months     Recent Labs   Lab Test 06/11/19  1619   WBC 7.3   RBC 5.20   HGB 15.2   HCT 45.9                    Passed - ALT on file in past 12 months     Recent Labs   Lab Test " "06/11/19  1619   ALT 22             Passed - Recent (12 mo) or future (30 days) visit within the authorizing provider's specialty     Patient had office visit in the last 12 months or has a visit in the next 30 days with authorizing provider or within the authorizing provider's specialty.  See \"Patient Info\" tab in inbasket, or \"Choose Columns\" in Meds & Orders section of the refill encounter.              Passed - Medication is active on med list        Passed - Patient is age 18 or older        Passed - Normal serum creatinine on file in the past 12 months     Recent Labs   Lab Test 06/11/19  1619   CR 0.85             Allopurinol 300 mg      Last Written Prescription Date:  5/20/19  Last Fill Quantity: 30,   # refills: 0  Last Office Visit: 6/11/19  [facility]  Future Office visit:       Lisinopril 20 mg      Last Written Prescription Date:  5/20/19  Last Fill Quantity: 30,   # refills: 0  Last Office Visit: 6/11/19  [facility]  Future Office visit:         "

## 2019-09-02 ENCOUNTER — HOSPITAL ENCOUNTER (EMERGENCY)
Facility: CLINIC | Age: 59
Discharge: HOME | End: 2019-09-02
Attending: FAMILY MEDICINE | Admitting: FAMILY MEDICINE
Payer: COMMERCIAL

## 2019-09-02 ENCOUNTER — NURSE TRIAGE (OUTPATIENT)
Dept: NURSING | Facility: CLINIC | Age: 59
End: 2019-09-02

## 2019-09-02 VITALS
HEART RATE: 59 BPM | DIASTOLIC BLOOD PRESSURE: 94 MMHG | WEIGHT: 230 LBS | BODY MASS INDEX: 31.15 KG/M2 | HEIGHT: 72 IN | OXYGEN SATURATION: 97 % | SYSTOLIC BLOOD PRESSURE: 144 MMHG | RESPIRATION RATE: 12 BRPM | TEMPERATURE: 98 F

## 2019-09-02 DIAGNOSIS — K92.2 GASTROINTESTINAL HEMORRHAGE, UNSPECIFIED GASTROINTESTINAL HEMORRHAGE TYPE: ICD-10-CM

## 2019-09-02 LAB
ABO + RH BLD: NORMAL
ABO + RH BLD: NORMAL
ALBUMIN SERPL-MCNC: 3.5 G/DL (ref 3.4–5)
ALP SERPL-CCNC: 76 U/L (ref 40–150)
ALT SERPL W P-5'-P-CCNC: 25 U/L (ref 0–70)
ANION GAP SERPL CALCULATED.3IONS-SCNC: 7 MMOL/L (ref 3–14)
AST SERPL W P-5'-P-CCNC: 16 U/L (ref 0–45)
BASOPHILS # BLD AUTO: 0 10E9/L (ref 0–0.2)
BASOPHILS NFR BLD AUTO: 0.4 %
BILIRUB SERPL-MCNC: 0.5 MG/DL (ref 0.2–1.3)
BLD GP AB SCN SERPL QL: NORMAL
BLOOD BANK CMNT PATIENT-IMP: NORMAL
BUN SERPL-MCNC: 33 MG/DL (ref 7–30)
CALCIUM SERPL-MCNC: 7.9 MG/DL (ref 8.5–10.1)
CHLORIDE SERPL-SCNC: 112 MMOL/L (ref 94–109)
CO2 SERPL-SCNC: 23 MMOL/L (ref 20–32)
CREAT SERPL-MCNC: 0.85 MG/DL (ref 0.66–1.25)
DIFFERENTIAL METHOD BLD: NORMAL
EOSINOPHIL # BLD AUTO: 0.2 10E9/L (ref 0–0.7)
EOSINOPHIL NFR BLD AUTO: 2 %
ERYTHROCYTE [DISTWIDTH] IN BLOOD BY AUTOMATED COUNT: 12.8 % (ref 10–15)
GFR SERPL CREATININE-BSD FRML MDRD: >90 ML/MIN/{1.73_M2}
GLUCOSE SERPL-MCNC: 109 MG/DL (ref 70–99)
HCT VFR BLD AUTO: 42.8 % (ref 40–53)
HGB BLD-MCNC: 13.8 G/DL (ref 13.3–17.7)
IMM GRANULOCYTES # BLD: 0 10E9/L (ref 0–0.4)
IMM GRANULOCYTES NFR BLD: 0.4 %
INR PPP: 1.03 (ref 0.86–1.14)
LACTATE BLD-SCNC: 1.1 MMOL/L (ref 0.7–2)
LYMPHOCYTES # BLD AUTO: 1.7 10E9/L (ref 0.8–5.3)
LYMPHOCYTES NFR BLD AUTO: 22.2 %
MCH RBC QN AUTO: 29 PG (ref 26.5–33)
MCHC RBC AUTO-ENTMCNC: 32.2 G/DL (ref 31.5–36.5)
MCV RBC AUTO: 90 FL (ref 78–100)
MONOCYTES # BLD AUTO: 0.7 10E9/L (ref 0–1.3)
MONOCYTES NFR BLD AUTO: 8.9 %
NEUTROPHILS # BLD AUTO: 5.2 10E9/L (ref 1.6–8.3)
NEUTROPHILS NFR BLD AUTO: 66.1 %
NRBC # BLD AUTO: 0 10*3/UL
NRBC BLD AUTO-RTO: 0 /100
PLATELET # BLD AUTO: 181 10E9/L (ref 150–450)
POTASSIUM SERPL-SCNC: 4.5 MMOL/L (ref 3.4–5.3)
PROT SERPL-MCNC: 6.6 G/DL (ref 6.8–8.8)
RBC # BLD AUTO: 4.76 10E12/L (ref 4.4–5.9)
SODIUM SERPL-SCNC: 142 MMOL/L (ref 133–144)
SPECIMEN EXP DATE BLD: NORMAL
WBC # BLD AUTO: 7.8 10E9/L (ref 4–11)

## 2019-09-02 PROCEDURE — 86901 BLOOD TYPING SEROLOGIC RH(D): CPT | Performed by: FAMILY MEDICINE

## 2019-09-02 PROCEDURE — 86900 BLOOD TYPING SEROLOGIC ABO: CPT | Performed by: FAMILY MEDICINE

## 2019-09-02 PROCEDURE — 99284 EMERGENCY DEPT VISIT MOD MDM: CPT | Mod: Z6 | Performed by: FAMILY MEDICINE

## 2019-09-02 PROCEDURE — 99284 EMERGENCY DEPT VISIT MOD MDM: CPT | Mod: 25 | Performed by: FAMILY MEDICINE

## 2019-09-02 PROCEDURE — 96374 THER/PROPH/DIAG INJ IV PUSH: CPT | Performed by: FAMILY MEDICINE

## 2019-09-02 PROCEDURE — 80053 COMPREHEN METABOLIC PANEL: CPT | Performed by: FAMILY MEDICINE

## 2019-09-02 PROCEDURE — 96361 HYDRATE IV INFUSION ADD-ON: CPT | Performed by: FAMILY MEDICINE

## 2019-09-02 PROCEDURE — 25000128 H RX IP 250 OP 636: Performed by: FAMILY MEDICINE

## 2019-09-02 PROCEDURE — 25800030 ZZH RX IP 258 OP 636: Performed by: FAMILY MEDICINE

## 2019-09-02 PROCEDURE — C9113 INJ PANTOPRAZOLE SODIUM, VIA: HCPCS | Performed by: FAMILY MEDICINE

## 2019-09-02 PROCEDURE — 83605 ASSAY OF LACTIC ACID: CPT | Performed by: FAMILY MEDICINE

## 2019-09-02 PROCEDURE — 85610 PROTHROMBIN TIME: CPT | Performed by: FAMILY MEDICINE

## 2019-09-02 PROCEDURE — 86850 RBC ANTIBODY SCREEN: CPT | Performed by: FAMILY MEDICINE

## 2019-09-02 PROCEDURE — 85025 COMPLETE CBC W/AUTO DIFF WBC: CPT | Performed by: FAMILY MEDICINE

## 2019-09-02 RX ORDER — SODIUM CHLORIDE, SODIUM LACTATE, POTASSIUM CHLORIDE, CALCIUM CHLORIDE 600; 310; 30; 20 MG/100ML; MG/100ML; MG/100ML; MG/100ML
1000 INJECTION, SOLUTION INTRAVENOUS CONTINUOUS
Status: DISCONTINUED | OUTPATIENT
Start: 2019-09-02 | End: 2019-09-02 | Stop reason: HOSPADM

## 2019-09-02 RX ADMIN — SODIUM CHLORIDE, POTASSIUM CHLORIDE, SODIUM LACTATE AND CALCIUM CHLORIDE 1000 ML: 600; 310; 30; 20 INJECTION, SOLUTION INTRAVENOUS at 06:51

## 2019-09-02 RX ADMIN — PANTOPRAZOLE SODIUM 40 MG: 40 INJECTION, POWDER, FOR SOLUTION INTRAVENOUS at 06:51

## 2019-09-02 ASSESSMENT — MIFFLIN-ST. JEOR: SCORE: 1906.27

## 2019-09-02 NOTE — ED PROVIDER NOTES
History     Chief Complaint   Patient presents with     Hematemesis     Rectal Bleeding     HPI  Shon Sargent is a 58 year old male, past medical history significant for iron deficiency anemia, GERD, hypertension, resting tremor, gout, erectile dysfunction, psoriasis, presents to the emergency department with concerns of black/coffee-ground emesis x1 and black appearing bowel movements x3 the day of presentation.  History is obtained from the patient as well as his wife to a lesser extent who accompanies him.  The patient states he had a difficult time sleeping this past night with a lot of burping and belching and some left upper quadrant abdominal discomfort aching and cramping.  Really no nausea, he awoke with a coughing episode around 3 or 4:00 in the morning went to the bathroom and had a emesis of black/coffee-ground appearing material.  There was no gross blood.  He went back to bed when he got up later he had 2 bowel movements and fairly rapid succession that were black in appearance.  No blood.  He had a third episode of black appearing bowel movement in the restroom in triage upon arrival in our hospital.  There is no rectal pain or abdominal pain other than the left upper quadrant discomfort described.  The patient states that he has had an episode of black appearing stool maybe once or twice a month for years, had a similar episode to what he presents with today with the black appearing emesis and had upper and lower GI endoscopy done at this facility back in 2015 which is reviewed at the time of his presentation.  More recently the day before yesterday the patient was fishing and drank a few beers and that had a few bloody Sharon's afterward, yesterday he consumed 6 or 8 Advil tablets which he states he does 1 or 2 times per week for a variety of aches and pains.    Upper GI endoscopy 10/8/2015 report:  Impression:          - Normal nasopharynx and oropharynx.                        - Normal  esophagus.                        - Gastric ulcer containing adherent clot. Biopsied.                        - Gastritis.                        - Normal examined duodenum.   Recommendation:      - Discharge patient to home.                        - Continue present medications.   Allergies:  Allergies   Allergen Reactions     Valdecoxib Hives     Bextra       Problem List:    Patient Active Problem List    Diagnosis Date Noted     Iron deficiency anemia 03/18/2015     Priority: Medium     CARDIOVASCULAR SCREENING; LDL GOAL LESS THAN 130 03/13/2015     Priority: Medium     Esophageal reflux (GERD) 10/27/2014     Priority: Medium     Hypertension goal BP (blood pressure) < 140/90 10/20/2014     Priority: Medium     Tremor 10/20/2014     Priority: Medium     10/20/2014:He has been on a beta-blocker for this-propranolol.  It helped blood pressure and tremor but made him tired.   Strong family history tremor.        Gout 10/20/2014     Priority: Medium     10/20/2014:HE has done well with allopurinol.        ED (erectile dysfunction) 10/20/2014     Priority: Medium     Psoriasis 10/20/2014     Priority: Medium        Past Medical History:    Past Medical History:   Diagnosis Date     Hypertension        Past Surgical History:    Past Surgical History:   Procedure Laterality Date     COLONOSCOPY N/A 10/8/2015    Procedure: COLONOSCOPY;  Surgeon: Rolando Ayers MD;  Location: [facility]     ESOPHAGOSCOPY, GASTROSCOPY, DUODENOSCOPY (EGD), COMBINED N/A 11/3/2014    Procedure: COMBINED ESOPHAGOSCOPY, GASTROSCOPY, DUODENOSCOPY (EGD);  Surgeon: Rolando Ayers MD;  Location: [facility]     ESOPHAGOSCOPY, GASTROSCOPY, DUODENOSCOPY (EGD), COMBINED N/A 10/8/2015    Procedure: COMBINED ESOPHAGOSCOPY, GASTROSCOPY, DUODENOSCOPY (EGD), BIOPSY SINGLE OR MULTIPLE;  Surgeon: Rolando Ayers MD;  Location: [facility]     HERNIA REPAIR  2007    umbilical       Family History:    Family History   Problem Relation Age of Onset     Cancer Mother          lymphoma     Hypertension Father      Circulatory Father      Gastrointestinal Disease Father      Lipids Father      Heart Disease Father      Alzheimer Disease Maternal Grandfather      Anesthesia Reaction Maternal Grandfather      Cardiovascular Paternal Grandfather      Heart Disease Paternal Grandfather      Asthma Son         allergy induced     Asthma Son         al;lergy induced       Social History:  Marital Status:   [2]  Social History     Tobacco Use     Smoking status: Never Smoker     Smokeless tobacco: Never Used   Substance Use Topics     Alcohol use: Yes     Comment: occas     Drug use: No        Medications:      albuterol (PROAIR HFA/PROVENTIL HFA/VENTOLIN HFA) 108 (90 BASE) MCG/ACT Inhaler   allopurinol (ZYLOPRIM) 300 MG tablet   lisinopril (PRINIVIL/ZESTRIL) 20 MG tablet   Omega-3 Fatty Acids (FISH OIL PO)   omeprazole (PRILOSEC) 20 MG DR capsule   Probiotic Product (FORTIFY DAILY PROBIOTIC PO)   sildenafil (REVATIO) 20 MG tablet         Review of Systems   All other systems reviewed and are negative.      Physical Exam   BP: (!) 144/106  Pulse: 70  Heart Rate: 66  Temp: 98  F (36.7  C)  Resp: 18  Height: 182.9 cm (6')  Weight: 104.3 kg (230 lb)  SpO2: 96 %  Lying Orthostatic BP: 147/100  Lying Orthostatic Pulse: 61 bpm  Standing Orthostatic BP: 154/106  Standing Orthostatic Pulse: 71 bpm      Physical Exam   Constitutional: He is oriented to person, place, and time. He appears well-developed and well-nourished.   HENT:   Head: Normocephalic and atraumatic.   Eyes: Pupils are equal, round, and reactive to light. Conjunctivae and EOM are normal.   Neck: Normal range of motion. Neck supple.   Cardiovascular: Normal rate, regular rhythm, normal heart sounds and intact distal pulses.   Pulmonary/Chest: Effort normal and breath sounds normal.   Abdominal: Soft. Bowel sounds are normal. He exhibits no distension. There is no tenderness. There is no rebound and no guarding.   Musculoskeletal:  Normal range of motion.   Neurological: He is alert and oriented to person, place, and time.   Skin: Skin is warm and dry. Capillary refill takes less than 2 seconds.   Psychiatric: He has a normal mood and affect. His behavior is normal.   Nursing note and vitals reviewed.      ED Course        Procedures               Critical Care time:  none               Results for orders placed or performed during the hospital encounter of 09/02/19 (from the past 24 hour(s))   CBC with platelets differential   Result Value Ref Range    WBC 7.8 4.0 - 11.0 10e9/L    RBC Count 4.76 4.4 - 5.9 10e12/L    Hemoglobin 13.8 13.3 - 17.7 g/dL    Hematocrit 42.8 40.0 - 53.0 %    MCV 90 78 - 100 fl    MCH 29.0 26.5 - 33.0 pg    MCHC 32.2 31.5 - 36.5 g/dL    RDW 12.8 10.0 - 15.0 %    Platelet Count 181 150 - 450 10e9/L    Diff Method Automated Method     % Neutrophils 66.1 %    % Lymphocytes 22.2 %    % Monocytes 8.9 %    % Eosinophils 2.0 %    % Basophils 0.4 %    % Immature Granulocytes 0.4 %    Nucleated RBCs 0 0 /100    Absolute Neutrophil 5.2 1.6 - 8.3 10e9/L    Absolute Lymphocytes 1.7 0.8 - 5.3 10e9/L    Absolute Monocytes 0.7 0.0 - 1.3 10e9/L    Absolute Eosinophils 0.2 0.0 - 0.7 10e9/L    Absolute Basophils 0.0 0.0 - 0.2 10e9/L    Abs Immature Granulocytes 0.0 0 - 0.4 10e9/L    Absolute Nucleated RBC 0.0    Comprehensive metabolic panel   Result Value Ref Range    Sodium 142 133 - 144 mmol/L    Potassium 4.5 3.4 - 5.3 mmol/L    Chloride 112 (H) 94 - 109 mmol/L    Carbon Dioxide 23 20 - 32 mmol/L    Anion Gap 7 3 - 14 mmol/L    Glucose 109 (H) 70 - 99 mg/dL    Urea Nitrogen 33 (H) 7 - 30 mg/dL    Creatinine 0.85 0.66 - 1.25 mg/dL    GFR Estimate >90 >60 mL/min/[1.73_m2]    GFR Estimate If Black >90 >60 mL/min/[1.73_m2]    Calcium 7.9 (L) 8.5 - 10.1 mg/dL    Bilirubin Total 0.5 0.2 - 1.3 mg/dL    Albumin 3.5 3.4 - 5.0 g/dL    Protein Total 6.6 (L) 6.8 - 8.8 g/dL    Alkaline Phosphatase 76 40 - 150 U/L    ALT 25 0 - 70 U/L    AST 16  0 - 45 U/L   INR   Result Value Ref Range    INR 1.03 0.86 - 1.14   ABO/Rh type and screen   Result Value Ref Range    ABO O     RH(D) Pos     Antibody Screen Neg     Test Valid Only At [facility]        Specimen Expires 09/05/2019    Lactic acid whole blood   Result Value Ref Range    Lactic Acid 1.1 0.7 - 2.0 mmol/L       Medications   lactated ringers BOLUS 1,000 mL (1,000 mLs Intravenous New Bag 9/2/19 0651)     Followed by   lactated ringers infusion (has no administration in time range)   pantoprazole (PROTONIX) 40 mg IV push injection (40 mg Intravenous Given 9/2/19 0651)       Assessments & Plan (with Medical Decision Making)   58-year-old male past medical history reviewed as above who presents to the emergency department with concerns of coffee-ground type emesis x1 followed by melanotic appearing stools as described in the HPI.  On exam the patient is alert and in no acute distress.  He has no resting tachycardia or vital sign instability at the time of presentation or throughout the course of his emergency department visit which was in excess of 3 hours.  Had no orthostatic changes of significance in his vital signs.  He manifested no tenderness to palpation on exam of the abdomen.  His exam showed no significant physical findings.  Lab diagnostics are reviewed as above demonstrating a normal hemoglobin of 13.8 normal white count of 7.8 and platelets also within normal limits.  Basic chemistry notes a slightly elevated BUN which is not unanticipated given the patient's history and suspected gastric irritation given his significant alcohol and NSAID use recently.  We also note on review from his upper GI endoscopy 4 years ago that the patient had a gastric ulcer containing adherent clot at that time as well as gastritis.  All of this was reviewed with him and discussed at length.  The patient is adamant that he wants to go home and I think this is actually quite reasonable given his  demonstrated stability of all parameters that one would normally consider with this type of presentation.  He lives a short distance from hospital and if he experiences any recurrence of upper intestinal symptoms he is to return.  I warned him that he may anticipate black in stools over the next day or 2 but this should diminish and resolve.  If it does not he will follow-up in clinic with his primary care provider.  More importantly the patient should not be drinking alcohol she should not be using NSAID class medications again.  He should continue on his Nexium.  Indications for return to the emergency department were reviewed, as well as follow-up in clinic.  The patient is dispositioned home to the care of his wife.      Disclaimer: This note consists of symbols derived from keyboarding, dictation and/or voice recognition software. As a result, there may be errors in the script that have gone undetected. Please consider this when interpreting information found in this chart.      I have reviewed the nursing notes.    I have reviewed the findings, diagnosis, plan and need for follow up with the patient.       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

## 2019-09-02 NOTE — DISCHARGE INSTRUCTIONS
Dugger diet and clear fluids throughout today and tomorrow.  Please continue your daily Nexium.  You should not drink alcohol.  You should not be using NSAID class medications i.e. Ibuprofen/aspirin.  If you have any repeat upper intestinal bleeding as evidenced by vomiting of black/coffee-ground/bloody emesis return to the emergency department.  You may anticipate having 1 or 2 black/dark and stools over the next couple of days.  This should stop over the next 1 to 2 days.  If it increases or persists you need to have follow-up and discussion of endoscopy.

## 2019-09-02 NOTE — ED AVS SNAPSHOT
[facility] Emergency Department  [address] [address]  Phone:  [phone]  Fax:  [phone]                                    Shon Sargent   MRN: [id number]    Department:  [facility] Emergency Department   Date of Visit:  9/2/2019           After Visit Summary Signature Page    I have received my discharge instructions, and my questions have been answered. I have discussed any challenges I see with this plan with the nurse or doctor.    ..........................................................................................................................................  Patient/Patient Representative Signature      ..........................................................................................................................................  Patient Representative Print Name and Relationship to Patient    ..................................................               ................................................  Date                                   Time    ..........................................................................................................................................  Reviewed by Signature/Title    ...................................................              ..............................................  Date                                               Time          22EPIC Rev 08/18

## 2019-09-02 NOTE — ED TRIAGE NOTES
Here for hematemesis and blood in stool. Has had this intermittently since spring 2019. One episode at least monthly since. States coffee ground emesis at 0300 and just after arriving at [facility] ED in [facility] bathroom. Also melena. Left sided abdominal pain. Seen in the spring at [facility] for this. Was told possible gallstones.

## 2019-09-02 NOTE — TELEPHONE ENCOUNTER
"Caller states he went out drinking a few days ago with friends. Patient states he woke up vomiting coffee ground colored emesis. Triage guidelines recommend to go to ED. Caller verbalized and understands directives.    Reason for Disposition    [1] Vomiting AND [2] contains red blood or black (\"coffee ground\") material  (Exception: few red streaks in vomit that only happened once)    Additional Information    Negative: Shock suspected (e.g., cold/pale/clammy skin, too weak to stand, low BP, rapid pulse)    Negative: Difficult to awaken or acting confused (e.g., disoriented, slurred speech)    Negative: Sounds like a life-threatening emergency to the triager    Negative: Vomiting occurs only while coughing    Negative: [1] Pregnant < 20 Weeks AND [2] nausea/vomiting began in early pregnancy (i.e., 4-8 weeks pregnant)    Negative: Chest pain    Negative: Headache is main symptom    Negative: Vomiting (or Nausea) in a cancer patient who is currently (or recently) receiving chemotherapy or radiation therapy, or cancer patient who has metastatic or end-stage cancer and is receiving palliative care    Protocols used: VOMITING-A-AH      "

## 2019-11-09 DIAGNOSIS — K21.9 GASTROESOPHAGEAL REFLUX DISEASE WITHOUT ESOPHAGITIS: ICD-10-CM

## 2019-11-09 DIAGNOSIS — I10 ESSENTIAL HYPERTENSION, BENIGN: ICD-10-CM

## 2019-11-09 DIAGNOSIS — M1A.9XX0 CHRONIC GOUT INVOLVING TOE WITHOUT TOPHUS, UNSPECIFIED CAUSE, UNSPECIFIED LATERALITY: ICD-10-CM

## 2019-11-10 NOTE — TELEPHONE ENCOUNTER
"OMEPRAZOLE 20MG CAP----  Last Written Prescription Date:  12/18/2018  Last Fill Quantity: 90,  # refills: 3   Last office visit: 6/11/2019 with prescribing provider:  ALEJANDRO   Future Office Visit:      Allopurinol----Lisinopril  Last Written Prescription Date:  6/20/2019  Last Fill Quantity: 90,  # refills: 1   Last office visit: 6/11/2019 with prescribing provider:  ALEJANDRO   Future Office Visit:    Requested Prescriptions   Pending Prescriptions Disp Refills     allopurinol (ZYLOPRIM) 300 MG tablet [Pharmacy Med Name: ALLOPURINOL 300MG   TAB] 90 tablet 1     Sig: TAKE 1 TABLET BY MOUTH ONCE DAILY       Gout Agents Protocol Failed - 11/9/2019 12:18 PM        Failed - Has Uric Acid on file in past 12 months and value is less than 6     Recent Labs   Lab Test 03/13/15  0848   URIC 4.5     If level is 6mg/dL or greater, ok to refill one time and refer to provider.           Passed - CBC on file in past 12 months     Recent Labs   Lab Test 09/02/19  0619   WBC 7.8   RBC 4.76   HGB 13.8   HCT 42.8                    Passed - ALT on file in past 12 months     Recent Labs   Lab Test 09/02/19  0619   ALT 25             Passed - Recent (12 mo) or future (30 days) visit within the authorizing provider's specialty     Patient has had an office visit with the authorizing provider or a provider within the authorizing providers department within the previous 12 mos or has a future within next 30 days. See \"Patient Info\" tab in inbasket, or \"Choose Columns\" in Meds & Orders section of the refill encounter.              Passed - Medication is active on med list        Passed - Patient is age 18 or older        Passed - Normal serum creatinine on file in the past 12 months     Recent Labs   Lab Test 09/02/19  0619   CR 0.85             lisinopril (PRINIVIL/ZESTRIL) 20 MG tablet [Pharmacy Med Name: LISINOPRIL 20MG     TAB] 90 tablet 1     Sig: TAKE 1 TABLET BY MOUTH ONCE DAILY       ACE Inhibitors (Including Combos) Protocol Failed - " "11/9/2019 12:18 PM        Failed - Blood pressure under 140/90 in past 12 months     BP Readings from Last 3 Encounters:   09/02/19 (!) 144/94   06/11/19 121/84   06/11/19 120/86                 Passed - Recent (12 mo) or future (30 days) visit within the authorizing provider's specialty     Patient has had an office visit with the authorizing provider or a provider within the authorizing providers department within the previous 12 mos or has a future within next 30 days. See \"Patient Info\" tab in inbasket, or \"Choose Columns\" in Meds & Orders section of the refill encounter.              Passed - Medication is active on med list        Passed - Patient is age 18 or older        Passed - Normal serum creatinine on file in past 12 months     Recent Labs   Lab Test 09/02/19 0619   CR 0.85             Passed - Normal serum potassium on file in past 12 months     Recent Labs   Lab Test 09/02/19  0619   POTASSIUM 4.5             omeprazole (PRILOSEC) 20 MG DR capsule [Pharmacy Med Name: OMEPRAZOLE 20MG CAP] 90 capsule 3     Sig: TAKE 1 CAPSULE BY MOUTH ONCE DAILY       PPI Protocol Passed - 11/9/2019 12:18 PM        Passed - Not on Clopidogrel (unless Pantoprazole ordered)        Passed - No diagnosis of osteoporosis on record        Passed - Recent (12 mo) or future (30 days) visit within the authorizing provider's specialty     Patient has had an office visit with the authorizing provider or a provider within the authorizing providers department within the previous 12 mos or has a future within next 30 days. See \"Patient Info\" tab in [email or web address], or \"Choose Columns\" in Meds & Orders section of the refill encounter.              Passed - Medication is active on med list        Passed - Patient is age 18 or older          "

## 2019-11-11 RX ORDER — LISINOPRIL 20 MG/1
20 TABLET ORAL DAILY
Qty: 90 TABLET | Refills: 0 | Status: SHIPPED | OUTPATIENT
Start: 2019-11-11 | End: 2019-12-27

## 2019-11-11 RX ORDER — ALLOPURINOL 300 MG/1
TABLET ORAL
Qty: 90 TABLET | Refills: 1 | Status: SHIPPED | OUTPATIENT
Start: 2019-11-11 | End: 2019-12-27

## 2019-12-27 ENCOUNTER — OFFICE VISIT (OUTPATIENT)
Dept: FAMILY MEDICINE | Facility: CLINIC | Age: 59
End: 2019-12-27
Payer: COMMERCIAL

## 2019-12-27 VITALS
SYSTOLIC BLOOD PRESSURE: 134 MMHG | HEART RATE: 66 BPM | BODY MASS INDEX: 34.24 KG/M2 | DIASTOLIC BLOOD PRESSURE: 82 MMHG | OXYGEN SATURATION: 98 % | WEIGHT: 244.6 LBS | RESPIRATION RATE: 16 BRPM | HEIGHT: 71 IN | TEMPERATURE: 97.7 F

## 2019-12-27 DIAGNOSIS — N52.9 ERECTILE DYSFUNCTION, UNSPECIFIED ERECTILE DYSFUNCTION TYPE: ICD-10-CM

## 2019-12-27 DIAGNOSIS — K21.9 GASTROESOPHAGEAL REFLUX DISEASE WITHOUT ESOPHAGITIS: ICD-10-CM

## 2019-12-27 DIAGNOSIS — M25.551 HIP PAIN, RIGHT: ICD-10-CM

## 2019-12-27 DIAGNOSIS — H91.93 BILATERAL HEARING LOSS, UNSPECIFIED HEARING LOSS TYPE: ICD-10-CM

## 2019-12-27 DIAGNOSIS — M1A.9XX0 CHRONIC GOUT INVOLVING TOE WITHOUT TOPHUS, UNSPECIFIED CAUSE, UNSPECIFIED LATERALITY: ICD-10-CM

## 2019-12-27 DIAGNOSIS — R39.12 BENIGN PROSTATIC HYPERPLASIA WITH WEAK URINARY STREAM: ICD-10-CM

## 2019-12-27 DIAGNOSIS — Z12.5 SCREENING FOR PROSTATE CANCER: ICD-10-CM

## 2019-12-27 DIAGNOSIS — Z13.6 CARDIOVASCULAR SCREENING; LDL GOAL LESS THAN 130: ICD-10-CM

## 2019-12-27 DIAGNOSIS — R05.9 COUGH: ICD-10-CM

## 2019-12-27 DIAGNOSIS — Z23 NEED FOR VACCINATION: ICD-10-CM

## 2019-12-27 DIAGNOSIS — I10 ESSENTIAL HYPERTENSION WITH GOAL BLOOD PRESSURE LESS THAN 140/90: ICD-10-CM

## 2019-12-27 DIAGNOSIS — Z00.00 PREVENTATIVE HEALTH CARE: Primary | ICD-10-CM

## 2019-12-27 DIAGNOSIS — N40.1 BENIGN PROSTATIC HYPERPLASIA WITH WEAK URINARY STREAM: ICD-10-CM

## 2019-12-27 LAB
CHOLEST SERPL-MCNC: 213 MG/DL
HDLC SERPL-MCNC: 42 MG/DL
LDLC SERPL CALC-MCNC: 138 MG/DL
NONHDLC SERPL-MCNC: 171 MG/DL
PSA SERPL-ACNC: 4.76 UG/L (ref 0–4)
TRIGL SERPL-MCNC: 166 MG/DL
URATE SERPL-MCNC: 4.7 MG/DL (ref 3.5–7.2)

## 2019-12-27 PROCEDURE — 99213 OFFICE O/P EST LOW 20 MIN: CPT | Mod: 25 | Performed by: FAMILY MEDICINE

## 2019-12-27 PROCEDURE — 36415 COLL VENOUS BLD VENIPUNCTURE: CPT | Performed by: FAMILY MEDICINE

## 2019-12-27 PROCEDURE — 80061 LIPID PANEL: CPT | Performed by: FAMILY MEDICINE

## 2019-12-27 PROCEDURE — 99396 PREV VISIT EST AGE 40-64: CPT | Mod: 25 | Performed by: FAMILY MEDICINE

## 2019-12-27 PROCEDURE — 90715 TDAP VACCINE 7 YRS/> IM: CPT | Performed by: FAMILY MEDICINE

## 2019-12-27 PROCEDURE — 90471 IMMUNIZATION ADMIN: CPT | Performed by: FAMILY MEDICINE

## 2019-12-27 PROCEDURE — 84550 ASSAY OF BLOOD/URIC ACID: CPT | Performed by: FAMILY MEDICINE

## 2019-12-27 PROCEDURE — G0103 PSA SCREENING: HCPCS | Performed by: FAMILY MEDICINE

## 2019-12-27 RX ORDER — LISINOPRIL 20 MG/1
20 TABLET ORAL DAILY
Qty: 90 TABLET | Refills: 3 | Status: SHIPPED | OUTPATIENT
Start: 2019-12-27 | End: 2021-03-22

## 2019-12-27 RX ORDER — TAMSULOSIN HYDROCHLORIDE 0.4 MG/1
0.4 CAPSULE ORAL DAILY
Qty: 30 CAPSULE | Refills: 11 | Status: SHIPPED | OUTPATIENT
Start: 2019-12-27 | End: 2022-06-20

## 2019-12-27 RX ORDER — SILDENAFIL CITRATE 20 MG/1
TABLET ORAL
Qty: 30 TABLET | Refills: 11 | Status: SHIPPED | OUTPATIENT
Start: 2019-12-27 | End: 2021-05-25

## 2019-12-27 RX ORDER — ALBUTEROL SULFATE 90 UG/1
2 AEROSOL, METERED RESPIRATORY (INHALATION) EVERY 6 HOURS PRN
Qty: 1 INHALER | Refills: 11 | Status: SHIPPED | OUTPATIENT
Start: 2019-12-27 | End: 2021-01-05

## 2019-12-27 RX ORDER — ALLOPURINOL 300 MG/1
1 TABLET ORAL DAILY
Qty: 90 TABLET | Refills: 3 | Status: SHIPPED | OUTPATIENT
Start: 2019-12-27 | End: 2021-03-22

## 2019-12-27 ASSESSMENT — ENCOUNTER SYMPTOMS
FEVER: 0
SORE THROAT: 0
HEMATURIA: 0
FREQUENCY: 1
DIZZINESS: 0
ARTHRALGIAS: 1
ABDOMINAL PAIN: 0
DYSURIA: 0
DIARRHEA: 0
HEMATOCHEZIA: 0
WEAKNESS: 0
HEADACHES: 0
PARESTHESIAS: 0
SHORTNESS OF BREATH: 0
CONSTIPATION: 0
JOINT SWELLING: 0
HEARTBURN: 0
COUGH: 0
EYE PAIN: 0
NERVOUS/ANXIOUS: 0
PALPITATIONS: 0
CHILLS: 0
NAUSEA: 0
MYALGIAS: 1

## 2019-12-27 ASSESSMENT — PAIN SCALES - GENERAL: PAINLEVEL: NO PAIN (0)

## 2019-12-27 ASSESSMENT — MIFFLIN-ST. JEOR: SCORE: 1951.63

## 2019-12-27 NOTE — NURSING NOTE
"Chief Complaint   Patient presents with     Physical       Initial BP (!) 130/94 (BP Location: Right arm, Patient Position: Chair, Cuff Size: Adult Large)   Pulse 66   Temp 97.7  F (36.5  C) (Tympanic)   Resp 16   Ht 1.803 m (5' 11\")   Wt 110.9 kg (244 lb 9.6 oz)   SpO2 98%   BMI 34.11 kg/m   Estimated body mass index is 34.11 kg/m  as calculated from the following:    Height as of this encounter: 1.803 m (5' 11\").    Weight as of this encounter: 110.9 kg (244 lb 9.6 oz).    Patient presents to the clinic using No DME    Health Maintenance that is potentially due pending provider review:  NONE    n/a    Is there anyone who you would like to be able to receive your results? No  If yes have patient fill out CECILIO  Katiuska Shaw CMA  Prior to immunization administration, verified patients identity using patient s name and date of birth. Please see Immunization Activity for additional information.     Screening Questionnaire for Adult Immunization    Are you sick today?   No   Do you have allergies to medications, food, a vaccine component or latex?   Yes   Have you ever had a serious reaction after receiving a vaccination?   No   Do you have a long-term health problem with heart, lung, kidney, or metabolic disease (e.g., diabetes), asthma, a blood disorder, no spleen, complement component deficiency, a cochlear implant, or a spinal fluid leak?  Are you on long-term aspirin therapy?   No   Do you have cancer, leukemia, HIV/AIDS, or any other immune system problem?   No   Do you have a parent, brother, or sister with an immune system problem?   No   In the past 3 months, have you taken medications that affect  your immune system, such as prednisone, other steroids, or anticancer drugs; drugs for the treatment of rheumatoid arthritis, Crohn s disease, or psoriasis; or have you had radiation treatments?   No   Have you had a seizure, or a brain or other nervous system problem?   No   During the past year, have you " received a transfusion of blood or blood    products, or been given immune (gamma) globulin or antiviral drug?   No   For women: Are you pregnant or is there a chance you could become       pregnant during the next month?   No   Have you received any vaccinations in the past 4 weeks?   No     Immunization questionnaire was positive for at least one answer.  Notified .        Per orders of Dr. Sandhu, injection of ADACEL given by Katiuska Shaw CMA. Patient instructed to remain in clinic for 15 minutes afterwards, and to report any adverse reaction to me immediately.       Screening performed by Katiuska Shaw CMA on 12/27/2019 at 11:15 AM.  Katiuska Shaw CMA

## 2019-12-27 NOTE — PROGRESS NOTES
SUBJECTIVE:   CC: Shon Sargent is an 59 year old male who presents for preventative health visit.   Chief Complaint   Patient presents with     Physical      Needs refills.     Seen in ED in September for GI bleeding.   He had been doing a lot of drinking with a friend and taking ibuprofen. He had blood in emesis.  No problems since.      Does not drink alcohol often.  Very rarely drinks now.  Taking ibuprofen infrequently.      He notes decrease in urinary stream.  Some urgency.  NocturiaX1.  Empties OK if waits long enough.   Worse in the past year.     He has been to ortho for right hip pain.  He has had shots in the past which helped.  He has significant osteoarthritis.  Would like to see ortho again.  INterfering with daily life.  Difficulty sleeping.     Healthy Habits:     Getting at least 3 servings of Calcium per day:  Yes    Bi-annual eye exam:  NO    Dental care twice a year:  NO    Sleep apnea or symptoms of sleep apnea:  Excessive snoring    Diet:  Regular (no restrictions)    Frequency of exercise:  2-3 days/week    Duration of exercise:  30-45 minutes    Taking medications regularly:  Yes    Medication side effects:  None    PHQ-2 Total Score: 0    Additional concerns today:  No  Exercise:some walking 2-3 times a week.    Physical work.     Today's PHQ-2 Score:   PHQ-2 ( 1999 [employer]) 12/27/2019   Q1: Little interest or pleasure in doing things 0   Q2: Feeling down, depressed or hopeless 0   PHQ-2 Score 0   Q1: Little interest or pleasure in doing things Not at all   Q2: Feeling down, depressed or hopeless Not at all   PHQ-2 Score 0     Abuse: Current or Past(Physical, Sexual or Emotional)- No  Do you feel safe in your environment? Yes    Have you ever done Advance Care Planning? (For example, a Health Directive, POLST, or a discussion with a medical provider or your loved ones about your wishes): Yes, advance care planning is on file.    Social History     Tobacco Use     Smoking status: Never  Smoker     Smokeless tobacco: Never Used   Substance Use Topics     Alcohol use: Yes     Comment: occas       Alcohol Use 2019   Prescreen: >3 drinks/day or >7 drinks/week? No       Last PSA: No results found for: PSA     Patient Active Problem List    Diagnosis Date Noted     Iron deficiency anemia 2015     Priority: Medium     CARDIOVASCULAR SCREENING; LDL GOAL LESS THAN 130 2015     Priority: Medium     Esophageal reflux (GERD) 10/27/2014     Priority: Medium     Hypertension goal BP (blood pressure) < 140/90 10/20/2014     Priority: Medium     Tremor 10/20/2014     Priority: Medium     10/20/2014:He has been on a beta-blocker for this-propranolol.  It helped blood pressure and tremor but made him tired.   Strong family history tremor.        Gout 10/20/2014     Priority: Medium     10/20/2014:HE has done well with allopurinol.        ED (erectile dysfunction) 10/20/2014     Priority: Medium     Psoriasis 10/20/2014     Priority: Medium        Family history:  CV disease: no  Prostate cancer: MGF  Colon cancer: no    Multivitamin or Vit D use: fish oil    Vaccines:tetanus today.    Declined flu shot.      Past Colon cancer screenin    Review of Systems   Constitutional: Negative for chills and fever.   HENT: Positive for hearing loss. Negative for congestion, ear pain and sore throat.    Eyes: Negative for pain and visual disturbance.   Respiratory: Negative for cough and shortness of breath.    Cardiovascular: Negative for chest pain, palpitations and peripheral edema.   Gastrointestinal: Negative for abdominal pain, constipation, diarrhea, heartburn, hematochezia and nausea.   Genitourinary: Positive for frequency, impotence and urgency. Negative for discharge, dysuria, genital sores and hematuria.   Musculoskeletal: Positive for arthralgias and myalgias. Negative for joint swelling.   Skin: Negative for rash.   Neurological: Negative for dizziness, weakness, headaches and paresthesias.  "  Psychiatric/Behavioral: Negative for mood changes. The patient is not nervous/anxious.    Hearing loss he will check out at some time.    Erectile problems.  Takes sildenafil 20mg helps.  Can get headache and blurred vision.  Seems like his blood pressure is higher.   Not checking blood pressure at home.   Some aching all over in joints.   No gout episodes.   Some skin irritation.  Can be back of neck, face or all over.   Uses albuteral inhaler on occasion.  Cold air irritates.       OBJECTIVE:                                                    OBJECTIVE:Blood pressure (!) 130/94, pulse 66, temperature 97.7  F (36.5  C), temperature source Tympanic, resp. rate 16, height 1.803 m (5' 11\"), weight 110.9 kg (244 lb 9.6 oz), SpO2 98 %. BMI=Body mass index is 34.11 kg/m .  GENERAL APPEARANCE ADULT: Alert, no acute distress, obese  EYES: PERRL, EOM normal, conjunctiva and lids normal  HENT: Ears and TMs normal, oral mucosa and posterior oropharynx normal  NECK: No adenopathy,masses or thyromegaly  RESP: lungs clear to auscultation   CV: normal rate, regular rhythm, no murmur or gallop  ABDOMEN: soft, no organomegaly, masses or tenderness   (male): penis, scrotum, testes normal, no hernias, rectal exam normal, prostate normal  MS: extremities normal, no peripheral edema  Recheck 134/82    ASSESSMENT/PLAN:                                                    Lifestyle recommendations:regular exercise, at least 150 minutes per week (average 30 minutes 5 times a week)  weight loss recommended (ideal BMI-body mass index is <25)  The following exams/tests were recommended and discussed for health maintenance:  Colon cancer screening recommended 2025  Prostate cancer screening-PSA test due to prostate symptoms.     (Z00.00) Preventative health care  (primary encounter diagnosis)  Comment:   Plan: Fasting blood tests today    (I10) Essential hypertension with goal blood pressure less than 140/90  Comment: doing OK  Plan: " lisinopril (PRINIVIL/ZESTRIL) 20 MG tablet        No change in current treatment plan.   Monitor BP periodically.  This can be done at home, in clinic, at our pharmacy, at a store or by neighbor or relative with a blood pressure cuff.  You should be sitting and relaxed for several minutes when taking blood pressure.   Goal BP (most readings) should be less than 140/90    Normal blood pressure is 120/80.    If your blood pressure is consistently at or above the goal, some changes should be made with lifestyle or medication to keep blood pressure under good control.    High blood pressure over time can lead to eye and kidney damage and increase risk for heart attacks and strokes.   Let us know if readings are often high, so we can help get your blood pressure under good control.      (N52.9) Erectile dysfunction, unspecified erectile dysfunction type  Comment:   Plan: sildenafil (REVATIO) 20 MG tablet        Refills.     (K21.9) Gastroesophageal reflux disease without esophagitis  Comment:   Plan: omeprazole (PRILOSEC) 20 MG DR capsule        REfills.     (M1A.9XX0) Chronic gout involving toe without tophus, unspecified cause, unspecified laterality  Comment: no recent gout  Plan: allopurinol (ZYLOPRIM) 300 MG tablet        REfills.     (R05) Cough  Comment: occasional use  Plan: albuterol (PROAIR HFA/PROVENTIL HFA/VENTOLIN         HFA) 108 (90 Base) MCG/ACT inhaler        REfills.   Albuteral inhaler can be used taking 2 inhalations every 4 hours as needed for coughing, wheezing or shortness of breath.     (Z23) Need for vaccination  Comment:   Plan: Tetanus vaccine.    Flu shot declined.     (N40.1,  R39.12) Benign prostatic hyperplasia with weak urinary stream  Comment: likely all from enlarged prostate.    Plan: Check tests for prostate cancer.    Trial tamulosin 0.4mg at bedtime for a month.     (H91.93) Bilateral hearing loss, unspecified hearing loss type  Comment:   Plan: AUDIOLOGY ADULT REFERRAL         "Schedule an appointment with audiology to check hearing.     (M21.348) Hip pain, right  Comment: osteoarthritis   Plan: ORTHO  REFERRAL        Schedule an appointment with orthopedic surgeon    (Z12.5) Screening for prostate cancer  Comment:   Plan: PSA, screen          (Z13.6) CARDIOVASCULAR SCREENING; LDL GOAL LESS THAN 130  Comment:   Plan: Blood tests for cholesterol today.         COUNSELING:   Reviewed preventive health counseling, as reflected in patient instructions  Special attention given to:        Regular exercise       HIV screeninx in teen years, 1x in adult years, and at intervals if high risk       Prostate cancer screening    Estimated body mass index is 34.11 kg/m  as calculated from the following:    Height as of this encounter: 1.803 m (5' 11\").    Weight as of this encounter: 110.9 kg (244 lb 9.6 oz).     Weight management plan: Discussed healthy diet and exercise guidelines     reports that he has never smoked. He has never used smokeless tobacco.      Counseling Resources:  ATP IV Guidelines  Pooled Cohorts Equation Calculator  FRAX Risk Assessment  ICSI Preventive Guidelines  Dietary Guidelines for Americans, 2010  USDA's MyPlate  ASA Prophylaxis  Lung CA Screening    Andrea Sandhu MD  [facility]  "

## 2019-12-27 NOTE — PATIENT INSTRUCTIONS
ASSESSMENT/PLAN:                                                    Lifestyle recommendations:regular exercise, at least 150 minutes per week (average 30 minutes 5 times a week)  weight loss recommended (ideal BMI-body mass index is <25)  The following exams/tests were recommended and discussed for health maintenance:  Colon cancer screening recommended 2025  Prostate cancer screening-PSA test due to prostate symptoms.     (Z00.00) Preventative health care  (primary encounter diagnosis)  Comment:   Plan: Fasting blood tests today    (I10) Essential hypertension with goal blood pressure less than 140/90  Comment: doing OK  Plan: lisinopril (PRINIVIL/ZESTRIL) 20 MG tablet        No change in current treatment plan.   Monitor BP periodically.  This can be done at home, in clinic, at our pharmacy, at a store or by neighbor or relative with a blood pressure cuff.  You should be sitting and relaxed for several minutes when taking blood pressure.   Goal BP (most readings) should be less than 140/90    Normal blood pressure is 120/80.    If your blood pressure is consistently at or above the goal, some changes should be made with lifestyle or medication to keep blood pressure under good control.    High blood pressure over time can lead to eye and kidney damage and increase risk for heart attacks and strokes.   Let us know if readings are often high, so we can help get your blood pressure under good control.      (N52.9) Erectile dysfunction, unspecified erectile dysfunction type  Comment:   Plan: sildenafil (REVATIO) 20 MG tablet        Refills.     (K21.9) Gastroesophageal reflux disease without esophagitis  Comment:   Plan: omeprazole (PRILOSEC) 20 MG DR capsule        REfills.     (M1A.9XX0) Chronic gout involving toe without tophus, unspecified cause, unspecified laterality  Comment: no recent gout  Plan: allopurinol (ZYLOPRIM) 300 MG tablet        REfills.     (R05) Cough  Comment: occasional use  Plan: albuterol  (PROAIR HFA/PROVENTIL HFA/VENTOLIN         HFA) 108 (90 Base) MCG/ACT inhaler        REfills.   Albuteral inhaler can be used taking 2 inhalations every 4 hours as needed for coughing, wheezing or shortness of breath.     (Z23) Need for vaccination  Comment:   Plan: Tetanus vaccine.    Flu shot declined.     (N40.1,  R39.12) Benign prostatic hyperplasia with weak urinary stream  Comment: likely all from enlarged prostate.    Plan: Check tests for prostate cancer.    Trial tamulosin 0.4mg at bedtime for a month.     (H91.93) Bilateral hearing loss, unspecified hearing loss type  Comment:   Plan: AUDIOLOGY ADULT REFERRAL        Schedule an appointment with audiology to check hearing.     (M25.551) Hip pain, right  Comment: osteoarthritis   Plan: ORTHO  REFERRAL        Schedule an appointment with orthopedic surgeon    (Z12.5) Screening for prostate cancer  Comment:   Plan: PSA, screen          (Z13.6) CARDIOVASCULAR SCREENING; LDL GOAL LESS THAN 130  Comment:   Plan: Blood tests for cholesterol today.

## 2019-12-28 NOTE — RESULT ENCOUNTER NOTE
"Please call.  Uric acid gout test is at a good level.   LDL (\"bad cholesterol\") is high.  This increases risk for cardiovascular disease (heart attacks and strokes).   The HDL \"good cholesterol\" is at a normal level.  Triglycerides, a type of fat found in the bloodstream is high.   PSA test for prostate cancer is abnormal.  Normal is less than 4.   This has not been checked in the past.  It can indicate prostate cancere, prostate enlargement or irritation of the prostate.     PLAN: I recommend recheck PSA in 3-6 months.  IF it increases, I will recommend urology referral.     There is a significant risk for heart attacks and strokes.   I recommend adding a cholesterol lowering medication to lower the chances of heart attacks and strokes.    I suggest starting atorvastatin 20mg daily to lower cholesterol and chances of heart attacks.   If willing to add this medication, we can do prescription for #30 with 11 refills.    Also important for anyone with high cholesterol is weight loss, regular exercise with goal of 30 minutes most days of the week and eating a prudent diet (lots of fruits and vegetables, limiting unhealthy fats and excessive calories).  I recommend fasting lipid profile blood and ALT test in 2-3 months to see how well medication is working.       Please arrange for prescriptions and orders-future labs including PSA    The 10-year ASCVD risk score (Sherman Oakslanre TIM Jr., et al., 2013) is: 11.2%    Values used to calculate the score:      Age: 59 years      Sex: Male      Is Non- : No      Diabetic: No      Tobacco smoker: No      Systolic Blood Pressure: 134 mmHg      Is BP treated: Yes      HDL Cholesterol: 42 mg/dL      Total Cholesterol: 213 mg/dL"

## 2020-01-02 ENCOUNTER — TELEPHONE (OUTPATIENT)
Dept: FAMILY MEDICINE | Facility: CLINIC | Age: 60
End: 2020-01-02

## 2020-01-02 DIAGNOSIS — N40.1 BENIGN PROSTATIC HYPERPLASIA WITH WEAK URINARY STREAM: ICD-10-CM

## 2020-01-02 DIAGNOSIS — R97.20 ELEVATED PROSTATE SPECIFIC ANTIGEN (PSA): Primary | ICD-10-CM

## 2020-01-02 DIAGNOSIS — R39.12 BENIGN PROSTATIC HYPERPLASIA WITH WEAK URINARY STREAM: ICD-10-CM

## 2020-01-02 NOTE — TELEPHONE ENCOUNTER
Shon says he got test results the other day and he had some questions for Dr Sandhu. He says he missed Dr Sandhu's call Monday night. He says he should be around all day if he could call back.[phone]

## 2020-01-02 NOTE — TELEPHONE ENCOUNTER
I spoke with Shon today.    We discussed elevated PSA and possible causes including prostate cancer.  He could see urology or repeat PSA in 3 months.   HE has had BPH symptoms and tried tamulosin for three days but had lightheadedness.    Also we discussed cardiovascular risk and elevated LDL.  He has significant risk and I recommended statin.  He has not been working on diet and weight and would like to trry lifestyle changes first.   PLAN:   Schedule an appointment with urology.  Order placed.   Work on diet, weight and lifestyle changes.   JONATHAN ALLISON MD

## 2020-02-05 ENCOUNTER — OFFICE VISIT (OUTPATIENT)
Dept: UROLOGY | Facility: CLINIC | Age: 60
End: 2020-02-05
Payer: COMMERCIAL

## 2020-02-05 VITALS — SYSTOLIC BLOOD PRESSURE: 148 MMHG | RESPIRATION RATE: 16 BRPM | HEART RATE: 64 BPM | DIASTOLIC BLOOD PRESSURE: 95 MMHG

## 2020-02-05 DIAGNOSIS — N40.1 BENIGN PROSTATIC HYPERPLASIA WITH WEAK URINARY STREAM: Primary | ICD-10-CM

## 2020-02-05 DIAGNOSIS — R39.12 BENIGN PROSTATIC HYPERPLASIA WITH WEAK URINARY STREAM: Primary | ICD-10-CM

## 2020-02-05 PROCEDURE — 99203 OFFICE O/P NEW LOW 30 MIN: CPT | Mod: 25 | Performed by: UROLOGY

## 2020-02-05 PROCEDURE — 87086 URINE CULTURE/COLONY COUNT: CPT | Performed by: UROLOGY

## 2020-02-05 PROCEDURE — 51798 US URINE CAPACITY MEASURE: CPT | Performed by: UROLOGY

## 2020-02-05 RX ORDER — OXYBUTYNIN CHLORIDE 5 MG/1
5 TABLET, EXTENDED RELEASE ORAL DAILY
Qty: 60 TABLET | Refills: 3 | Status: SHIPPED | OUTPATIENT
Start: 2020-02-05 | End: 2022-06-20

## 2020-02-05 NOTE — NURSING NOTE
"Initial BP (!) 148/95 (BP Location: Right arm, Patient Position: Chair, Cuff Size: Adult Regular)   Pulse 64   Resp 16  Estimated body mass index is 34.11 kg/m  as calculated from the following:    Height as of 12/27/19: 1.803 m (5' 11\").    Weight as of 12/27/19: 110.9 kg (244 lb 9.6 oz). .    yasmine soto LPN    "

## 2020-02-06 LAB
BACTERIA SPEC CULT: NO GROWTH
Lab: NORMAL
SPECIMEN SOURCE: NORMAL

## 2020-02-10 ENCOUNTER — HEALTH MAINTENANCE LETTER (OUTPATIENT)
Age: 60
End: 2020-02-10

## 2020-05-04 ENCOUNTER — TRANSFERRED RECORDS (OUTPATIENT)
Dept: HEALTH INFORMATION MANAGEMENT | Facility: CLINIC | Age: 60
End: 2020-05-04

## 2020-05-20 ENCOUNTER — HOSPITAL ENCOUNTER (OUTPATIENT)
Dept: MRI IMAGING | Facility: CLINIC | Age: 60
Discharge: HOME | End: 2020-05-20
Attending: ORTHOPAEDIC SURGERY | Admitting: ORTHOPAEDIC SURGERY
Payer: COMMERCIAL

## 2020-05-20 DIAGNOSIS — M54.10 RADICULITIS OF LEG: ICD-10-CM

## 2020-05-20 PROCEDURE — 72148 MRI LUMBAR SPINE W/O DYE: CPT

## 2020-06-08 NOTE — PROGRESS NOTES
"  SUBJECTIVE:   Shon Sargent is a 58 year old male who presents to clinic today for the following health issues:  Chief Complaint   Patient presents with     Consult     Seen at [facility] diagnosed with lyme.     Ear Problem        Problem 1:   Ear left      Duration: 3 months    Description (location/character/radiation): left ear    Intensity:  mild    Accompanying signs and symptoms: feels like fluid drains out    History (similar episodes/previous evaluation): yes    Precipitating or alleviating factors:     Therapies tried and outcome: Drops    He has had plugging in his left ear.    Treated in [place] 8/23/2018 with ear irrigation for cerumen impaction.     He had otitis media and was treated with Amoxicillin.   9/5 seen in clinic again for perforated TM.    Saw ENT 9/10 for  Continuing ear problems.   Ears - right ear canal is clear. Right TM intact without effusion. Left ear canal with fungal debris. Polypoid inflammation on the lateral surface of the TM with possible middle ear serous effusion. Able to autoinsufflate the left ear well. No perforation noted.      Treated with prednisone ear drops.   Seen again 9/26 and started again on antifungal drops.    Currently no ear pain but some itching and drainage.  No plugging.   Off drops for a month.   Hearing decreased left ear.     Problem 2:   Consult on Lyme Diagnosis  He had a bullseye rash in three areas on right leg.  Noted in the past 2 weeks.   Perhaps mild headache.   No fever, chills.  No aches.  No \"flu like\" symptoms.   He was tested for lyme and he was started on doxycycline 100mg twice daily for 14 days.     Patient Active Problem List   Diagnosis     Hypertension goal BP (blood pressure) < 140/90     Tremor     Gout     ED (erectile dysfunction)     Psoriasis     Esophageal reflux (GERD)     CARDIOVASCULAR SCREENING; LDL GOAL LESS THAN 130     Iron deficiency anemia      No gout on allopurinol.   Not checking blood pressure at home. " "  Infrequent albuteral inhaler use with a bad cold.   Takes omeprazole daily.  Tried going off for a while.     OBJECTIVE:Blood pressure 130/80, pulse 70, temperature 97.1  F (36.2  C), temperature source Tympanic, resp. rate 18, height 5' 11\" (1.803 m), weight 226 lb (102.5 kg). BMI=Body mass index is 31.52 kg/(m^2).  GENERAL APPEARANCE ADULT: Alert, no acute distress  HENT: Ears and TMs normal, oral mucosa and posterior oropharynx normal, ear canal:left appears normal.  Minimal cerumen on TM upper outer portion.      ASSESSMENT:   (A69.20) Lyme disease  (primary encounter diagnosis)  Comment: early lyme with ECM rash.  This should be completely treated with the 14 days of doxycycline.  No additional testing or treatment is needed.  Finish all the pills.   Plan: Recheck with additional problems or concerns.     (H61.92) Disorder of left external ear  Comment: irritation.  No sign of infection at this time.   Plan: Try the prednisone ear drops again for a week,  Several drops in left ear twice daily  This can be used intermittently in the future if needed.        " DISCHARGE

## 2020-07-15 ENCOUNTER — TRANSFERRED RECORDS (OUTPATIENT)
Dept: HEALTH INFORMATION MANAGEMENT | Facility: CLINIC | Age: 60
End: 2020-07-15

## 2020-12-29 ENCOUNTER — TELEPHONE (OUTPATIENT)
Dept: FAMILY MEDICINE | Facility: CLINIC | Age: 60
End: 2020-12-29

## 2020-12-29 DIAGNOSIS — M25.551 HIP PAIN, RIGHT: Primary | ICD-10-CM

## 2020-12-29 NOTE — TELEPHONE ENCOUNTER
Reason for Call:  [facility] Referral - Hip Replacement  Pt was going to [facility] a few times sending pt to [facility] for Injections and this lasted a couple weeks at a time only.  Pt said he was not satisfied how things were going. Pt said he made appt for Consultation with Dr. Vines at [facility] and they recommend him to have a Hip Replacement.   Now Pt is asking Referral for his Ins for this to pay for this.     Phone Number Patient can be reached at: Home number on file [phone] (home)    Best Time: Any Time      Can we leave a detailed message on this number? YES    Call taken on 12/29/2020 at 10:43 AM by Susana Paige

## 2020-12-31 NOTE — TELEPHONE ENCOUNTER
Form received back signed  12/31/20  Faxed Back & Sent to be scanned to this encounter  Susana [place] Sec

## 2021-01-01 DIAGNOSIS — R05.9 COUGH: ICD-10-CM

## 2021-01-05 RX ORDER — ALBUTEROL SULFATE 90 UG/1
AEROSOL, METERED RESPIRATORY (INHALATION)
Qty: 18 G | Refills: 11 | Status: SHIPPED | OUTPATIENT
Start: 2021-01-05 | End: 2022-06-20

## 2021-01-05 NOTE — TELEPHONE ENCOUNTER
Routing refill request to provider for review/approval because: Prescribed for 'cough'  Last OV 12/27/19    Tamar OLVERA, RN, BSN

## 2021-01-19 ENCOUNTER — TELEPHONE (OUTPATIENT)
Dept: FAMILY MEDICINE | Facility: CLINIC | Age: 61
End: 2021-01-19

## 2021-01-19 NOTE — TELEPHONE ENCOUNTER
"  S-(situation): requesting letter or recommendation for patient to receive care at [facility] for hip problems and upcoming hip replacement.     B-(background): patient has been seen by Dr. Sandhu for this issue and was referred to [facility] (referral placed 12/29/20).     A-(assessment): patient states that he was seen at [facility] by Dr. Vines and it was recommended for him to have a hip replacement. Surgery is scheduled for 3/10/21. Patient has [employer] and insurance is now saying that they need a \"letter of recommendation\" for patient to be seen at [facility] in order for insurance to cover at [facility]. Patient is already getting bills from his visit with Dr. Vines and wants to make sure this is taken care of before surgery as this is a large expense.     R-(recommendations): routed to Dr. Sandhu and care team for review. Please call patient at [phone].    Jenae Busby RN      "

## 2021-01-19 NOTE — LETTER
January 20, 2021      Shon Sargent  [address]   [facility] [address]        To Whom It May Concern,      Mr. Shon Sargent has been a patient in our clinic for many years.  He is documented severe osteoarthritis of his right hip.  He has had confirmatory x-rays.  He has failed conservative management including injections.  He has had at least 2 different orthopedic consultations, one on July 15th 2020 through [facility] orthopedics.  He has had a second orthopedic consultation through the [facility] on December 7, 2020.  He has been recommended right total hip arthroplasty.  He chooses to have his surgery done through [facility].  It is my recommendation that he be allowed to have his surgery done with his surgeon and location of choice within the boundaries of his insurance coverage.          Sincerely,        Andrea Sandhu MD

## 2021-01-20 NOTE — TELEPHONE ENCOUNTER
Patient notified letter written and he will pick it up tomorrow.  Placed at  for .  Katiuska Kwong RN

## 2021-01-20 NOTE — TELEPHONE ENCOUNTER
I did write a letter today.    I don't know how his insurance covers [facility],. I have no control over that, so he needs to make sure his insurance will cover consults and surgery [facility].  JONATHAN ALLISON MD

## 2021-01-29 ENCOUNTER — VIRTUAL VISIT (OUTPATIENT)
Dept: FAMILY MEDICINE | Facility: CLINIC | Age: 61
End: 2021-01-29
Payer: COMMERCIAL

## 2021-01-29 DIAGNOSIS — M16.11 OSTEOARTHRITIS OF RIGHT HIP, UNSPECIFIED OSTEOARTHRITIS TYPE: Primary | ICD-10-CM

## 2021-01-29 PROCEDURE — 99213 OFFICE O/P EST LOW 20 MIN: CPT | Mod: 95 | Performed by: FAMILY MEDICINE

## 2021-01-29 NOTE — PROGRESS NOTES
Shon is a 60 year old who is being evaluated via a billable video visit.      How would you like to obtain your AVS? Mail a copy  If the video visit is dropped, the invitation should be resent by: Text to cell phone: [phone]  Will anyone else be joining your video visit? Yes: Juanis his girlfriend. How would they like to receive their invitation? Text to cell phone: [phone] Does not need to send a separate invite.       Unable to get video.  Converted to telephone visit.     ASSESSMENT:   (M16.11) Osteoarthritis of right hip, unspecified osteoarthritis type  (primary encounter diagnosis)  Comment: He has definitely severe right hip osteoarthritis and consultation with 2 orthopedic surgeons were recommended that he have a hip replacement.  He had unsatisfactory visits with his [place] orthopedic orthopedist and had a very good visit with his [facility] consultant orthopedist.  It would be great for him to have surgery at [facility] since he has established relationship and had a consultation there.  I am not sure if insurance will cover it.  There is not a compelling reason medically that he needs to go outside of his network.  Plan: I did complete paperwork and send copy of previous letter.  If his insurance will not allow surgery at [facility], I can do a referral through [facility].  He was interested in [facility] if he needs to go that route.         Subjective     Shon is a 60 year old who presents to clinic today for the following health issues  accompanied by his spouse:  Chief Complaint   Patient presents with     Forms        Provider to fill forms for insurance reasons.   Needs surgery and insurance company will not cover it if it is completed at the [facility].   Pt Needs Dr. Sandhu to write a letter of recommendation to have surgery completed at the [facility] even if it is outside his insurance coverage.     He has right hip osteoarthritis.   Referral to [facility] years ago.   A different referral  last year for [facility].   Saw orthopedics.  Was recommend shots in spine.  This did not help.  Then had hip shot.  Helped 1-2 days.   Seemed to get nowhere after spending a lot of money.    Was told [facility] was in the network for his insurance.   He has [facility].     I had previously sent a letter on 1/20 regarding his hip osteoarthritis and need for surgery. See below.      Vitals:  No vitals were obtained today due to virtual visit.    Physical Exam   GENERAL: Alert and no distress  EYES: Eyes grossly normal to inspection.  No discharge or erythema, or obvious scleral/conjunctival abnormalities.  RESP: No audible wheeze, cough, or visible cyanosis.  No visible retractions or increased work of breathing.    SKIN: Visible skin clear. No significant rash, abnormal pigmentation or lesions.  NEURO: Cranial nerves grossly intact.  Mentation and speech appropriate for age.  PSYCH: Mentation appears normal, affect normal/bright, judgement and insight intact, normal speech and appearance well-groomed.      Telephone visit: 18 minutes.     =================================  1/20/2021 letter:  To Whom It May Concern,      Mr. Shon Sargent has been a patient in our clinic for many years.  He is documented severe osteoarthritis of his right hip.  He has had confirmatory x-rays.  He has failed conservative management including injections.  He has had at least 2 different orthopedic consultations, one on July 15th 2020 through [facility] orthopedics.  He has had a second orthopedic consultation through the [facility] on December 7, 2020.  He has been recommended right total hip arthroplasty.  He chooses to have his surgery done through [facility].  It is my recommendation that he be allowed to have his surgery done with his surgeon and location of choice within the boundaries of his insurance coverage.

## 2021-02-01 ENCOUNTER — TELEPHONE (OUTPATIENT)
Dept: FAMILY MEDICINE | Facility: CLINIC | Age: 61
End: 2021-02-01

## 2021-02-01 DIAGNOSIS — M16.11 OSTEOARTHRITIS OF RIGHT HIP, UNSPECIFIED OSTEOARTHRITIS TYPE: Primary | ICD-10-CM

## 2021-02-01 NOTE — TELEPHONE ENCOUNTER
Patient notified referral placed for [facility] orthopaedics.  Order per Dr Sandhu.   Katiuska Kwong RN

## 2021-02-07 ENCOUNTER — TELEPHONE (OUTPATIENT)
Dept: FAMILY MEDICINE | Facility: CLINIC | Age: 61
End: 2021-02-07

## 2021-02-07 NOTE — TELEPHONE ENCOUNTER
[facility] - General Prior auth Request Form - regarding Total Hip replacement   Form received back signed  2/2/21  Faxed Back & Sent to be scanned to this encounter  Susana [facility]

## 2021-03-19 DIAGNOSIS — K21.9 GASTROESOPHAGEAL REFLUX DISEASE WITHOUT ESOPHAGITIS: ICD-10-CM

## 2021-03-19 DIAGNOSIS — M1A.9XX0 CHRONIC GOUT INVOLVING TOE WITHOUT TOPHUS, UNSPECIFIED CAUSE, UNSPECIFIED LATERALITY: ICD-10-CM

## 2021-03-19 DIAGNOSIS — I10 ESSENTIAL HYPERTENSION WITH GOAL BLOOD PRESSURE LESS THAN 140/90: ICD-10-CM

## 2021-03-22 RX ORDER — LISINOPRIL 20 MG/1
TABLET ORAL
Qty: 90 TABLET | Refills: 0 | Status: SHIPPED | OUTPATIENT
Start: 2021-03-22 | End: 2021-06-23

## 2021-03-22 RX ORDER — ALLOPURINOL 300 MG/1
TABLET ORAL
Qty: 90 TABLET | Refills: 0 | Status: SHIPPED | OUTPATIENT
Start: 2021-03-22 | End: 2021-06-23

## 2021-03-22 NOTE — TELEPHONE ENCOUNTER
"  Requested Prescriptions   Pending Prescriptions Disp Refills     omeprazole (PRILOSEC) 20 MG DR capsule [Pharmacy Med Name: Omeprazole 20 MG Oral Capsule Delayed Release] 90 capsule 0     Sig: Take 1 capsule by mouth once daily       PPI Protocol Passed - 3/19/2021  8:02 AM        Passed - Not on Clopidogrel (unless Pantoprazole ordered)        Passed - No diagnosis of osteoporosis on record        Passed - Recent (12 mo) or future (30 days) visit within the authorizing provider's specialty     Patient has had an office visit with the authorizing provider or a provider within the authorizing providers department within the previous 12 mos or has a future within next 30 days. See \"Patient Info\" tab in inbasket, or \"Choose Columns\" in Meds & Orders section of the refill encounter.              Passed - Medication is active on med list        Passed - Patient is age 18 or older           allopurinol (ZYLOPRIM) 300 MG tablet [Pharmacy Med Name: Allopurinol 300 MG Oral Tablet] 90 tablet 0     Sig: Take 1 tablet by mouth once daily       Gout Agents Protocol Failed - 3/19/2021  8:02 AM        Failed - CBC on file in past 12 months     Recent Labs   Lab Test 09/02/19 0619   WBC 7.8   RBC 4.76   HGB 13.8   HCT 42.8                    Failed - ALT on file in past 12 months     Recent Labs   Lab Test 09/02/19 0619   ALT 25             Failed - Has Uric Acid on file in past 12 months and value is less than 6     Recent Labs   Lab Test 12/27/19  0952   URIC 4.7     If level is 6mg/dL or greater, ok to refill one time and refer to provider.           Failed - Normal serum creatinine on file in the past 12 months     Recent Labs   Lab Test 09/02/19 0619   CR 0.85       Ok to refill medication if creatinine is low          Passed - Recent (12 mo) or future (30 days) visit within the authorizing provider's specialty     Patient has had an office visit with the authorizing provider or a provider within the authorizing " "providers department within the previous 12 mos or has a future within next 30 days. See \"Patient Info\" tab in inbasket, or \"Choose Columns\" in Meds & Orders section of the refill encounter.              Passed - Medication is active on med list        Passed - Patient is age 18 or older           lisinopril (ZESTRIL) 20 MG tablet [Pharmacy Med Name: Lisinopril 20 MG Oral Tablet] 90 tablet 0     Sig: Take 1 tablet by mouth once daily       ACE Inhibitors (Including Combos) Protocol Failed - 3/19/2021  8:02 AM        Failed - Blood pressure under 140/90 in past 12 months     BP Readings from Last 3 Encounters:   02/05/20 (!) 148/95   12/27/19 134/82   09/02/19 (!) 144/94                 Failed - Normal serum creatinine on file in past 12 months     Recent Labs   Lab Test 09/02/19  0619   CR 0.85       Ok to refill medication if creatinine is low          Failed - Normal serum potassium on file in past 12 months     Recent Labs   Lab Test 09/02/19  0619   POTASSIUM 4.5             Passed - Recent (12 mo) or future (30 days) visit within the authorizing provider's specialty     Patient has had an office visit with the authorizing provider or a provider within the authorizing providers department within the previous 12 mos or has a future within next 30 days. See \"Patient Info\" tab in inbasket, or \"Choose Columns\" in Meds & Orders section of the refill encounter.              Passed - Medication is active on med list        Passed - Patient is age 18 or older           Prescription for omeprazole approved per [facility] Refill Protocol.    Tamar OLVREA RN, BSN        "

## 2021-05-24 DIAGNOSIS — N52.9 ERECTILE DYSFUNCTION, UNSPECIFIED ERECTILE DYSFUNCTION TYPE: ICD-10-CM

## 2021-05-25 RX ORDER — SILDENAFIL CITRATE 20 MG/1
TABLET ORAL
Qty: 30 TABLET | Refills: 3 | Status: SHIPPED | OUTPATIENT
Start: 2021-05-25 | End: 2022-06-20

## 2021-06-22 DIAGNOSIS — I10 ESSENTIAL HYPERTENSION WITH GOAL BLOOD PRESSURE LESS THAN 140/90: ICD-10-CM

## 2021-06-22 DIAGNOSIS — K21.9 GASTROESOPHAGEAL REFLUX DISEASE WITHOUT ESOPHAGITIS: ICD-10-CM

## 2021-06-22 DIAGNOSIS — M1A.9XX0 CHRONIC GOUT INVOLVING TOE WITHOUT TOPHUS, UNSPECIFIED CAUSE, UNSPECIFIED LATERALITY: ICD-10-CM

## 2021-06-23 RX ORDER — ALLOPURINOL 300 MG/1
TABLET ORAL
Qty: 90 TABLET | Refills: 0 | Status: SHIPPED | OUTPATIENT
Start: 2021-06-23 | End: 2021-07-29

## 2021-06-23 RX ORDER — LISINOPRIL 20 MG/1
TABLET ORAL
Qty: 90 TABLET | Refills: 0 | Status: SHIPPED | OUTPATIENT
Start: 2021-06-23 | End: 2021-09-29

## 2021-06-23 NOTE — TELEPHONE ENCOUNTER
BP Readings from Last 6 Encounters:   02/05/20 (!) 148/95   12/27/19 134/82   09/02/19 (!) 144/94   06/11/19 121/84   06/11/19 120/86   11/08/18 130/80     Uric Acid   Date Value Ref Range Status   12/27/2019 4.7 3.5 - 7.2 mg/dL Final     Virtual visit 1/29/21 with Dr Sandhu.  Last physical 12/27/19  Routing refill request to provider for review/approval because:  BP high on 2/05/20    Katiuska Kwong RN      
No

## 2021-07-27 DIAGNOSIS — M1A.9XX0 CHRONIC GOUT INVOLVING TOE WITHOUT TOPHUS, UNSPECIFIED CAUSE, UNSPECIFIED LATERALITY: ICD-10-CM

## 2021-07-29 RX ORDER — ALLOPURINOL 300 MG/1
TABLET ORAL
Qty: 90 TABLET | Refills: 0 | Status: SHIPPED | OUTPATIENT
Start: 2021-07-29 | End: 2021-12-27

## 2021-07-30 NOTE — TELEPHONE ENCOUNTER
Pending Prescriptions:                       Disp   Refills    allopurinol (ZYLOPRIM) 300 MG tablet [Phar*90 tab*0        Sig: Take 1 tablet by mouth once daily    Routing refill request to provider for review/approval because:  Labs not current:  per protocol    Amy Masters RN

## 2021-09-24 DIAGNOSIS — K21.9 GASTROESOPHAGEAL REFLUX DISEASE WITHOUT ESOPHAGITIS: ICD-10-CM

## 2021-09-24 DIAGNOSIS — I10 ESSENTIAL HYPERTENSION WITH GOAL BLOOD PRESSURE LESS THAN 140/90: ICD-10-CM

## 2021-09-29 RX ORDER — LISINOPRIL 20 MG/1
TABLET ORAL
Qty: 90 TABLET | Refills: 0 | Status: SHIPPED | OUTPATIENT
Start: 2021-09-29 | End: 2021-12-27

## 2021-09-29 NOTE — TELEPHONE ENCOUNTER
Routing refill request to provider for review/approval because:  B/P not at goal      02/05/20 (!) 148/95   12/27/19 134/82   09/02/19 (!) 144/94     Labs not current.    Cr, Potassium not current.      BIRGIT Elizabeth

## 2021-11-23 ENCOUNTER — ALLIED HEALTH/NURSE VISIT (OUTPATIENT)
Dept: FAMILY MEDICINE | Facility: CLINIC | Age: 61
End: 2021-11-23
Payer: COMMERCIAL

## 2021-11-23 ENCOUNTER — VIRTUAL VISIT (OUTPATIENT)
Dept: FAMILY MEDICINE | Facility: CLINIC | Age: 61
End: 2021-11-23
Payer: COMMERCIAL

## 2021-11-23 DIAGNOSIS — R05.9 COUGH: ICD-10-CM

## 2021-11-23 DIAGNOSIS — R05.9 COUGH: Primary | ICD-10-CM

## 2021-11-23 PROCEDURE — 99213 OFFICE O/P EST LOW 20 MIN: CPT | Mod: 95 | Performed by: FAMILY MEDICINE

## 2021-11-23 PROCEDURE — U0003 INFECTIOUS AGENT DETECTION BY NUCLEIC ACID (DNA OR RNA); SEVERE ACUTE RESPIRATORY SYNDROME CORONAVIRUS 2 (SARS-COV-2) (CORONAVIRUS DISEASE [COVID-19]), AMPLIFIED PROBE TECHNIQUE, MAKING USE OF HIGH THROUGHPUT TECHNOLOGIES AS DESCRIBED BY CMS-2020-01-R: HCPCS

## 2021-11-23 PROCEDURE — 99207 PR NO CHARGE NURSE ONLY: CPT

## 2021-11-23 PROCEDURE — U0005 INFEC AGEN DETEC AMPLI PROBE: HCPCS

## 2021-11-23 NOTE — PROGRESS NOTES
Shon is a 61 year old who is being evaluated via a billable video visit.      How would you like to obtain your AVS? MyChart  If the video visit is dropped, the invitation should be resent by: Text to cell phone: [phone]   Will anyone else be joining your video visit? No    Video Start Time: 7:08 AM    Assessment & Plan     Cough  Suspect covid   - Symptomatic COVID-19 Virus (Coronavirus) by PCR; Future                 Return in about 1 week (around 11/30/2021), or if symptoms worsen or fail to improve.    Yudy Pollock MD  [facility]    Subjective   Shon is a 61 year old who presents for the following health issues     HPI       Concern for COVID-19  About how many days ago did these symptoms start? 5 days  Is this your first visit for this illness? Yes  In the 14 days before your symptoms started, have you had close contact with someone with COVID-19 (Coronavirus)? Yes, I have been in contact with someone who has COVID-19/Coronavirus (confirmed by lab test).  Do you have a fever or chills? ???  Are you having new or worsening difficulty breathing? This weekend but today is better  Do you have new or worsening cough? Both wet and dry  Have you had any new or unexplained body aches? YES    Have you experienced any of the following NEW symptoms?    Headache: YES    Sore throat: No    Loss of taste or smell: YES noted yesterday     Chest pain: No    Diarrhea: No    Rash: No  What treatments have you tried? advil cold and sinus   Vit b12 zinc vit c Albuterol   Who do you live with? Girlfriend and three of her children   Are you, or a household member, a healthcare worker or a ? No  Do you live in a nursing home, group home, or shelter? No  Do you have a way to get food/medications if quarantined? Yes, I have a friend or family member who can help me.              Review of Systems   Constitutional, HEENT, cardiovascular, pulmonary, gi and gu systems are negative,  except as otherwise noted.      Objective           Vitals:  No vitals were obtained today due to virtual visit.    Physical Exam   GENERAL: Healthy, alert and no distress  EYES: Eyes grossly normal to inspection.  No discharge or erythema, or obvious scleral/conjunctival abnormalities.  RESP: No audible wheeze, cough, or visible cyanosis.  No visible retractions or increased work of breathing.    SKIN: Visible skin clear. No significant rash, abnormal pigmentation or lesions.  NEURO: Cranial nerves grossly intact.  Mentation and speech appropriate for age.  PSYCH: Mentation appears normal, affect normal/bright, judgement and insight intact, normal speech and appearance well-groomed.                Video-Visit Details    Type of service:  Video Visit    Video End Time:7:31 AM    Originating Location (pt. Location): Home    Distant Location (provider location):  [facility]     Platform used for Video Visit: Geeta

## 2021-11-23 NOTE — PATIENT INSTRUCTIONS
"Discharge Instructions for COVID-19 Patients  You have--or may have--COVID-19. Please follow the instructions listed below.   If you have a weakened immune system, discuss with your doctor any other actions you need to take.  How can I protect others?  If you have symptoms (fever, cough, body aches or trouble breathing):    Stay home and away from others (self-isolate) until:  ? Your other symptoms have resolved (gotten better). And   ? You've had no fever--and no medicine that reduces fever--for 1 full day (24 hours). And   ? At least 10 days have passed since your symptoms started. (You may need to wait 20 days. Follow the advice of your care team.)  If you don't show symptoms, but testing showed that you have COVID-19:    Stay home and away from others (self-isolate) until at least 10 days have passed since the date of your first positive COVID-19 test.  During this time    Stay in your own room, even for meals. Use your own bathroom if you can.    Stay away from others in your home. No hugging, kissing or shaking hands. No visitors.    Don't go to work, school or anywhere else.    Clean \"high touch\" surfaces often (doorknobs, counters, handles). Use household cleaning spray or wipes.    You'll find a full list of  on the EPA website: www.epa.gov/pesticide-registration/list-n-disinfectants-use-against-sars-cov-2.    Cover your mouth and nose with a mask or other face covering to avoid spreading germs.    Wash your hands and face often. Use soap and water.    Caregivers in these groups are at risk for severe illness due to COVID-19:  ? People 65 years and older  ? People who live in a nursing home or long-term care facility  ? People with chronic disease (lung, heart, cancer, diabetes, kidney, liver, immunologic)  ? People who have a weakened immune system, including those who:    Are in cancer treatment    Take medicine that weakens the immune system, such as corticosteroids    Had a bone marrow or organ " transplant    Have an immune deficiency    Have poorly controlled HIV or AIDS    Are obese (body mass index of 40 or higher)    Smoke regularly    Caregivers should wear gloves while washing dishes, handling laundry and cleaning bedrooms and bathrooms.    Use caution when washing and drying laundry: Don't shake dirty laundry and use the warmest water setting that you can.    For more tips on managing your health at home, go to www.cdc.gov/coronavirus/2019-ncov/downloads/10Things.pdf.  How can I take care of myself at home?  1. Get lots of rest. Drink extra fluids (unless a doctor has told you not to).  2. Take Tylenol (acetaminophen) for fever or pain. If you have liver or kidney problems, ask your family doctor if it's okay to take Tylenol.   Adults can take either:   ? 650 mg (two 325 mg pills) every 4 to 6 hours, or   ? 1,000 mg (two 500 mg pills) every 8 hours as needed.  ? Note: Don't take more than 3,000 mg in one day. Acetaminophen is found in many medicines (both prescribed and over-the-counter medicines). Read all labels to be sure you don't take too much.   For children, check the Tylenol bottle for the right dose. The dose is based on the child's age or weight.  3. If you have other health problems (like cancer, heart failure, an organ transplant or severe kidney disease): Call your specialty clinic if you don't feel better in the next 2 days.  4. Know when to call 911. Emergency warning signs include:  ? Trouble breathing or shortness of breath  ? Pain or pressure in the chest that doesn't go away  ? Feeling confused like you haven't felt before, or not being able to wake up  ? Bluish-colored lips or face  5. Your doctor may have prescribed a blood thinner medicine. Follow their instructions.  Where can I get more information?     [employer] [place] - About COVID-19:   [email or web address]    CDC - What to Do If You're Sick:  www.cdc.gov/coronavirus/2019-ncov/about/steps-when-sick.html    CDC - Ending Home Isolation: www.cdc.gov/coronavirus/2019-ncov/hcp/disposition-in-home-patients.html    CDC - Caring for Someone: www.cdc.gov/coronavirus/2019-ncov/if-you-are-sick/care-for-someone.html    [facility] - Interim Guidance for Hospital Discharge to Home: www.health.[facility].mn./diseases/coronavirus/hcp/hospdischarge.pdf    Below are the COVID-19 hotlines at the [place] Department of Health ([facility]). Interpreters are available.  ? For health questions: Call 181-466-1457 or 1-584.286.1925 (7 a.m. to 7 p.m.)  ? For questions about schools and childcare: Call 767-054-0953 or 1-583.222.9645 (7 a.m. to 7 p.m.)    For informational purposes only. Not to replace the advice of your health care provider. Clinically reviewed by Dr. Michael Morales.   Copyright   2020 [facility]. All rights reserved. [employer] 521187 - REV 01/05/21.

## 2021-11-24 LAB — SARS-COV-2 RNA RESP QL NAA+PROBE: POSITIVE

## 2021-12-27 DIAGNOSIS — K21.9 GASTROESOPHAGEAL REFLUX DISEASE WITHOUT ESOPHAGITIS: ICD-10-CM

## 2021-12-27 DIAGNOSIS — M1A.9XX0 CHRONIC GOUT INVOLVING TOE WITHOUT TOPHUS, UNSPECIFIED CAUSE, UNSPECIFIED LATERALITY: ICD-10-CM

## 2021-12-27 DIAGNOSIS — I10 ESSENTIAL HYPERTENSION WITH GOAL BLOOD PRESSURE LESS THAN 140/90: ICD-10-CM

## 2021-12-27 RX ORDER — ALLOPURINOL 300 MG/1
TABLET ORAL
Qty: 90 TABLET | Refills: 0 | Status: SHIPPED | OUTPATIENT
Start: 2021-12-27 | End: 2022-03-23

## 2021-12-27 RX ORDER — LISINOPRIL 20 MG/1
TABLET ORAL
Qty: 90 TABLET | Refills: 0 | Status: SHIPPED | OUTPATIENT
Start: 2021-12-27 | End: 2022-03-23

## 2021-12-27 NOTE — TELEPHONE ENCOUNTER
"Requested Prescriptions   Pending Prescriptions Disp Refills     lisinopril (ZESTRIL) 20 MG tablet [Pharmacy Med Name: Lisinopril 20 MG Oral Tablet] 90 tablet 0     Sig: Take 1 tablet by mouth once daily       ACE Inhibitors (Including Combos) Protocol Failed - 12/27/2021  9:37 AM        Failed - Blood pressure under 140/90 in past 12 months     BP Readings from Last 3 Encounters:   02/05/20 (!) 148/95   12/27/19 134/82   09/02/19 (!) 144/94                 Failed - Normal serum creatinine on file in past 12 months     Recent Labs   Lab Test 09/02/19  0619   CR 0.85       Ok to refill medication if creatinine is low          Failed - Normal serum potassium on file in past 12 months     Recent Labs   Lab Test 09/02/19  0619   POTASSIUM 4.5             Passed - Recent (12 mo) or future (30 days) visit within the authorizing provider's specialty     Patient has had an office visit with the authorizing provider or a provider within the authorizing providers department within the previous 12 mos or has a future within next 30 days. See \"Patient Info\" tab in inbasket, or \"Choose Columns\" in Meds & Orders section of the refill encounter.              Passed - Medication is active on med list        Passed - Patient is age 18 or older           omeprazole (PRILOSEC) 20 MG DR capsule [Pharmacy Med Name: Omeprazole 20 MG Oral Capsule Delayed Release] 90 capsule 0     Sig: Take 1 capsule by mouth once daily       PPI Protocol Passed - 12/27/2021  9:37 AM        Passed - Not on Clopidogrel (unless Pantoprazole ordered)        Passed - No diagnosis of osteoporosis on record        Passed - Recent (12 mo) or future (30 days) visit within the authorizing provider's specialty     Patient has had an office visit with the authorizing provider or a provider within the authorizing providers department within the previous 12 mos or has a future within next 30 days. See \"Patient Info\" tab in inbasket, or \"Choose Columns\" in Meds & Orders " "section of the refill encounter.              Passed - Medication is active on med list        Passed - Patient is age 18 or older           allopurinol (ZYLOPRIM) 300 MG tablet [Pharmacy Med Name: Allopurinol 300 MG Oral Tablet] 90 tablet 0     Sig: Take 1 tablet by mouth once daily       Gout Agents Protocol Failed - 12/27/2021  9:37 AM        Failed - CBC on file in past 12 months     Recent Labs   Lab Test 09/02/19 0619   WBC 7.8   RBC 4.76   HGB 13.8   HCT 42.8                    Failed - ALT on file in past 12 months     Recent Labs   Lab Test 09/02/19 0619   ALT 25             Failed - Has Uric Acid on file in past 12 months and value is less than 6     Recent Labs   Lab Test 12/27/19 0952   URIC 4.7     If level is 6mg/dL or greater, ok to refill one time and refer to provider.           Failed - Normal serum creatinine on file in the past 12 months     Recent Labs   Lab Test 09/02/19 0619   CR 0.85       Ok to refill medication if creatinine is low          Passed - Recent (12 mo) or future (30 days) visit within the authorizing provider's specialty     Patient has had an office visit with the authorizing provider or a provider within the authorizing providers department within the previous 12 mos or has a future within next 30 days. See \"Patient Info\" tab in inbasket, or \"Choose Columns\" in Meds & Orders section of the refill encounter.              Passed - Medication is active on med list        Passed - Patient is age 18 or older             "

## 2022-01-02 ENCOUNTER — HEALTH MAINTENANCE LETTER (OUTPATIENT)
Age: 62
End: 2022-01-02

## 2022-03-23 DIAGNOSIS — I10 ESSENTIAL HYPERTENSION WITH GOAL BLOOD PRESSURE LESS THAN 140/90: ICD-10-CM

## 2022-03-23 DIAGNOSIS — M1A.9XX0 CHRONIC GOUT INVOLVING TOE WITHOUT TOPHUS, UNSPECIFIED CAUSE, UNSPECIFIED LATERALITY: ICD-10-CM

## 2022-03-23 DIAGNOSIS — K21.9 GASTROESOPHAGEAL REFLUX DISEASE WITHOUT ESOPHAGITIS: ICD-10-CM

## 2022-03-23 RX ORDER — ALLOPURINOL 300 MG/1
TABLET ORAL
Qty: 90 TABLET | Refills: 0 | Status: SHIPPED | OUTPATIENT
Start: 2022-03-23 | End: 2022-06-20

## 2022-03-23 RX ORDER — LISINOPRIL 20 MG/1
TABLET ORAL
Qty: 90 TABLET | Refills: 0 | Status: SHIPPED | OUTPATIENT
Start: 2022-03-23 | End: 2022-06-20

## 2022-03-23 NOTE — TELEPHONE ENCOUNTER
"Requested Prescriptions   Pending Prescriptions Disp Refills     allopurinol (ZYLOPRIM) 300 MG tablet [Pharmacy Med Name: Allopurinol 300 MG Oral Tablet] 90 tablet 0     Sig: Take 1 tablet by mouth once daily       Gout Agents Protocol Failed - 3/23/2022  6:40 AM        Failed - CBC on file in past 12 months     Recent Labs   Lab Test 09/02/19 0619   WBC 7.8   RBC 4.76   HGB 13.8   HCT 42.8                    Failed - ALT on file in past 12 months     Recent Labs   Lab Test 09/02/19 0619   ALT 25             Failed - Has Uric Acid on file in past 12 months and value is less than 6     Recent Labs   Lab Test 12/27/19 0952   URIC 4.7     If level is 6mg/dL or greater, ok to refill one time and refer to provider.           Failed - Normal serum creatinine on file in the past 12 months     Recent Labs   Lab Test 09/02/19 0619   CR 0.85       Ok to refill medication if creatinine is low          Passed - Recent (12 mo) or future (30 days) visit within the authorizing provider's specialty     Patient has had an office visit with the authorizing provider or a provider within the authorizing providers department within the previous 12 mos or has a future within next 30 days. See \"Patient Info\" tab in inbasket, or \"Choose Columns\" in Meds & Orders section of the refill encounter.              Passed - Medication is active on med list        Passed - Patient is age 18 or older           omeprazole (PRILOSEC) 20 MG DR capsule [Pharmacy Med Name: Omeprazole 20 MG Oral Capsule Delayed Release] 90 capsule 0     Sig: Take 1 capsule by mouth once daily       PPI Protocol Passed - 3/23/2022  6:40 AM        Passed - Not on Clopidogrel (unless Pantoprazole ordered)        Passed - No diagnosis of osteoporosis on record        Passed - Recent (12 mo) or future (30 days) visit within the authorizing provider's specialty     Patient has had an office visit with the authorizing provider or a provider within the authorizing " "providers department within the previous 12 mos or has a future within next 30 days. See \"Patient Info\" tab in inbasket, or \"Choose Columns\" in Meds & Orders section of the refill encounter.              Passed - Medication is active on med list        Passed - Patient is age 18 or older           lisinopril (ZESTRIL) 20 MG tablet [Pharmacy Med Name: Lisinopril 20 MG Oral Tablet] 90 tablet 0     Sig: Take 1 tablet by mouth once daily       ACE Inhibitors (Including Combos) Protocol Failed - 3/23/2022  6:40 AM        Failed - Blood pressure under 140/90 in past 12 months     BP Readings from Last 3 Encounters:   02/05/20 (!) 148/95   12/27/19 134/82   09/02/19 (!) 144/94                 Failed - Normal serum creatinine on file in past 12 months     Recent Labs   Lab Test 09/02/19  0619   CR 0.85       Ok to refill medication if creatinine is low          Failed - Normal serum potassium on file in past 12 months     Recent Labs   Lab Test 09/02/19  0619   POTASSIUM 4.5             Passed - Recent (12 mo) or future (30 days) visit within the authorizing provider's specialty     Patient has had an office visit with the authorizing provider or a provider within the authorizing providers department within the previous 12 mos or has a future within next 30 days. See \"Patient Info\" tab in inbasket, or \"Choose Columns\" in Meds & Orders section of the refill encounter.              Passed - Medication is active on med list        Passed - Patient is age 18 or older             "

## 2022-06-20 ENCOUNTER — TELEPHONE (OUTPATIENT)
Dept: FAMILY MEDICINE | Facility: CLINIC | Age: 62
End: 2022-06-20

## 2022-06-20 ENCOUNTER — OFFICE VISIT (OUTPATIENT)
Dept: FAMILY MEDICINE | Facility: CLINIC | Age: 62
End: 2022-06-20
Payer: COMMERCIAL

## 2022-06-20 VITALS
OXYGEN SATURATION: 98 % | BODY MASS INDEX: 34.93 KG/M2 | DIASTOLIC BLOOD PRESSURE: 80 MMHG | RESPIRATION RATE: 16 BRPM | TEMPERATURE: 98.1 F | WEIGHT: 244 LBS | HEART RATE: 82 BPM | HEIGHT: 70 IN | SYSTOLIC BLOOD PRESSURE: 124 MMHG

## 2022-06-20 DIAGNOSIS — Z13.6 CARDIOVASCULAR SCREENING; LDL GOAL LESS THAN 130: ICD-10-CM

## 2022-06-20 DIAGNOSIS — Z12.5 SCREENING FOR PROSTATE CANCER: ICD-10-CM

## 2022-06-20 DIAGNOSIS — K21.9 GASTROESOPHAGEAL REFLUX DISEASE WITHOUT ESOPHAGITIS: ICD-10-CM

## 2022-06-20 DIAGNOSIS — M1A.9XX0 CHRONIC GOUT INVOLVING TOE WITHOUT TOPHUS, UNSPECIFIED CAUSE, UNSPECIFIED LATERALITY: ICD-10-CM

## 2022-06-20 DIAGNOSIS — I10 ESSENTIAL HYPERTENSION WITH GOAL BLOOD PRESSURE LESS THAN 140/90: ICD-10-CM

## 2022-06-20 DIAGNOSIS — N52.9 ERECTILE DYSFUNCTION, UNSPECIFIED ERECTILE DYSFUNCTION TYPE: ICD-10-CM

## 2022-06-20 DIAGNOSIS — R05.9 COUGH: ICD-10-CM

## 2022-06-20 DIAGNOSIS — Z00.00 PREVENTATIVE HEALTH CARE: Primary | ICD-10-CM

## 2022-06-20 LAB
ALBUMIN SERPL-MCNC: 3.7 G/DL (ref 3.4–5)
ALP SERPL-CCNC: 102 U/L (ref 40–150)
ALT SERPL W P-5'-P-CCNC: 23 U/L (ref 0–70)
ANION GAP SERPL CALCULATED.3IONS-SCNC: 6 MMOL/L (ref 3–14)
AST SERPL W P-5'-P-CCNC: 16 U/L (ref 0–45)
BILIRUB SERPL-MCNC: 0.5 MG/DL (ref 0.2–1.3)
BUN SERPL-MCNC: 16 MG/DL (ref 7–30)
CALCIUM SERPL-MCNC: 8.7 MG/DL (ref 8.5–10.1)
CHLORIDE BLD-SCNC: 108 MMOL/L (ref 94–109)
CHOLEST SERPL-MCNC: 165 MG/DL
CO2 SERPL-SCNC: 24 MMOL/L (ref 20–32)
CREAT SERPL-MCNC: 0.88 MG/DL (ref 0.66–1.25)
ERYTHROCYTE [DISTWIDTH] IN BLOOD BY AUTOMATED COUNT: 15.4 % (ref 10–15)
FASTING STATUS PATIENT QL REPORTED: NO
GFR SERPL CREATININE-BSD FRML MDRD: >90 ML/MIN/1.73M2
GLUCOSE BLD-MCNC: 96 MG/DL (ref 70–99)
HCT VFR BLD AUTO: 42.3 % (ref 40–53)
HDLC SERPL-MCNC: 35 MG/DL
HGB BLD-MCNC: 13.1 G/DL (ref 13.3–17.7)
LDLC SERPL CALC-MCNC: 83 MG/DL
MCH RBC QN AUTO: 25.9 PG (ref 26.5–33)
MCHC RBC AUTO-ENTMCNC: 31 G/DL (ref 31.5–36.5)
MCV RBC AUTO: 84 FL (ref 78–100)
NONHDLC SERPL-MCNC: 130 MG/DL
PLATELET # BLD AUTO: 215 10E3/UL (ref 150–450)
POTASSIUM BLD-SCNC: 3.9 MMOL/L (ref 3.4–5.3)
PROT SERPL-MCNC: 7.2 G/DL (ref 6.8–8.8)
PSA SERPL-MCNC: 4.58 UG/L (ref 0–4)
RBC # BLD AUTO: 5.06 10E6/UL (ref 4.4–5.9)
SODIUM SERPL-SCNC: 138 MMOL/L (ref 133–144)
TRIGL SERPL-MCNC: 237 MG/DL
WBC # BLD AUTO: 7.5 10E3/UL (ref 4–11)

## 2022-06-20 PROCEDURE — 80053 COMPREHEN METABOLIC PANEL: CPT | Performed by: FAMILY MEDICINE

## 2022-06-20 PROCEDURE — 85027 COMPLETE CBC AUTOMATED: CPT | Performed by: FAMILY MEDICINE

## 2022-06-20 PROCEDURE — G0103 PSA SCREENING: HCPCS | Performed by: FAMILY MEDICINE

## 2022-06-20 PROCEDURE — 99396 PREV VISIT EST AGE 40-64: CPT | Performed by: FAMILY MEDICINE

## 2022-06-20 PROCEDURE — 80061 LIPID PANEL: CPT | Performed by: FAMILY MEDICINE

## 2022-06-20 PROCEDURE — 36415 COLL VENOUS BLD VENIPUNCTURE: CPT | Performed by: FAMILY MEDICINE

## 2022-06-20 RX ORDER — ALBUTEROL SULFATE 90 UG/1
2 AEROSOL, METERED RESPIRATORY (INHALATION) EVERY 4 HOURS PRN
Qty: 18 G | Refills: 11 | Status: SHIPPED | OUTPATIENT
Start: 2022-06-20 | End: 2022-10-14

## 2022-06-20 RX ORDER — SILDENAFIL CITRATE 20 MG/1
TABLET ORAL
Qty: 30 TABLET | Refills: 3 | Status: SHIPPED | OUTPATIENT
Start: 2022-06-20 | End: 2022-10-14

## 2022-06-20 RX ORDER — LISINOPRIL 20 MG/1
20 TABLET ORAL DAILY
Qty: 90 TABLET | Refills: 3 | Status: SHIPPED | OUTPATIENT
Start: 2022-06-20 | End: 2023-06-29

## 2022-06-20 RX ORDER — ALLOPURINOL 300 MG/1
1 TABLET ORAL DAILY
Qty: 90 TABLET | Refills: 3 | Status: SHIPPED | OUTPATIENT
Start: 2022-06-20 | End: 2023-06-29

## 2022-06-20 ASSESSMENT — ENCOUNTER SYMPTOMS
HEARTBURN: 0
MYALGIAS: 0
PALPITATIONS: 0
NAUSEA: 0
FREQUENCY: 1
PARESTHESIAS: 0
CHILLS: 0
DYSURIA: 0
JOINT SWELLING: 0
FEVER: 0
HEMATURIA: 0
CONSTIPATION: 0
DIZZINESS: 0
ABDOMINAL PAIN: 0
HEADACHES: 0
SORE THROAT: 0
WEAKNESS: 0
COUGH: 0
DIARRHEA: 0
EYE PAIN: 0
NERVOUS/ANXIOUS: 0
ARTHRALGIAS: 1
HEMATOCHEZIA: 0
SHORTNESS OF BREATH: 0

## 2022-06-20 ASSESSMENT — PAIN SCALES - GENERAL: PAINLEVEL: NO PAIN (0)
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
## 2022-06-20 NOTE — TELEPHONE ENCOUNTER
Central Prior Authorization Team   Phone: [phone]      EPA denial - waiting for insurance to fax denial letter.

## 2022-06-20 NOTE — PATIENT INSTRUCTIONS
ASSESSMENT/PLAN:                                                    Lifestyle recommendations:regular exercise, at least 150 minutes per week (average 30 minutes 5 times a week)  weight loss recommended (ideal BMI-body mass index is <25)  The following exams/tests were recommended and discussed for health maintenance:  Colon cancer screening recommended in 2025  Prostate cancer screening-PSA test due for follow-up.     (Z00.00) Preventative health care  (primary encounter diagnosis)    (M1A.9XX0) Chronic gout involving toe without tophus, unspecified cause, unspecified laterality  Comment: doing well on allopurinol.   Plan: allopurinol (ZYLOPRIM) 300 MG tablet, CBC with         platelets        No change in current treatment plan.  Refills.     (K21.9) Gastroesophageal reflux disease without esophagitis  Comment: does well on medication.   Plan: omeprazole (PRILOSEC) 20 MG DR capsule        Gastroesophageal reflux (GERD) lifestyle changes that can help improve symptoms include:  Eating smaller meals and not lying down after meals  Elevate head of bed with 6-8 inch blocks or a wedge pillow.   Avoid lying flat on back after eating and at bedtime  Avoid tight fitting clothing  Avoid reflux inducing foods like fat, caffeine, chocolate, carbonated beverages  Maintain an ideal body weight  Avoid alcohol and tobacco  Avoid medications like ibuprofen, naproxen and aspirin    You could try cutting back on the omeprazole gradually.  Try every other day for a few weeks, then every third day.     (N52.9) Erectile dysfunction, unspecified erectile dysfunction type  Comment: does OK with medication  Plan: sildenafil (REVATIO) 20 MG tablet        No change in current treatment plan.  Refills.     (I10) Essential hypertension with goal blood pressure less than 140/90  Comment: doing well  Plan: lisinopril (ZESTRIL) 20 MG tablet,         Comprehensive metabolic panel (BMP + Alb, Alk         Phos, ALT, AST, Total. Bili, TP)        No  change in current treatment plan.     (R05.9) Cough  Comment: occasional cough   Plan: VENTOLIN  (90 Base) MCG/ACT inhaler        Refills to use as needed.  Albuteral inhaler can be used taking 2 inhalations every 4 hours as needed for coughing, wheezing or shortness of breath.     (Z12.5) Screening for prostate cancer  Comment: high PSA in 2019  Plan: PSA, screen        Blood test today.     (Z13.6) CARDIOVASCULAR SCREENING; LDL GOAL LESS THAN 130  Comment:   Plan: Lipid panel reflex to direct LDL Non-fasting        Non-fasting blood tests today.

## 2022-06-20 NOTE — TELEPHONE ENCOUNTER
Central Prior Authorization Team   Phone: [phone]      PRIOR AUTHORIZATION DENIED    Medication: sildenafil (REVATIO) 20 MG tablet - EPA DENIED    Denial Date: 6/20/2022    Denial Rational:       Appeal Information:

## 2022-06-20 NOTE — NURSING NOTE
"Chief Complaint   Patient presents with     Physical       Initial /80   Pulse 82   Temp 98.1  F (36.7  C) (Tympanic)   Resp 16   Ht 1.79 m (5' 10.47\")   Wt 110.7 kg (244 lb)   SpO2 98%   BMI 34.54 kg/m   Estimated body mass index is 34.54 kg/m  as calculated from the following:    Height as of this encounter: 1.79 m (5' 10.47\").    Weight as of this encounter: 110.7 kg (244 lb).    Patient presents to the clinic using     Health Maintenance that is potentially due pending provider review:          Is there anyone who you would like to be able to receive your results?   If yes have patient fill out CECILIO    "

## 2022-06-21 NOTE — RESULT ENCOUNTER NOTE
"Hi Shon,   Your PSA prostate cancer screening test is abnormal but slightly lower than 2 years ago suggesting this is not cancer.  More likely enlarged prostate.   HDL (\"good cholesterol\") is low.  .  Triglycerides, a type of fat found in the bloodstream is high. The LDL \"bad cholesterol\" is at goal level..  The blood chemistries (Basic metabolic panel) are all normal including electrolytes (salt balances in the blood), blood glucose and kidney tests.   Mild anemia which has been present in the past.  Other blood counts OK.   PLAN: Recheck PSA and other tests in 1 year.   No new changes in treatment recommended.   JONATHAN ALLISON MD "

## 2022-10-14 DIAGNOSIS — R05.9 COUGH: ICD-10-CM

## 2022-10-14 DIAGNOSIS — N52.9 ERECTILE DYSFUNCTION, UNSPECIFIED ERECTILE DYSFUNCTION TYPE: ICD-10-CM

## 2022-10-14 RX ORDER — ALBUTEROL SULFATE 90 UG/1
AEROSOL, METERED RESPIRATORY (INHALATION)
Qty: 18 G | Refills: 0 | Status: SHIPPED | OUTPATIENT
Start: 2022-10-14 | End: 2023-11-06

## 2022-10-14 RX ORDER — SILDENAFIL CITRATE 20 MG/1
TABLET ORAL
Qty: 30 TABLET | Refills: 0 | Status: SHIPPED | OUTPATIENT
Start: 2022-10-14 | End: 2023-06-29

## 2022-10-31 ENCOUNTER — VIRTUAL VISIT (OUTPATIENT)
Dept: INTERNAL MEDICINE | Facility: CLINIC | Age: 62
End: 2022-10-31
Payer: COMMERCIAL

## 2022-10-31 DIAGNOSIS — M25.562 ACUTE PAIN OF LEFT KNEE: Primary | ICD-10-CM

## 2022-10-31 DIAGNOSIS — M23.204 OLD TEAR OF MEDIAL MENISCUS OF LEFT KNEE, UNSPECIFIED TEAR TYPE: ICD-10-CM

## 2022-10-31 PROCEDURE — 99213 OFFICE O/P EST LOW 20 MIN: CPT | Mod: 95 | Performed by: PHYSICIAN ASSISTANT

## 2022-10-31 NOTE — PROGRESS NOTES
Shon is a 62 year old who is being evaluated via a billable video visit.      How would you like to obtain your AVS? MyChart  If the video visit is dropped, the invitation should be resent by: Text to cell phone: [phone]  Will anyone else be joining your video visit? No          Assessment & Plan     Acute pain of left knee  Worsening with swelling and pain for 1-2 weeks   - Orthopedic  Referral; Future    Old tear of medial meniscus of left knee, unspecified tear type  - MRI in [facility] from 2016   - Orthopedic  Referral; Future             See Patient Instructions    No follow-ups on file.    Aline Garcia PA-C  [facility]   Shon is a 62 year old, presenting for the following health issues:  Musculoskeletal Problem      HPI     Pain History:  When did you first notice your pain? - Acute Pain   Have you seen anyone else for your pain? No  Where in your body do you have pain? Musculoskeletal problem/pain  Onset/Duration: Over a year  Description  Location: knee - left  Joint Swelling: YES  Redness: No  Pain: YES  Warmth: Can be sometimes  Intensity:  mild, moderate  Progression of Symptoms:  worsening  Accompanying signs and symptoms:   Fevers: No  Numbness/tingling/weakness: No  History  Trauma to the area: YES- 6 years ago meniscus tear  Hx of injury-  Recent illness:  No  Previous similar problem: YES  Previous evaluation:  Xray previously   Precipitating or alleviating factors:  Aggravating factors include: standing, walking, climbing stairs and overuse  Working on knees for job-  Going up and down   Icing     Therapies tried and outcome: rest/inactivity, heat and ice    Has been doing PT exercises at home - his daughter is a physical therapist   Hx of injury to the knee 2016 - MRI done see Epic  Meniscal tear and Nondisplaced impaction type injury of the posterolateral aspect of  the lateral tibial plateau. No depressed bone fragment is  seen with no  disruption of the bone or cartilage surface.        Review of Systems         Objective           Vitals:  No vitals were obtained today due to virtual visit.    Physical Exam   GENERAL: Healthy, alert and no distress  EYES: Eyes grossly normal to inspection.  No discharge or erythema, or obvious scleral/conjunctival abnormalities.  RESP: No audible wheeze, cough, or visible cyanosis.  No visible retractions or increased work of breathing.    SKIN: Visible skin clear. No significant rash, abnormal pigmentation or lesions.  NEURO: Cranial nerves grossly intact.  Mentation and speech appropriate for age.  PSYCH: Mentation appears normal, affect normal/bright, judgement and insight intact, normal speech and appearance well-groomed.                Video-Visit Details    Video Start Time: 1: 04 pm    Type of service:  Video Visit    Video End Time:1:15 PM    Originating Location (pt. Location): Home        Distant Location (provider location):  On-site    Platform used for Video Visit: Similar Pages   Tylenol/response to care/treatments:

## 2022-11-01 ENCOUNTER — ANCILLARY PROCEDURE (OUTPATIENT)
Dept: GENERAL RADIOLOGY | Facility: CLINIC | Age: 62
End: 2022-11-01
Attending: ORTHOPAEDIC SURGERY
Payer: COMMERCIAL

## 2022-11-01 ENCOUNTER — OFFICE VISIT (OUTPATIENT)
Dept: ORTHOPEDICS | Facility: CLINIC | Age: 62
End: 2022-11-01
Attending: PHYSICIAN ASSISTANT
Payer: COMMERCIAL

## 2022-11-01 VITALS
DIASTOLIC BLOOD PRESSURE: 78 MMHG | HEART RATE: 80 BPM | SYSTOLIC BLOOD PRESSURE: 154 MMHG | HEIGHT: 70 IN | OXYGEN SATURATION: 96 % | BODY MASS INDEX: 34.36 KG/M2 | WEIGHT: 240 LBS

## 2022-11-01 DIAGNOSIS — M23.204 OLD TEAR OF MEDIAL MENISCUS OF LEFT KNEE, UNSPECIFIED TEAR TYPE: ICD-10-CM

## 2022-11-01 DIAGNOSIS — M25.562 ACUTE PAIN OF LEFT KNEE: ICD-10-CM

## 2022-11-01 DIAGNOSIS — M76.892 TENDINITIS OF LEFT QUADRICEPS TENDON: ICD-10-CM

## 2022-11-01 DIAGNOSIS — M23.42 LOOSE BODY OF LEFT KNEE: Primary | ICD-10-CM

## 2022-11-01 PROCEDURE — 99244 OFF/OP CNSLTJ NEW/EST MOD 40: CPT | Performed by: ORTHOPAEDIC SURGERY

## 2022-11-01 PROCEDURE — 73562 X-RAY EXAM OF KNEE 3: CPT | Mod: TC | Performed by: RADIOLOGY

## 2022-11-01 ASSESSMENT — PAIN SCALES - GENERAL: PAINLEVEL: SEVERE PAIN (7)

## 2022-11-01 NOTE — PROGRESS NOTES
SUBJECTIVE:   Shon Sargent is a 62 year old male who is seen in consultation at the request of Aline Garcia PA-C for evaluation of left knee pain.  Duration: 6-9 months worse the past 2-3 weeks.   No known injury.  Had an injury in 2016    Present symptoms: walking pain, sharp anterior stabbing pains getting up  Pain moves around  Cracking or popping sometimes makes it better, sometimes worse.   Feeling of giving-way   Sometimes pain radiates down tibia   Swelling  Pain squatting, stairs up and down   Knee -knee pain at night    Treatments tried to this point: NSAIDs,  physical therapy-- instructions given by daughter who is a physical therapist   Knee sleeve      Past Medical History:   Past Medical History:   Diagnosis Date     Hypertension      Past Surgical History:   Past Surgical History:   Procedure Laterality Date     COLONOSCOPY N/A 10/8/2015    Procedure: COLONOSCOPY;  Surgeon: Rolando Ayers MD;  Location: [facility]     ESOPHAGOSCOPY, GASTROSCOPY, DUODENOSCOPY (EGD), COMBINED N/A 11/3/2014    Procedure: COMBINED ESOPHAGOSCOPY, GASTROSCOPY, DUODENOSCOPY (EGD);  Surgeon: Rolando Ayers MD;  Location: [facility]     ESOPHAGOSCOPY, GASTROSCOPY, DUODENOSCOPY (EGD), COMBINED N/A 10/8/2015    Procedure: COMBINED ESOPHAGOSCOPY, GASTROSCOPY, DUODENOSCOPY (EGD), BIOPSY SINGLE OR MULTIPLE;  Surgeon: Rolando Ayers MD;  Location: [facility]     HERNIA REPAIR  2007    umbilical   history of right total hip arthroplasty     Family History:   Family History   Problem Relation Age of Onset     Cancer Mother         lymphoma     Hypertension Father      Circulatory Father      Gastrointestinal Disease Father      Lipids Father      Alzheimer Disease Maternal Grandfather      Anesthesia Reaction Maternal Grandfather      Prostate Cancer Maternal Grandfather      Cardiovascular Paternal Grandfather      Heart Disease Paternal Grandfather      Asthma Son         allergy induced     Asthma Son         al;lergy induced     Coronary  "Artery Disease No family hx of      Colon Cancer No family hx of      Social History:   Social History     Tobacco Use     Smoking status: Never     Smokeless tobacco: Never   Substance Use Topics     Alcohol use: Yes     Comment: occas       Review of Systems:  Constitutional:  NEGATIVE for fever, chills, change in weight  Integumentary/Skin:  NEGATIVE for worrisome rashes, moles or lesions  Eyes:  NEGATIVE for vision changes or irritation  ENT/Mouth:  NEGATIVE for ear, mouth and throat problems  Resp:  NEGATIVE for significant cough or SOB  Breast:  NEGATIVE for masses, tenderness or discharge  CV:  NEGATIVE for chest pain, palpitations or peripheral edema  GI:  NEGATIVE for nausea, abdominal pain, heartburn, or change in bowel habits  :  Negative   Musculoskeletal:  See HPI above  Neuro:  NEGATIVE for weakness, dizziness or paresthesias  Endocrine:  NEGATIVE for temperature intolerance, skin/hair changes  Heme/allergy/immune:  NEGATIVE for bleeding problems  Psychiatric:  NEGATIVE for changes in mood or affect      OBJECTIVE:  Physical Exam:  BP (!) 154/78 (BP Location: Left arm, Patient Position: Sitting, Cuff Size: Adult Regular)   Pulse 80   Ht 1.79 m (5' 10.47\")   Wt 108.9 kg (240 lb)   SpO2 96%   BMI 33.98 kg/m    General Appearance: healthy, alert and no distress   Skin: no suspicious lesions or rashes  Neuro: Normal strength and tone, mentation intact and speech normal  Vascular: good pulses, and cappillary refill   Lymph: no lymphadenopathy   Psych:  mentation appears normal and affect normal/bright  Resp: no increased work of breathing     Left Knee Exam:  Gait: walks with normal gait  Alignment: normal   Squat: 30 % painful.    Patellofemoral joint: mild crepitations in the patellofemoral joint.  Tender proximal pole patella   Pain with resisted extension of the knee  Effusion: moderate  ROM: full  Tender: medial joint line  Masses: none  Ligaments:   Lachman's: stable   Anterior/Posterior " drawer: stable,   Varus/Valgus stress: stable to varus and valgus stress  McMurrays: pain at proximal patella with medial and lateral mcm     X-rays:  Obtained today, reviewed in the office with the patient today:  Normal. ? Intra-meniscal cyst posteriorly?    MRI:    From 2016 left knee reviewed in the office with the patient today:   1. Nondisplaced impaction type injury of the posterolateral aspect of  the lateral tibial plateau. No depressed bone fragment is seen with no  disruption of the bone or cartilage surface.  2. Small tear of the posterior horn of the medial meniscus.  3. Mild chondromalacia of the medial compartment and patella     ASSESSMENT:   Quad tendonitis  Possible loose body based on symptoms   Mild patellofemoral arthrosis.  Possible medial meniscus tear acute on chronic?      PLAN:   MRI left knee to evaluate for loose body or mechanically significant medial meniscus tear   Formal physical therapy if MRI shows no loose body   voltaren to quad tendon area  Discussed home exercise program     Return to clinic: Follow up in the office / virtual visit/ MyChart for the results.      CHRISTY Warner MD  Dept. Orthopedic Surgery  [facility]

## 2022-11-01 NOTE — LETTER
11/1/2022         RE: Shon Sargent  [address]  [facility] [address]        Dear Colleague,    Thank you for referring your patient, Shon Sargent, to the [facility]. Please see a copy of my visit note below.    SUBJECTIVE:   Shon Sargent is a 62 year old male who is seen in consultation at the request of Aline Garcia PA-C for evaluation of left knee pain.  Duration: 6-9 months worse the past 2-3 weeks.   No known injury.  Had an injury in 2016    Present symptoms: walking pain, sharp anterior stabbing pains getting up  Pain moves around  Cracking or popping sometimes makes it better, sometimes worse.   Feeling of giving-way   Sometimes pain radiates down tibia   Swelling  Pain squatting, stairs up and down   Knee -knee pain at night    Treatments tried to this point: NSAIDs,  physical therapy-- instructions given by daughter who is a physical therapist   Knee sleeve      Past Medical History:   Past Medical History:   Diagnosis Date     Hypertension      Past Surgical History:   Past Surgical History:   Procedure Laterality Date     COLONOSCOPY N/A 10/8/2015    Procedure: COLONOSCOPY;  Surgeon: Rolando Ayers MD;  Location: [facility]     ESOPHAGOSCOPY, GASTROSCOPY, DUODENOSCOPY (EGD), COMBINED N/A 11/3/2014    Procedure: COMBINED ESOPHAGOSCOPY, GASTROSCOPY, DUODENOSCOPY (EGD);  Surgeon: Rolando Ayers MD;  Location: [facility]     ESOPHAGOSCOPY, GASTROSCOPY, DUODENOSCOPY (EGD), COMBINED N/A 10/8/2015    Procedure: COMBINED ESOPHAGOSCOPY, GASTROSCOPY, DUODENOSCOPY (EGD), BIOPSY SINGLE OR MULTIPLE;  Surgeon: Rolando Ayers MD;  Location: [facility]     HERNIA REPAIR  2007    umbilical   history of right total hip arthroplasty     Family History:   Family History   Problem Relation Age of Onset     Cancer Mother         lymphoma     Hypertension Father      Circulatory Father      Gastrointestinal Disease Father      Lipids Father      Alzheimer Disease Maternal Grandfather      Anesthesia  "Reaction Maternal Grandfather      Prostate Cancer Maternal Grandfather      Cardiovascular Paternal Grandfather      Heart Disease Paternal Grandfather      Asthma Son         allergy induced     Asthma Son         al;lergy induced     Coronary Artery Disease No family hx of      Colon Cancer No family hx of      Social History:   Social History     Tobacco Use     Smoking status: Never     Smokeless tobacco: Never   Substance Use Topics     Alcohol use: Yes     Comment: occas       Review of Systems:  Constitutional:  NEGATIVE for fever, chills, change in weight  Integumentary/Skin:  NEGATIVE for worrisome rashes, moles or lesions  Eyes:  NEGATIVE for vision changes or irritation  ENT/Mouth:  NEGATIVE for ear, mouth and throat problems  Resp:  NEGATIVE for significant cough or SOB  Breast:  NEGATIVE for masses, tenderness or discharge  CV:  NEGATIVE for chest pain, palpitations or peripheral edema  GI:  NEGATIVE for nausea, abdominal pain, heartburn, or change in bowel habits  :  Negative   Musculoskeletal:  See HPI above  Neuro:  NEGATIVE for weakness, dizziness or paresthesias  Endocrine:  NEGATIVE for temperature intolerance, skin/hair changes  Heme/allergy/immune:  NEGATIVE for bleeding problems  Psychiatric:  NEGATIVE for changes in mood or affect      OBJECTIVE:  Physical Exam:  BP (!) 154/78 (BP Location: Left arm, Patient Position: Sitting, Cuff Size: Adult Regular)   Pulse 80   Ht 1.79 m (5' 10.47\")   Wt 108.9 kg (240 lb)   SpO2 96%   BMI 33.98 kg/m    General Appearance: healthy, alert and no distress   Skin: no suspicious lesions or rashes  Neuro: Normal strength and tone, mentation intact and speech normal  Vascular: good pulses, and cappillary refill   Lymph: no lymphadenopathy   Psych:  mentation appears normal and affect normal/bright  Resp: no increased work of breathing     Left Knee Exam:  Gait: walks with normal gait  Alignment: normal   Squat: 30 % painful.    Patellofemoral joint: mild " crepitations in the patellofemoral joint.  Tender proximal pole patella   Pain with resisted extension of the knee  Effusion: moderate  ROM: full  Tender: medial joint line  Masses: none  Ligaments:   Lachman's: stable   Anterior/Posterior drawer: stable,   Varus/Valgus stress: stable to varus and valgus stress  McMurrays: pain at proximal patella with medial and lateral mcm     X-rays:  Obtained today, reviewed in the office with the patient today:  Normal. ? Intra-meniscal cyst posteriorly?    MRI:    From 2016 left knee reviewed in the office with the patient today:   1. Nondisplaced impaction type injury of the posterolateral aspect of  the lateral tibial plateau. No depressed bone fragment is seen with no  disruption of the bone or cartilage surface.  2. Small tear of the posterior horn of the medial meniscus.  3. Mild chondromalacia of the medial compartment and patella     ASSESSMENT:   Quad tendonitis  Possible loose body based on symptoms   Mild patellofemoral arthrosis.  Possible medial meniscus tear acute on chronic?      PLAN:   MRI left knee to evaluate for loose body or mechanically significant medial meniscus tear   Formal physical therapy if MRI shows no loose body   voltaren to quad tendon area  Discussed home exercise program     Return to clinic: Follow up in the office / virtual visit/ MyChart for the results.      CHRISTY Warner MD  Dept. Orthopedic Surgery  [facility]       Again, thank you for allowing me to participate in the care of your patient.        Sincerely,        Simon Warner MD

## 2022-11-07 ENCOUNTER — HOSPITAL ENCOUNTER (OUTPATIENT)
Dept: MRI IMAGING | Facility: CLINIC | Age: 62
Discharge: HOME | End: 2022-11-07
Attending: ORTHOPAEDIC SURGERY | Admitting: ORTHOPAEDIC SURGERY
Payer: COMMERCIAL

## 2022-11-07 DIAGNOSIS — M23.42 LOOSE BODY OF LEFT KNEE: ICD-10-CM

## 2022-11-07 DIAGNOSIS — M76.892 TENDINITIS OF LEFT QUADRICEPS TENDON: ICD-10-CM

## 2022-11-07 DIAGNOSIS — M23.204 OLD TEAR OF MEDIAL MENISCUS OF LEFT KNEE, UNSPECIFIED TEAR TYPE: ICD-10-CM

## 2022-11-07 DIAGNOSIS — M25.562 ACUTE PAIN OF LEFT KNEE: ICD-10-CM

## 2022-11-07 PROCEDURE — 73721 MRI JNT OF LWR EXTRE W/O DYE: CPT | Mod: 26 | Performed by: RADIOLOGY

## 2022-11-07 PROCEDURE — 73721 MRI JNT OF LWR EXTRE W/O DYE: CPT | Mod: LT

## 2022-11-11 ENCOUNTER — PREP FOR PROCEDURE (OUTPATIENT)
Dept: ORTHOPEDICS | Facility: CLINIC | Age: 62
End: 2022-11-11

## 2022-11-11 ENCOUNTER — MYC MEDICAL ADVICE (OUTPATIENT)
Dept: ORTHOPEDICS | Facility: CLINIC | Age: 62
End: 2022-11-11

## 2022-11-11 DIAGNOSIS — S46.211A RUPTURE OF RIGHT DISTAL BICEPS TENDON, INITIAL ENCOUNTER: Primary | ICD-10-CM

## 2022-11-15 NOTE — TELEPHONE ENCOUNTER
N/A. Left call back  number and advised we can discuss his MRI of knee at his pending visit 11/23/22 at  Orthopedics. Advised to call back if questions or concerns. See previous Mychart message to patient for details.  Addison Patel OPA

## 2022-11-19 ENCOUNTER — HEALTH MAINTENANCE LETTER (OUTPATIENT)
Age: 62
End: 2022-11-19

## 2022-11-21 NOTE — PROGRESS NOTES
"SUBJECTIVE:  Shon Sargent returns for MRI results from 11/7/22 of the left knee, which are reviewed with the patient by myself and showed: (Compared with 2016 MRI)    1. Progression of previously known horizontal oblique tear of the  posterior horn of the medial meniscus, the tear communicates with the  superior articular cartilage surface.  2. Free edge blunting and fraying of the body of the lateral meniscus  with mild intrasubstance degeneration.  3. Tricompartmental articular cartilaginous abnormalities consistent  this grade III chondromalacia according to the modified Outerbridge  Classification.    Duration: 6-9 months worse the past 2-3 weeks.   No known injury.  Had an injury in 2016     Present symptoms: walking pain, sharp anterior stabbing pains getting up  Pain moves around  Cracking or popping sometimes makes it better, sometimes worse.   Feeling of giving-way   Sometimes pain radiates down tibia   Swelling  Pain squatting, stairs up and down   Knee -knee pain at night     Treatments tried to this point: NSAIDs,  physical therapy-- instructions given by daughter who is a physical therapist   Knee sleeve    He reiterates that he has been having mechanical symptoms  Cracks a lot  Positional pain mediallly  But now isn't as painful as last time constantly  Rest and ice slowly made a bit better  Still has a feeling of giving-way, and has pain with pivoting.   Review of Systems:  Constitutional/General: Negative for fever, chills, change in weight  Integumentary/Skin: Negative for worrisome rashes, moles, or lesions  Neuro: Negative for weakness, dizziness, or paresthesias   Psychiatric: negative for changes in mood or affect    OBJECTIVE:  Physical Exam:  /83 (BP Location: Left arm, Patient Position: Sitting, Cuff Size: Adult Regular)   Pulse 91   Ht 1.79 m (5' 10.47\")   Wt 108.9 kg (240 lb)   SpO2 99%   BMI 33.98 kg/m    General Appearance: healthy, alert and no distress   Skin: no " suspicious lesions or rashes  Neuro: Normal strength and tone, mentation intact and speech normal  Vascular: good pulses, and cappillary refill   Lymph: no lymphadenopathy   Psych:  mentation appears normal and affect normal/bright  Resp: no increased work of breathing    Medial joint line tenderness   Mild effusion    ASSESSMENT:   Medial meniscus tear and osteoarthritis left knee.     PLAN:  We had a lengthy  Discussion about the options of arthroscopy vs conservative measures including corticosteroid injection.   He does have mechanical symptoms but is a lot better  He will think about his options and get back to us.  physical therapy ordered.    Return to clinic: as needed     Total time spent was 60 minutes; including but not limited to prep time and discussion.      CHRISTY Warner MD  Dept. Orthopedic Surgery  [facility]

## 2022-11-23 ENCOUNTER — OFFICE VISIT (OUTPATIENT)
Dept: ORTHOPEDICS | Facility: CLINIC | Age: 62
End: 2022-11-23
Payer: COMMERCIAL

## 2022-11-23 VITALS
HEART RATE: 91 BPM | DIASTOLIC BLOOD PRESSURE: 83 MMHG | SYSTOLIC BLOOD PRESSURE: 138 MMHG | BODY MASS INDEX: 34.36 KG/M2 | OXYGEN SATURATION: 99 % | WEIGHT: 240 LBS | HEIGHT: 70 IN

## 2022-11-23 DIAGNOSIS — M23.204 OLD TEAR OF MEDIAL MENISCUS OF LEFT KNEE, UNSPECIFIED TEAR TYPE: Primary | ICD-10-CM

## 2022-11-23 DIAGNOSIS — M17.12 PRIMARY OSTEOARTHRITIS OF LEFT KNEE: ICD-10-CM

## 2022-11-23 PROCEDURE — 99214 OFFICE O/P EST MOD 30 MIN: CPT | Performed by: ORTHOPAEDIC SURGERY

## 2022-11-23 ASSESSMENT — PAIN SCALES - GENERAL: PAINLEVEL: MILD PAIN (3)

## 2022-11-23 NOTE — LETTER
11/23/2022         RE: Shon Sargent  [address]  [facility] [address]        Dear Colleague,    Thank you for referring your patient, Shon Sargent, to the [facility]. Please see a copy of my visit note below.    SUBJECTIVE:  Shon Sargent returns for MRI results from 11/7/22 of the left knee, which are reviewed with the patient by myself and showed: (Compared with 2016 MRI)    1. Progression of previously known horizontal oblique tear of the  posterior horn of the medial meniscus, the tear communicates with the  superior articular cartilage surface.  2. Free edge blunting and fraying of the body of the lateral meniscus  with mild intrasubstance degeneration.  3. Tricompartmental articular cartilaginous abnormalities consistent  this grade III chondromalacia according to the modified Outerbridge  Classification.    Duration: 6-9 months worse the past 2-3 weeks.   No known injury.  Had an injury in 2016     Present symptoms: walking pain, sharp anterior stabbing pains getting up  Pain moves around  Cracking or popping sometimes makes it better, sometimes worse.   Feeling of giving-way   Sometimes pain radiates down tibia   Swelling  Pain squatting, stairs up and down   Knee -knee pain at night     Treatments tried to this point: NSAIDs,  physical therapy-- instructions given by daughter who is a physical therapist   Knee sleeve    He reiterates that he has been having mechanical symptoms  Cracks a lot  Positional pain mediallly  But now isn't as painful as last time constantly  Rest and ice slowly made a bit better  Still has a feeling of giving-way, and has pain with pivoting.   Review of Systems:  Constitutional/General: Negative for fever, chills, change in weight  Integumentary/Skin: Negative for worrisome rashes, moles, or lesions  Neuro: Negative for weakness, dizziness, or paresthesias   Psychiatric: negative for changes in mood or affect    OBJECTIVE:  Physical Exam:  /83  "(BP Location: Left arm, Patient Position: Sitting, Cuff Size: Adult Regular)   Pulse 91   Ht 1.79 m (5' 10.47\")   Wt 108.9 kg (240 lb)   SpO2 99%   BMI 33.98 kg/m    General Appearance: healthy, alert and no distress   Skin: no suspicious lesions or rashes  Neuro: Normal strength and tone, mentation intact and speech normal  Vascular: good pulses, and cappillary refill   Lymph: no lymphadenopathy   Psych:  mentation appears normal and affect normal/bright  Resp: no increased work of breathing    Medial joint line tenderness   Mild effusion    ASSESSMENT:   Medial meniscus tear and osteoarthritis left knee.     PLAN:  We had a lengthy  Discussion about the options of arthroscopy vs conservative measures including corticosteroid injection.   He does have mechanical symptoms but is a lot better  He will think about his options and get back to us.  physical therapy ordered.    Return to clinic: as needed     Total time spent was 60 minutes; including but not limited to prep time and discussion.      CHRISTY Warner MD  Dept. Orthopedic Surgery  [facility]          Again, thank you for allowing me to participate in the care of your patient.        Sincerely,        Simon Warner MD    "

## 2023-06-27 DIAGNOSIS — M1A.9XX0 CHRONIC GOUT INVOLVING TOE WITHOUT TOPHUS, UNSPECIFIED CAUSE, UNSPECIFIED LATERALITY: ICD-10-CM

## 2023-06-27 DIAGNOSIS — I10 ESSENTIAL HYPERTENSION WITH GOAL BLOOD PRESSURE LESS THAN 140/90: ICD-10-CM

## 2023-06-27 DIAGNOSIS — K21.9 GASTROESOPHAGEAL REFLUX DISEASE WITHOUT ESOPHAGITIS: ICD-10-CM

## 2023-06-29 ENCOUNTER — OFFICE VISIT (OUTPATIENT)
Dept: FAMILY MEDICINE | Facility: CLINIC | Age: 63
End: 2023-06-29
Payer: COMMERCIAL

## 2023-06-29 VITALS
SYSTOLIC BLOOD PRESSURE: 139 MMHG | HEART RATE: 68 BPM | WEIGHT: 246 LBS | TEMPERATURE: 98.6 F | RESPIRATION RATE: 16 BRPM | BODY MASS INDEX: 34.83 KG/M2 | DIASTOLIC BLOOD PRESSURE: 84 MMHG | OXYGEN SATURATION: 96 %

## 2023-06-29 DIAGNOSIS — Z13.1 SCREENING FOR DIABETES MELLITUS: ICD-10-CM

## 2023-06-29 DIAGNOSIS — R97.20 ELEVATED PROSTATE SPECIFIC ANTIGEN (PSA): ICD-10-CM

## 2023-06-29 DIAGNOSIS — I10 ESSENTIAL HYPERTENSION WITH GOAL BLOOD PRESSURE LESS THAN 140/90: Primary | ICD-10-CM

## 2023-06-29 DIAGNOSIS — N52.9 ERECTILE DYSFUNCTION, UNSPECIFIED ERECTILE DYSFUNCTION TYPE: ICD-10-CM

## 2023-06-29 DIAGNOSIS — M1A.9XX0 CHRONIC GOUT INVOLVING TOE WITHOUT TOPHUS, UNSPECIFIED CAUSE, UNSPECIFIED LATERALITY: ICD-10-CM

## 2023-06-29 DIAGNOSIS — K21.9 GASTROESOPHAGEAL REFLUX DISEASE WITHOUT ESOPHAGITIS: ICD-10-CM

## 2023-06-29 DIAGNOSIS — Z12.5 SCREENING FOR PROSTATE CANCER: ICD-10-CM

## 2023-06-29 DIAGNOSIS — L98.9 SKIN LESION: ICD-10-CM

## 2023-06-29 LAB
ALBUMIN SERPL BCG-MCNC: 4.4 G/DL (ref 3.5–5.2)
ALP SERPL-CCNC: 91 U/L (ref 40–129)
ALT SERPL W P-5'-P-CCNC: 23 U/L (ref 0–70)
ANION GAP SERPL CALCULATED.3IONS-SCNC: 12 MMOL/L (ref 7–15)
AST SERPL W P-5'-P-CCNC: 25 U/L (ref 0–45)
BILIRUB SERPL-MCNC: 0.6 MG/DL
BUN SERPL-MCNC: 18.8 MG/DL (ref 8–23)
CALCIUM SERPL-MCNC: 9.3 MG/DL (ref 8.8–10.2)
CHLORIDE SERPL-SCNC: 107 MMOL/L (ref 98–107)
CHOLEST SERPL-MCNC: 163 MG/DL
CREAT SERPL-MCNC: 0.97 MG/DL (ref 0.67–1.17)
DEPRECATED HCO3 PLAS-SCNC: 22 MMOL/L (ref 22–29)
ERYTHROCYTE [DISTWIDTH] IN BLOOD BY AUTOMATED COUNT: 14.4 % (ref 10–15)
GFR SERPL CREATININE-BSD FRML MDRD: 89 ML/MIN/1.73M2
GLUCOSE SERPL-MCNC: 114 MG/DL (ref 70–99)
HCT VFR BLD AUTO: 45.5 % (ref 40–53)
HDLC SERPL-MCNC: 36 MG/DL
HGB BLD-MCNC: 14.5 G/DL (ref 13.3–17.7)
LDLC SERPL CALC-MCNC: 104 MG/DL
MCH RBC QN AUTO: 27.4 PG (ref 26.5–33)
MCHC RBC AUTO-ENTMCNC: 31.9 G/DL (ref 31.5–36.5)
MCV RBC AUTO: 86 FL (ref 78–100)
NONHDLC SERPL-MCNC: 127 MG/DL
PLATELET # BLD AUTO: 180 10E3/UL (ref 150–450)
POTASSIUM SERPL-SCNC: 4.2 MMOL/L (ref 3.4–5.3)
PROT SERPL-MCNC: 7 G/DL (ref 6.4–8.3)
PSA SERPL DL<=0.01 NG/ML-MCNC: 5.11 NG/ML (ref 0–4.5)
RBC # BLD AUTO: 5.29 10E6/UL (ref 4.4–5.9)
SODIUM SERPL-SCNC: 141 MMOL/L (ref 136–145)
TRIGL SERPL-MCNC: 117 MG/DL
WBC # BLD AUTO: 5.9 10E3/UL (ref 4–11)

## 2023-06-29 PROCEDURE — 36415 COLL VENOUS BLD VENIPUNCTURE: CPT | Performed by: NURSE PRACTITIONER

## 2023-06-29 PROCEDURE — 83036 HEMOGLOBIN GLYCOSYLATED A1C: CPT | Performed by: NURSE PRACTITIONER

## 2023-06-29 PROCEDURE — 99214 OFFICE O/P EST MOD 30 MIN: CPT | Performed by: NURSE PRACTITIONER

## 2023-06-29 PROCEDURE — 85027 COMPLETE CBC AUTOMATED: CPT | Performed by: NURSE PRACTITIONER

## 2023-06-29 PROCEDURE — 80053 COMPREHEN METABOLIC PANEL: CPT | Performed by: NURSE PRACTITIONER

## 2023-06-29 PROCEDURE — 80061 LIPID PANEL: CPT | Performed by: NURSE PRACTITIONER

## 2023-06-29 PROCEDURE — G0103 PSA SCREENING: HCPCS | Performed by: NURSE PRACTITIONER

## 2023-06-29 RX ORDER — LISINOPRIL 20 MG/1
20 TABLET ORAL DAILY
Qty: 90 TABLET | Refills: 3 | OUTPATIENT
Start: 2023-06-29

## 2023-06-29 RX ORDER — LISINOPRIL 30 MG/1
30 TABLET ORAL DAILY
Qty: 90 TABLET | Refills: 3 | Status: SHIPPED | OUTPATIENT
Start: 2023-06-29 | End: 2024-06-10

## 2023-06-29 RX ORDER — LISINOPRIL 20 MG/1
20 TABLET ORAL DAILY
Qty: 90 TABLET | Refills: 3 | Status: CANCELLED | OUTPATIENT
Start: 2023-06-29

## 2023-06-29 RX ORDER — ALLOPURINOL 300 MG/1
1 TABLET ORAL DAILY
Qty: 90 TABLET | Refills: 3 | Status: SHIPPED | OUTPATIENT
Start: 2023-06-29 | End: 2024-06-10

## 2023-06-29 RX ORDER — SILDENAFIL CITRATE 20 MG/1
TABLET ORAL
Qty: 30 TABLET | Refills: 0 | Status: SHIPPED | OUTPATIENT
Start: 2023-06-29 | End: 2024-06-10

## 2023-06-29 RX ORDER — ALLOPURINOL 300 MG/1
1 TABLET ORAL DAILY
Qty: 90 TABLET | Refills: 3 | OUTPATIENT
Start: 2023-06-29

## 2023-06-29 ASSESSMENT — PAIN SCALES - GENERAL: PAINLEVEL: NO PAIN (0)

## 2023-06-29 NOTE — PROGRESS NOTES
"  Assessment & Plan     (I10) Essential hypertension with goal blood pressure less than 140/90  (primary encounter diagnosis)  Comment: Uncontrolled will increase lisinopril to 30 mg  Plan: lisinopril (ZESTRIL) 30 MG tablet,         Comprehensive metabolic panel (BMP + Alb, Alk         Phos, ALT, AST, Total. Bili, TP), Lipid panel         reflex to direct LDL Fasting, CBC with         platelets            (M1A.9XX0) Chronic gout involving toe without tophus, unspecified cause, unspecified laterality  Comment:  Controlled no change in treatment plan  Plan: allopurinol (ZYLOPRIM) 300 MG tablet    (K21.9) Gastroesophageal reflux disease without esophagitis  Comment:  Controlled no change in treatment plan  Plan: omeprazole (PRILOSEC) 20 MG DR capsule    (N52.9) Erectile dysfunction, unspecified erectile dysfunction type  Comment:  Controlled no change in treatment plan  Plan: sildenafil (REVATIO) 20 MG table    (Z12.5) Screening for prostate cancer  Comment: Per urology note 2020  Repeat PSA in 6 months and consider prostate MR if PSA continues to rise will recheck today  Plan: PSA, screen        (L98.9) Skin lesion  Comment: due to strong history of sun exposure recommend skin check per dermatology   Plan: Adult Dermatology Referral       BMI:   Estimated body mass index is 34.83 kg/m  as calculated from the following:    Height as of 11/23/22: 1.79 m (5' 10.47\").    Weight as of this encounter: 111.6 kg (246 lb).   Weight management plan: Discussed healthy diet and exercise guidelines    See Patient Instructions    TEO Roe CNP  [facility]    Shawn Menendez is a 62 year old, presenting for the following health issues:  Hypertension and Establish Care        6/29/2023     6:54 AM   Additional Questions   Roomed by jaquan KANG     History of Present Illness       Reason for visit:  Check up    He eats 0-1 servings of fruits and vegetables daily.He consumes 1 sweetened " beverage(s) daily.He exercises with enough effort to increase his heart rate 20 to 29 minutes per day.  He exercises with enough effort to increase his heart rate 3 or less days per week.   He is taking medications regularly.       Hypertension Follow-up      Do you check your blood pressure regularly outside of the clinic? No     Are you following a low salt diet? No    Are your blood pressures ever more than 140 on the top number (systolic) OR more   than 90 on the bottom number (diastolic), for example 140/90? Not checking athome           Review of Systems   Constitutional, HEENT, cardiovascular, pulmonary, gi and gu systems are negative, except as otherwise noted.      Objective    /84   Pulse 68   Temp 98.6  F (37  C) (Tympanic)   Resp 16   Wt 111.6 kg (246 lb)   SpO2 96%   BMI 34.83 kg/m    There is no height or weight on file to calculate BMI.  Physical Exam   GENERAL: healthy, alert and no distress  EYES: Eyes grossly normal to inspection, PERRL and conjunctivae and sclerae normal  HENT: ear canals and TM's normal, nose and mouth without ulcers or lesions  NECK: no adenopathy, no asymmetry, masses, or scars and thyroid normal to palpation  RESP: lungs clear to auscultation - no rales, rhonchi or wheezes  CV: regular rate and rhythm, normal S1 S2, no S3 or S4, no murmur, click or rub, no peripheral edema and peripheral pulses strong  ABDOMEN: soft, nontender, no hepatosplenomegaly, no masses and bowel sounds normal  MS: no gross musculoskeletal defects noted, no edema  SKIN: no suspicious lesions or rashes  NEURO: Normal strength and tone, mentation intact and speech normal  PSYCH: mentation appears normal, affect normal/bright    Results for orders placed or performed in visit on 06/29/23   CBC with platelets     Status: Normal   Result Value Ref Range    WBC Count 5.9 4.0 - 11.0 10e3/uL    RBC Count 5.29 4.40 - 5.90 10e6/uL    Hemoglobin 14.5 13.3 - 17.7 g/dL    Hematocrit 45.5 40.0 - 53.0 %     MCV 86 78 - 100 fL    MCH 27.4 26.5 - 33.0 pg    MCHC 31.9 31.5 - 36.5 g/dL    RDW 14.4 10.0 - 15.0 %    Platelet Count 180 150 - 450 10e3/uL

## 2023-06-30 LAB — HBA1C MFR BLD: 5.8 % (ref 0–5.6)

## 2023-07-27 ENCOUNTER — VIRTUAL VISIT (OUTPATIENT)
Dept: UROLOGY | Facility: CLINIC | Age: 63
End: 2023-07-27
Attending: NURSE PRACTITIONER
Payer: COMMERCIAL

## 2023-07-27 DIAGNOSIS — R97.20 ELEVATED PROSTATE SPECIFIC ANTIGEN (PSA): ICD-10-CM

## 2023-07-27 PROCEDURE — 99441 PR PHYSICIAN TELEPHONE EVALUATION 5-10 MIN: CPT | Mod: 95 | Performed by: STUDENT IN AN ORGANIZED HEALTH CARE EDUCATION/TRAINING PROGRAM

## 2023-07-27 NOTE — PROGRESS NOTES
Chief Complaint:   Elevated PSA         History of Present Illness:   Shon Sargent is a 62 year old male with a history of iron deficiency anemia, HTN, gout, and erectile dysfunction who presents for evaluation of elevated PSA.    PSA history:  6/29/2023: 5.11  6/20/2022: 4.58  12/27/2019: 4.76    He has never had a prostate MRI or prostate biopsy. He thinks his maternal grandfather had prostate cancer.     He reports weak stream, urinary hesitancy, nocturia x 1, and occasional sensation of incomplete bladder emptying. He has tried tamsulosin previously and did not tolerate the medication. He denies dysuria and gross hematuria.          Past Medical History:     Past Medical History:   Diagnosis Date    Hypertension             Past Surgical History:     Past Surgical History:   Procedure Laterality Date    COLONOSCOPY N/A 10/8/2015    Procedure: COLONOSCOPY;  Surgeon: Rolando Ayers MD;  Location: [facility]    ESOPHAGOSCOPY, GASTROSCOPY, DUODENOSCOPY (EGD), COMBINED N/A 11/3/2014    Procedure: COMBINED ESOPHAGOSCOPY, GASTROSCOPY, DUODENOSCOPY (EGD);  Surgeon: Rolando Ayers MD;  Location: [facility]    ESOPHAGOSCOPY, GASTROSCOPY, DUODENOSCOPY (EGD), COMBINED N/A 10/8/2015    Procedure: COMBINED ESOPHAGOSCOPY, GASTROSCOPY, DUODENOSCOPY (EGD), BIOPSY SINGLE OR MULTIPLE;  Surgeon: Rolando Ayers MD;  Location: [facility]    HERNIA REPAIR  2007    umbilical            Medications     Current Outpatient Medications   Medication    allopurinol (ZYLOPRIM) 300 MG tablet    lisinopril (ZESTRIL) 30 MG tablet    Omega-3 Fatty Acids (FISH OIL PO)    omeprazole (PRILOSEC) 20 MG DR capsule    Probiotic Product (FORTIFY DAILY PROBIOTIC PO)    sildenafil (REVATIO) 20 MG tablet    VENTOLIN  (90 Base) MCG/ACT inhaler     No current facility-administered medications for this visit.            Allergies:   Valdecoxib         Review of Systems:  From intake questionnaire   Negative 14 system review except as noted on HPI, nurse's  note.         Physical Exam:   Patient is a 62 year old male evaluated via video visit.       Labs and Pathology:    I personally reviewed all applicable laboratory data and went over findings with patient  Significant for:    CBC RESULTS:  Recent Labs   Lab Test 06/29/23  0736 06/20/22  1345 09/02/19  0619 06/11/19  1619   WBC 5.9 7.5 7.8 7.3   HGB 14.5 13.1* 13.8 15.2    215 181 213        BMP RESULTS:  Recent Labs   Lab Test 06/29/23  0736 06/20/22  1345 09/02/19  0619 06/11/19  1619 01/04/18  0957    138 142 142 140   POTASSIUM 4.2 3.9 4.5 3.9 4.3   CHLORIDE 107 108 112* 109 106   CO2 22 24 23 26 28   ANIONGAP 12 6 7 7 6   * 96 109* 92 90   BUN 18.8 16 33* 16 17   CR 0.97 0.88 0.85 0.85 0.90   GFRESTIMATED 89 >90 >90 >90 88   GFRESTBLACK  --   --  >90 >90 >90   ALIX 9.3 8.7 7.9* 9.1 8.7       UA RESULTS:   Recent Labs   Lab Test 06/11/19  1619   SG 1.015   URINEPH 5.0   NITRITE Negative   RBCU 0   WBCU <1       PSA RESULTS  PSA   Date Value Ref Range Status   12/27/2019 4.76 (H) 0 - 4 ug/L Final     Comment:     Assay Method:  Chemiluminescence using Siemens Vista analyzer     Prostate Specific Antigen Screen   Date Value Ref Range Status   06/29/2023 5.11 (H) 0.00 - 4.50 ng/mL Final   06/20/2022 4.58 (H) 0.00 - 4.00 ug/L Final            Assessment and Plan:     Assessment: 62 year old male who presents for evaluation of elevated PSA. We discussed the possible causes of elevated PSA including prostate cancer, prostate enlargement, UTI, voiding symptoms, and ejaculation or bike/motorcycle riding around the time of lab draw.     We discussed the evaluation of elevated PSA, including prostate MRI and subsequent prostate biopsy, if indicated. His PSA has been relatively stable over the last 3-4 years and is still only mildly elevated at 5.11 ng/mL. We discussed either proceeding with a prostate MRI now vs repeating a PSA in three months and ordering a prostate MRI if his PSA increases. The  patient elected to repeat his PSA in three months.     Plan:  Repeat PSA in 3 months.     SOO ROLDAN PA-C  Department of Urology

## 2023-07-27 NOTE — PROGRESS NOTES
Shon Sargent is a 62 year old year old who is being evaluated via a billable video visit.  Changed to telephone visit due to connection difficulty.    How would you like to obtain your AVS? MyChart  If the video visit is dropped, the invitation should be resent by: Text to cell phone: [phone]  Will anyone else be joining your video visit? No    Shon Sargent is a 62 year old who is being evaluated via telephone visit.       Distant Location (provider location):  On-site    Phone call duration: 7 minutes

## 2023-08-03 ENCOUNTER — OFFICE VISIT (OUTPATIENT)
Dept: DERMATOLOGY | Facility: CLINIC | Age: 63
End: 2023-08-03
Attending: NURSE PRACTITIONER
Payer: COMMERCIAL

## 2023-08-03 DIAGNOSIS — L82.1 SEBORRHEIC KERATOSIS: ICD-10-CM

## 2023-08-03 DIAGNOSIS — L57.0 ACTINIC KERATOSIS: ICD-10-CM

## 2023-08-03 DIAGNOSIS — L81.4 LENTIGO: ICD-10-CM

## 2023-08-03 DIAGNOSIS — I78.1 TELANGIECTASIA: ICD-10-CM

## 2023-08-03 DIAGNOSIS — L40.9 PSORIASIS: Primary | ICD-10-CM

## 2023-08-03 DIAGNOSIS — D22.9 NEVUS: ICD-10-CM

## 2023-08-03 PROCEDURE — 17000 DESTRUCT PREMALG LESION: CPT | Performed by: PHYSICIAN ASSISTANT

## 2023-08-03 PROCEDURE — 99203 OFFICE O/P NEW LOW 30 MIN: CPT | Mod: 25 | Performed by: PHYSICIAN ASSISTANT

## 2023-08-03 PROCEDURE — 17003 DESTRUCT PREMALG LES 2-14: CPT | Performed by: PHYSICIAN ASSISTANT

## 2023-08-03 RX ORDER — FLUOCINONIDE 0.5 MG/G
CREAM TOPICAL
Qty: 60 G | Refills: 3 | Status: SHIPPED | OUTPATIENT
Start: 2023-08-03 | End: 2024-02-13

## 2023-08-03 NOTE — PROGRESS NOTES
HPI:   Chief complaints: Shon Sargent is a pleasant 62 year old male who presents for evaluation of a non healing spot on the right cheek. It has been present for months. It is not tender and does not bleed. He would like some itchy spots on the back checked. He also has a history of psoriasis on the back of the scalp which comes and goes. He has used a topical steroid cream for this in the past. Lastly he has a red spot on the chin he would like checked.       PHYSICAL EXAM:    There were no vitals taken for this visit.  Skin exam performed as follows: Type 2 skin. Mood appropriate  Alert and Oriented X 3. Well developed, well nourished in no distress.  General appearance: Normal  Head including face: Normal  Eyes: conjunctiva and lids: Normal  Mouth: Lips, teeth, gums: Normal  Neck: Normal  Skin: Scalp and body hair: See below    Pink gritty papule on the right helix x 2, right lateral cheek x 2, right mid cheek x 1, left sideburn x 1  Brown and tan macules; keratotic papules on the back  Occipital scalp clear today  Telangiectasia on the chin    ASSESSMENT/PLAN:     Actinic keratosis on the right helix x 2, right lateral cheek x 2, right mid cheek x 1, left sideburn x 1. As precancerous, cryosurgery performed. Advised on blistering and post-op care. Advised if not resolved in 1-2 months to return for evaluation  Nevi, lentigos SKs on the back - advised benign  Recurrent psoriasis on the scalp  --Start lidex BID x 1-2 weeks then PRN  Small telangiectasia on the chin - advised benign no treatment needed          Follow-up: yearly/PRN sooner  CC:   Scribed By: Beverley Ramirez, MS, PA-C

## 2023-08-03 NOTE — LETTER
8/3/2023         RE: Shon Sargent   [address]  [facility] [address]        Dear Colleague,    Thank you for referring your patient, Shon Sargent, to the [facility]. Please see a copy of my visit note below.    HPI:   Chief complaints: Shon Sargent is a pleasant 62 year old male who presents for evaluation of a non healing spot on the right cheek. It has been present for months. It is not tender and does not bleed. He would like some itchy spots on the back checked. He also has a history of psoriasis on the back of the scalp which comes and goes. He has used a topical steroid cream for this in the past. Lastly he has a red spot on the chin he would like checked.       PHYSICAL EXAM:    There were no vitals taken for this visit.  Skin exam performed as follows: Type 2 skin. Mood appropriate  Alert and Oriented X 3. Well developed, well nourished in no distress.  General appearance: Normal  Head including face: Normal  Eyes: conjunctiva and lids: Normal  Mouth: Lips, teeth, gums: Normal  Neck: Normal  Skin: Scalp and body hair: See below    Pink gritty papule on the right helix x 2, right lateral cheek x 2, right mid cheek x 1, left sideburn x 1  Brown and tan macules; keratotic papules on the back  Occipital scalp clear today  Telangiectasia on the chin    ASSESSMENT/PLAN:     Actinic keratosis on the right helix x 2, right lateral cheek x 2, right mid cheek x 1, left sideburn x 1. As precancerous, cryosurgery performed. Advised on blistering and post-op care. Advised if not resolved in 1-2 months to return for evaluation  Nevi, lentigos SKs on the back - advised benign  Recurrent psoriasis on the scalp  --Start lidex BID x 1-2 weeks then PRN  Small telangiectasia on the chin - advised benign no treatment needed          Follow-up: yearly/PRN sooner  CC:   Scribed By: Beverley Ramirez, MS, PA-C      Again, thank you for allowing me to participate in the care of your patient.         Sincerely,        Beverley Mae PA-C

## 2023-09-10 ENCOUNTER — HEALTH MAINTENANCE LETTER (OUTPATIENT)
Age: 63
End: 2023-09-10

## 2023-10-10 ENCOUNTER — LAB (OUTPATIENT)
Dept: LAB | Facility: CLINIC | Age: 63
End: 2023-10-10
Payer: COMMERCIAL

## 2023-10-10 DIAGNOSIS — R97.20 ELEVATED PROSTATE SPECIFIC ANTIGEN (PSA): ICD-10-CM

## 2023-10-10 LAB — PSA SERPL DL<=0.01 NG/ML-MCNC: 5.27 NG/ML (ref 0–4.5)

## 2023-10-10 PROCEDURE — 84153 ASSAY OF PSA TOTAL: CPT

## 2023-10-10 PROCEDURE — 36415 COLL VENOUS BLD VENIPUNCTURE: CPT

## 2023-10-11 ENCOUNTER — TELEPHONE (OUTPATIENT)
Dept: UROLOGY | Facility: CLINIC | Age: 63
End: 2023-10-11
Payer: COMMERCIAL

## 2023-10-11 DIAGNOSIS — R97.20 ELEVATED PROSTATE SPECIFIC ANTIGEN (PSA): Primary | ICD-10-CM

## 2023-10-11 NOTE — TELEPHONE ENCOUNTER
[facility] Call Center    Phone Message    May a detailed message be left on voicemail: yes     Reason for Call: Order(s): Prostate MRI  Reason for requested: per note from Roopa García in PSA results. Please contact patient when placed. Thank you.  Date needed: ASAP  Provider name: Raquel    Action Taken: Message routed to:  Other: Uro    Travel Screening: Not Applicable

## 2023-10-11 NOTE — TELEPHONE ENCOUNTER
Please order prostate MRI and we can let him know when it is ordered. Thank you.   Lisbeth BARRON RN BSN PHN  Specialty Clinics

## 2023-10-12 NOTE — TELEPHONE ENCOUNTER
Roopa García PA-C  You2 hours ago (8:48 AM)     EM  Ordered. He can call [phone] to schedule.

## 2023-10-27 ENCOUNTER — TELEPHONE (OUTPATIENT)
Dept: UROLOGY | Facility: CLINIC | Age: 63
End: 2023-10-27
Payer: COMMERCIAL

## 2023-10-27 NOTE — TELEPHONE ENCOUNTER
PSA increased- Prostate MRI is next step.  And done at [place]  (not done any other locations or at mobile or outside rad options.)    Left message on answering machine for patient to call back.     Susana FOFANA   Specialty Clinic RN

## 2023-10-27 NOTE — TELEPHONE ENCOUNTER
[facility] Call Center    Phone Message    May a detailed message be left on voicemail: yes     Reason for Call: Other: Pt called and stated he thought he needed a U/S done and NOT an MRI - pt a little confused and would like to know what imagine he actually needs - thanks!     Action Taken: Message routed to:  Other: Uro    Travel Screening: Not Applicable

## 2023-10-27 NOTE — TELEPHONE ENCOUNTER
Pt called and confirmed he has the MRI rescheduled as he was too ill to go today.  Susana FOFANA   [facility] [place]

## 2023-11-06 DIAGNOSIS — R05.9 COUGH: ICD-10-CM

## 2023-11-06 RX ORDER — ALBUTEROL SULFATE 90 UG/1
AEROSOL, METERED RESPIRATORY (INHALATION)
Qty: 18 G | Refills: 0 | Status: SHIPPED | OUTPATIENT
Start: 2023-11-06 | End: 2024-06-10

## 2023-11-06 NOTE — TELEPHONE ENCOUNTER
"Prescription approved per [facility] Refill Protocol.     Requested Prescriptions   Pending Prescriptions Disp Refills    VENTOLIN  (90 Base) MCG/ACT inhaler 18 g 0     Sig: INHALE 2 PUFFS BY MOUTH EVERY 6 HOURS AS NEEDED FOR SHORTNESS OF BREATH /DYSPNEA  OR  WHEEZING       Asthma Maintenance Inhalers - Anticholinergics Passed - 11/6/2023  9:14 AM        Passed - Patient is age 12 years or older        Passed - Recent (12 mo) or future (30 days) visit within the authorizing provider's specialty     Patient has had an office visit with the authorizing provider or a provider within the authorizing providers department within the previous 12 mos or has a future within next 30 days. See \"Patient Info\" tab in inbasket, or \"Choose Columns\" in Meds & Orders section of the refill encounter.              Passed - Medication is active on med list       Short-Acting Beta Agonist Inhalers Protocol  Passed - 11/6/2023  9:14 AM        Passed - Patient is age 12 or older        Passed - Recent (12 mo) or future (30 days) visit within the authorizing provider's specialty     Patient has had an office visit with the authorizing provider or a provider within the authorizing providers department within the previous 12 mos or has a future within next 30 days. See \"Patient Info\" tab in inbasket, or \"Choose Columns\" in Meds & Orders section of the refill encounter.              Passed - Medication is active on med list                 Pebbles Rodriguez RN 11/06/23 1:41 PM   "

## 2023-11-24 ENCOUNTER — ANCILLARY PROCEDURE (OUTPATIENT)
Dept: MRI IMAGING | Facility: CLINIC | Age: 63
End: 2023-11-24
Attending: STUDENT IN AN ORGANIZED HEALTH CARE EDUCATION/TRAINING PROGRAM
Payer: COMMERCIAL

## 2023-11-24 DIAGNOSIS — R97.20 ELEVATED PROSTATE SPECIFIC ANTIGEN (PSA): ICD-10-CM

## 2023-11-24 PROCEDURE — A9585 GADOBUTROL INJECTION: HCPCS | Mod: JZ | Performed by: RADIOLOGY

## 2023-11-24 PROCEDURE — 72197 MRI PELVIS W/O & W/DYE: CPT | Performed by: RADIOLOGY

## 2023-11-24 RX ORDER — GADOBUTROL 604.72 MG/ML
15 INJECTION INTRAVENOUS ONCE
Status: COMPLETED | OUTPATIENT
Start: 2023-11-24 | End: 2023-11-24

## 2023-11-24 RX ADMIN — GADOBUTROL 11 ML: 604.72 INJECTION INTRAVENOUS at 17:12

## 2023-11-27 ENCOUNTER — TELEPHONE (OUTPATIENT)
Dept: UROLOGY | Facility: CLINIC | Age: 63
End: 2023-11-27
Payer: COMMERCIAL

## 2023-11-27 NOTE — TELEPHONE ENCOUNTER
Results discussed with patient. Would recommend prostate biopsy. The patient is in agreement with the plan.

## 2023-11-27 NOTE — TELEPHONE ENCOUNTER
Patient called back for MRI results.  He can be reached at [phone].    Thank you,  Betzaida DUMONT [facility]

## 2023-11-27 NOTE — TELEPHONE ENCOUNTER
CAROLAM and callback to schedule pt a prostate bx at [facility] per Roopa García

## 2023-11-30 ENCOUNTER — TELEPHONE (OUTPATIENT)
Dept: UROLOGY | Facility: CLINIC | Age: 63
End: 2023-11-30
Payer: COMMERCIAL

## 2023-11-30 NOTE — TELEPHONE ENCOUNTER
Reason for Call:  Other call back    Detailed comments: Pt has a prostate bx on Jan 24th that Roopa wanted scheduled but pt is wondering if that is to far out of an appointment. Pt states he has a lot of questions and some concerns and would like to talk to Roopa or nurse.     Phone Number Patient can be reached at: Cell number on file:    Telephone Information:   Mobile [phone]       Best Time:       Can we leave a detailed message on this number? YES    Call taken on 11/30/2023 at 8:46 AM by Aline Montoya MA

## 2023-11-30 NOTE — TELEPHONE ENCOUNTER
Spoke to patient and he is worried about waiting until January 24th and he would like someone to explain the MRI results, the biopsy, and his labs. He wants to understand the numbers. Pt stated he has no idea what is really going on and was told he may have cancer. He reported he is worried that he has to wait for the biopsy. Pt would like to talk to provider or can he have appt to discuss everything.   Lisbeth BARRON RN BSN PHN  Specialty Clinics

## 2023-11-30 NOTE — TELEPHONE ENCOUNTER
Can you see what questions he has? With the holidays, January is our soonest availability for biopsy.

## 2023-12-11 ENCOUNTER — PATIENT OUTREACH (OUTPATIENT)
Dept: UROLOGY | Facility: CLINIC | Age: 63
End: 2023-12-11
Payer: COMMERCIAL

## 2023-12-11 NOTE — TELEPHONE ENCOUNTER
Writer reached out to pt to offer a sooner appt for prostate bx.     Pt agreeable to 1/17 with follow up scheduled on 1/24.     Will continue to follow as needed.

## 2024-01-08 ENCOUNTER — PRE VISIT (OUTPATIENT)
Dept: UROLOGY | Facility: CLINIC | Age: 64
End: 2024-01-08

## 2024-01-08 DIAGNOSIS — R97.20 ELEVATED PROSTATE SPECIFIC ANTIGEN (PSA): Primary | ICD-10-CM

## 2024-01-08 RX ORDER — CIPROFLOXACIN 500 MG/1
500 TABLET, FILM COATED ORAL 2 TIMES DAILY
Qty: 6 TABLET | Refills: 0 | Status: SHIPPED | OUTPATIENT
Start: 2024-01-08 | End: 2024-01-11

## 2024-01-08 RX ORDER — GENTAMICIN 40 MG/ML
80 INJECTION, SOLUTION INTRAMUSCULAR; INTRAVENOUS ONCE
Status: COMPLETED | OUTPATIENT
Start: 2024-01-17 | End: 2024-01-17

## 2024-01-08 NOTE — CONFIDENTIAL NOTE
Reason for visit: fusion bx     Relevant information: elevated psa, pirads 4, 1 lesion    Records/imaging/labs/orders: in epic    At Rooming: verify procedure prep, GIVE gent      Called patient to go over prep for biopsy including:    No blood thinning medications for 7 days before procedure, including aspirin, anticoagulants, ibuprofen and fish oil.     prophylactic antibiotics sent to primary pharmacy listed in chart:   -take the day before, the day of and the day after the procedure, one tablet, two times daily.    Complete a Fleets enema approximately 2 hours before the scheduled procedure.    Do not come to the appointment fasted.     Roseline Dia  1/8/2024  1:31 PM

## 2024-01-17 ENCOUNTER — OFFICE VISIT (OUTPATIENT)
Dept: UROLOGY | Facility: CLINIC | Age: 64
End: 2024-01-17
Payer: COMMERCIAL

## 2024-01-17 VITALS
BODY MASS INDEX: 35.22 KG/M2 | HEART RATE: 72 BPM | WEIGHT: 246 LBS | DIASTOLIC BLOOD PRESSURE: 100 MMHG | HEIGHT: 70 IN | SYSTOLIC BLOOD PRESSURE: 142 MMHG

## 2024-01-17 DIAGNOSIS — R97.20 ELEVATED PROSTATE SPECIFIC ANTIGEN (PSA): Primary | ICD-10-CM

## 2024-01-17 PROCEDURE — 55700 PR BIOPSY OF PROSTATE,NEEDLE/PUNCH: CPT | Performed by: UROLOGY

## 2024-01-17 PROCEDURE — 88305 TISSUE EXAM BY PATHOLOGIST: CPT | Mod: TC | Performed by: UROLOGY

## 2024-01-17 PROCEDURE — 76872 US TRANSRECTAL: CPT | Performed by: UROLOGY

## 2024-01-17 PROCEDURE — 76999 ECHO EXAMINATION PROCEDURE: CPT | Performed by: UROLOGY

## 2024-01-17 PROCEDURE — G0416 PROSTATE BIOPSY, ANY MTHD: HCPCS | Mod: 26 | Performed by: PATHOLOGY

## 2024-01-17 PROCEDURE — 88344 IMHCHEM/IMCYTCHM EA MLT ANTB: CPT | Mod: 26 | Performed by: PATHOLOGY

## 2024-01-17 RX ADMIN — GENTAMICIN 80 MG: 40 INJECTION, SOLUTION INTRAMUSCULAR; INTRAVENOUS at 15:15

## 2024-01-17 ASSESSMENT — PAIN SCALES - GENERAL: PAINLEVEL: NO PAIN (0)

## 2024-01-17 NOTE — PATIENT INSTRUCTIONS
[facility]  Precautions Following a Prostate Biopsy    There are four conditions that you should watch for after a prostate biopsy:    Excessive pain  Bleeding irregularities (passing numerous  dime sized  clots or if your urine looks like cranberry juice)  Fever of 100 degrees or more  If you are unable to urinate      If any of these occur, call the Urology Clinic during normal business hours (M-F, 8:00-4:30) at [phone].  If you experience a problem after normal business hours, call our 24-hour phone number at [phone] and ask for the Urology Resident on call to be paged.    If you experience any discomfort following the biopsy, you may take Tylenol.  DO NOT TAKE ASPIRIN unless specified by your physician.   If the discomfort becomes severe or uncontrolled by medication, contact the Urology Clinic or Urology Resident (after normal business hours).    Do not be alarmed if you have some blood in your stool, in your urine, or ejaculate (semen).  This occurrence is normal and may last up to three (3) or four (4) days, usually intermittently.  Blood in the ejaculate (semen) may last several weeks, up to about a dozen ejaculations.  The blood in your ejaculate may appear as brown streaks, blood tinged, and immediately following a biopsy, it may appear bright red.    If you run a fever above 100 degrees, call the Urology Clinic or Urology Resident (after normal business hours) immediately.  If you are unable to reach your physician or the Resident on call, go to the nearest emergency room.  Explain that you have had a transrectal biopsy of your prostate and what problems you are experiencing.      You should attempt to urinate following your biopsy before you leave the clinic.  If you are unable to urinate four (4) to six (6) hours after you leave the clinic, you will need to contact the Urology Clinic or the Resident on call.  If you are unable to reach your physician or the  Resident on call, go to the nearest emergency room.      If you have any questions or concerns after your biopsy, feel free to contact the Urology Clinic at [phone] during M-F, 8:00-5pm business hours.  If you need to speak with someone after normal business hours, call [phone] and ask for the Resident on call to be paged.

## 2024-01-17 NOTE — PROGRESS NOTES
"Chief Complaint   Patient presents with    Prostate Biopsy       Blood pressure (!) 142/100, pulse 72, height 1.778 m (5' 10\"), weight 111.6 kg (246 lb). Body mass index is 35.3 kg/m .    Patient Active Problem List   Diagnosis    Essential hypertension with goal blood pressure less than 140/90    Tremor    Gout    ED (erectile dysfunction)    Psoriasis    Esophageal reflux (GERD)    CARDIOVASCULAR SCREENING; LDL GOAL LESS THAN 130    Iron deficiency anemia       Allergies   Allergen Reactions    Valdecoxib Hives     Bextra       Current Outpatient Medications   Medication Sig Dispense Refill    allopurinol (ZYLOPRIM) 300 MG tablet Take 1 tablet (300 mg) by mouth daily 90 tablet 3    fluocinonide (LIDEX) 0.05 % external cream Apply to AA BID x 1-2 weeks PRN flares 60 g 3    lisinopril (ZESTRIL) 30 MG tablet Take 1 tablet (30 mg) by mouth daily 90 tablet 3    omeprazole (PRILOSEC) 20 MG DR capsule Take 1 capsule (20 mg) by mouth daily 90 capsule 3    sildenafil (REVATIO) 20 MG tablet TAKE BY MOUTH AS MANY TABLETS (MAX OF 5 TABS) AS NEEDED AT A TIME PRIOR TO INTERCOURSE. 30 tablet 0    VENTOLIN  (90 Base) MCG/ACT inhaler INHALE 2 PUFFS BY MOUTH EVERY 6 HOURS AS NEEDED FOR SHORTNESS OF BREATH /DYSPNEA  OR  WHEEZING 18 g 0    Omega-3 Fatty Acids (FISH OIL PO) Take 2 capsules by mouth daily (Patient not taking: Reported on 2024)      Probiotic Product (FORTIFY DAILY PROBIOTIC PO) Take 1 capsule by mouth daily (Patient not taking: Reported on 2024)         Social History     Tobacco Use    Smoking status: Never    Smokeless tobacco: Never   Vaping Use    Vaping Use: Never used   Substance Use Topics    Alcohol use: Yes     Comment: occas    Drug use: No       Invasive Procedure Safety Checklist:    Procedure: MRI fusion TRUS prostate biopsy    Action: Complete sections and checkboxes as appropriate.    Pre-procedure:  1. Patient ID Verified with 2 identifiers (Marlyn and  or MRN) : YES    2. Procedure " and site verified with patient/designee (when able) : YES    3. Accurate consent documentation in medical record : YES    4. H&P (or appropriate assessment) documented in medical record : N/A  H&P must be up to 30 days prior to procedure an updated within 24 hours of                 Procedure as applicable.     5. Relevant diagnostic and radiology test results appropriately labeled and displayed as applicable : YES    6. Blood products, implants, devices, and/or special equipment available for the procedure as applicable : YES    7. Procedure site(s) marked with provider initials [Exclusions: none] : NO    8. Marking not required. Reason : Yes  Procedure does not require site marking    Time Out:     Time-Out performed immediately prior to starting procedure, including verbal and active participation of all team members addressing: YES    1. Correct patient identity.  2. Confirmed that the correct side and site are marked.  3. An accurate procedure to be done.  4. Agreement on the procedure to be done.  5. Correct patient position.  6. Relevant images and results are properly labeled and appropriately displayed.  7. The need to administer antibiotics or fluids for irrigation purposes during the procedure as applicable.  8. Safety precautions based on patient history or medication use.    During Procedure: Verification of correct person, site, and procedure occurs any time the responsibility for care of the patient is transferred to another member of the care team.    The following medication was given:     MEDICATION:  Gentamicin  ROUTE: IM  SITE: Ventrogluteal - Right  DOSE: 80 mg  LOT #: [id number]  : [employer]  EXPIRATION DATE: 12/24  NDC#: 03817-851-49   Was there drug waste? No    Prior to injection, verified patient identity using patient's name and date of birth.  Due to injection administration, patient instructed to remain in clinic for 15 minutes  afterwards, and to report any adverse reaction  to me immediately.    Drug Amount Wasted:  None.  Vial/Syringe: Single dose vial    The following medication was given:     MEDICATION:  Lidocaine without epinephrine 1%  ROUTE: administered by physician - prostate nerve block   SITE: administered by physician - prostate nerve block    DOSE: 10 mL  LOT #: [id number]  : [employer]  EXPIRATION DATE: 04/27  NDC#: 82984-394-77   Was there drug waste? Yes  Amount of drug waste (mL): 20 mL.  Reason for waste:  Single use vial    Prior to injection, verified patient identity using patient's name and date of birth.  Due to injection administration, patient instructed to remain in clinic for 15 minutes  afterwards, and to report any adverse reaction to me immediately.    Drug Amount Wasted:  Yes: 20 mL (10 mg/ml )  Vial/Syringe: Single dose vial    Roseline Dia  1/17/2024  3:01 PM    PREPROCEDURE DIAGNOSIS: Elevated PSA   POSTPROCEDURE DIAGNOSIS: Elevated PSA  PROCEDURE: Transrectal ultrasound sizing and transrectal ultrasound guided prostatic needle biopsy, including MRI fusion targeting  for Shon Sargent who is a 63 year old male.   SURGEON: Elizabeth Diop MD   ANESTHESIA: 5 mL of 1% periprostatic block bilaterally   We previously obtained an MRI of the prostate and identified all PIRADS 3-5 lesions for targeting    DESCRIPTION OF PROCEDURE: The procedure, the outcome, the anesthesia, and the risks were discussed with the patient. Informed consent was obtained and signed and a timeout was completed prior to the procedure. Digital rectal examination was performed with the below findings noted. Anesthesia was administered as noted above and the transrectal ultrasound probe was inserted, sizing was performed, and the below findings were noted. 2 core biopsies were taken from each of the MRI targets, and 12 core biopsies were taken as described below. The probe was then withdrawn. Patient tolerated the procedure well.   FINDINGS: Digital rectal exam reveals  normal prostate. US images were obtained and then fused with the MRI images.  The fused images were then used to guide the biopsy of the targeted lesions with 2 cores taken of each lesion.  We then performed random biopsies.  12 cores were taken with 6 on each side, base, mid and apex.      Elizabeth Diop MD   Department of Urology   [facility]

## 2024-01-17 NOTE — LETTER
"1/17/2024       RE: Shon Sargent  [address]  [facility] [address]     Dear Colleague,    Thank you for referring your patient, Shon Sargent, to the [facility] [place] at [facility]. Please see a copy of my visit note below.    Chief Complaint   Patient presents with    Prostate Biopsy       Blood pressure (!) 142/100, pulse 72, height 1.778 m (5' 10\"), weight 111.6 kg (246 lb). Body mass index is 35.3 kg/m .    Patient Active Problem List   Diagnosis    Essential hypertension with goal blood pressure less than 140/90    Tremor    Gout    ED (erectile dysfunction)    Psoriasis    Esophageal reflux (GERD)    CARDIOVASCULAR SCREENING; LDL GOAL LESS THAN 130    Iron deficiency anemia       Allergies   Allergen Reactions    Valdecoxib Hives     Bextra       Current Outpatient Medications   Medication Sig Dispense Refill    allopurinol (ZYLOPRIM) 300 MG tablet Take 1 tablet (300 mg) by mouth daily 90 tablet 3    fluocinonide (LIDEX) 0.05 % external cream Apply to AA BID x 1-2 weeks PRN flares 60 g 3    lisinopril (ZESTRIL) 30 MG tablet Take 1 tablet (30 mg) by mouth daily 90 tablet 3    omeprazole (PRILOSEC) 20 MG DR capsule Take 1 capsule (20 mg) by mouth daily 90 capsule 3    sildenafil (REVATIO) 20 MG tablet TAKE BY MOUTH AS MANY TABLETS (MAX OF 5 TABS) AS NEEDED AT A TIME PRIOR TO INTERCOURSE. 30 tablet 0    VENTOLIN  (90 Base) MCG/ACT inhaler INHALE 2 PUFFS BY MOUTH EVERY 6 HOURS AS NEEDED FOR SHORTNESS OF BREATH /DYSPNEA  OR  WHEEZING 18 g 0    Omega-3 Fatty Acids (FISH OIL PO) Take 2 capsules by mouth daily (Patient not taking: Reported on 1/17/2024)      Probiotic Product (FORTIFY DAILY PROBIOTIC PO) Take 1 capsule by mouth daily (Patient not taking: Reported on 1/17/2024)         Social History     Tobacco Use    Smoking status: Never    Smokeless tobacco: Never   Vaping Use    Vaping Use: Never used   Substance Use Topics    " Alcohol use: Yes     Comment: occas    Drug use: No       Invasive Procedure Safety Checklist:    Procedure: MRI fusion TRUS prostate biopsy    Action: Complete sections and checkboxes as appropriate.    Pre-procedure:  1. Patient ID Verified with 2 identifiers (Marlyn and  or MRN) : YES    2. Procedure and site verified with patient/designee (when able) : YES    3. Accurate consent documentation in medical record : YES    4. H&P (or appropriate assessment) documented in medical record : N/A  H&P must be up to 30 days prior to procedure an updated within 24 hours of                 Procedure as applicable.     5. Relevant diagnostic and radiology test results appropriately labeled and displayed as applicable : YES    6. Blood products, implants, devices, and/or special equipment available for the procedure as applicable : YES    7. Procedure site(s) marked with provider initials [Exclusions: none] : NO    8. Marking not required. Reason : Yes  Procedure does not require site marking    Time Out:     Time-Out performed immediately prior to starting procedure, including verbal and active participation of all team members addressing: YES    1. Correct patient identity.  2. Confirmed that the correct side and site are marked.  3. An accurate procedure to be done.  4. Agreement on the procedure to be done.  5. Correct patient position.  6. Relevant images and results are properly labeled and appropriately displayed.  7. The need to administer antibiotics or fluids for irrigation purposes during the procedure as applicable.  8. Safety precautions based on patient history or medication use.    During Procedure: Verification of correct person, site, and procedure occurs any time the responsibility for care of the patient is transferred to another member of the care team.    The following medication was given:     MEDICATION:  Gentamicin  ROUTE: IM  SITE: Ventrogluteal - Right  DOSE: 80 mg  LOT #: [id number]  :  [employer]  EXPIRATION DATE: 12/24  NDC#: 44737-409-67   Was there drug waste? No    Prior to injection, verified patient identity using patient's name and date of birth.  Due to injection administration, patient instructed to remain in clinic for 15 minutes  afterwards, and to report any adverse reaction to me immediately.    Drug Amount Wasted:  None.  Vial/Syringe: Single dose vial    The following medication was given:     MEDICATION:  Lidocaine without epinephrine 1%  ROUTE: administered by physician - prostate nerve block   SITE: administered by physician - prostate nerve block    DOSE: 10 mL  LOT #: [id number]  : [employer]  EXPIRATION DATE: 04/27  NDC#: 46895-699-66   Was there drug waste? Yes  Amount of drug waste (mL): 20 mL.  Reason for waste:  Single use vial    Prior to injection, verified patient identity using patient's name and date of birth.  Due to injection administration, patient instructed to remain in clinic for 15 minutes  afterwards, and to report any adverse reaction to me immediately.    Drug Amount Wasted:  Yes: 20 mL (10 mg/ml )  Vial/Syringe: Single dose vial    Roseline Dia  1/17/2024  3:01 PM    PREPROCEDURE DIAGNOSIS: Elevated PSA   POSTPROCEDURE DIAGNOSIS: Elevated PSA  PROCEDURE: Transrectal ultrasound sizing and transrectal ultrasound guided prostatic needle biopsy, including MRI fusion targeting  for Shon Sargent who is a 63 year old male.   SURGEON: Elizabeth Diop MD   ANESTHESIA: 5 mL of 1% periprostatic block bilaterally   We previously obtained an MRI of the prostate and identified all PIRADS 3-5 lesions for targeting    DESCRIPTION OF PROCEDURE: The procedure, the outcome, the anesthesia, and the risks were discussed with the patient. Informed consent was obtained and signed and a timeout was completed prior to the procedure. Digital rectal examination was performed with the below findings noted. Anesthesia was administered as noted above and the  transrectal ultrasound probe was inserted, sizing was performed, and the below findings were noted. 2 core biopsies were taken from each of the MRI targets, and 12 core biopsies were taken as described below. The probe was then withdrawn. Patient tolerated the procedure well.   FINDINGS: Digital rectal exam reveals normal prostate. US images were obtained and then fused with the MRI images.  The fused images were then used to guide the biopsy of the targeted lesions with 2 cores taken of each lesion.  We then performed random biopsies.  12 cores were taken with 6 on each side, base, mid and apex.      Elizabeth Diop MD   Department of Urology   [facility]

## 2024-01-22 LAB
PATH REPORT.COMMENTS IMP SPEC: ABNORMAL
PATH REPORT.COMMENTS IMP SPEC: YES
PATH REPORT.FINAL DX SPEC: ABNORMAL
PATH REPORT.GROSS SPEC: ABNORMAL
PATH REPORT.MICROSCOPIC SPEC OTHER STN: ABNORMAL
PATH REPORT.RELEVANT HX SPEC: ABNORMAL
PHOTO IMAGE: ABNORMAL

## 2024-01-24 ENCOUNTER — VIRTUAL VISIT (OUTPATIENT)
Dept: UROLOGY | Facility: CLINIC | Age: 64
End: 2024-01-24
Payer: COMMERCIAL

## 2024-01-24 VITALS — BODY MASS INDEX: 34.44 KG/M2 | WEIGHT: 240 LBS

## 2024-01-24 DIAGNOSIS — C61 PROSTATE CANCER (H): Primary | ICD-10-CM

## 2024-01-24 PROCEDURE — 99214 OFFICE O/P EST MOD 30 MIN: CPT | Mod: 95 | Performed by: UROLOGY

## 2024-01-24 ASSESSMENT — PAIN SCALES - GENERAL: PAINLEVEL: NO PAIN (0)

## 2024-01-24 NOTE — PROGRESS NOTES
"Virtual Visit Details    Type of service:  Video Visit     Originating Location (pt. Location): Home    Distant Location (provider location):  On-site  Platform used for Video Visit: [facility]      Urology Clinic     HPI  Shon Sargent is a 63 year old male with newly diagnosed prostate cancer, here for follow-up after his prostate biopsy.      The patient denies any major issues after the biopsy     No changes to health, hospitalizations or new diagnoses in the interim    PHYSICAL EXAM  Vitals:  No vitals were obtained today due to virtual visit.    Physical Exam   GENERAL: Healthy, alert and no distress  EYES: Eyes grossly normal to inspection.  No discharge or erythema, or obvious scleral/conjunctival abnormalities.  RESP: No audible wheeze, cough, or visible cyanosis.  No visible retractions or increased work of breathing.    SKIN: Visible skin clear. No significant rash, abnormal pigmentation or lesions.  NEURO: Cranial nerves grossly intact.  Mentation and speech appropriate for age.  PSYCH: Mentation appears normal, affect normal/bright, judgement and insight intact, normal speech and appearance well-groomed.      Labs  Lab Results   Component Value Date    WBC 5.9 06/29/2023    WBC 7.8 09/02/2019     Lab Results   Component Value Date    RBC 5.29 06/29/2023    RBC 4.76 09/02/2019     Lab Results   Component Value Date    HGB 14.5 06/29/2023    HGB 13.8 09/02/2019     Lab Results   Component Value Date    HCT 45.5 06/29/2023    HCT 42.8 09/02/2019     No components found for: \"MCT\"  Lab Results   Component Value Date    MCV 86 06/29/2023    MCV 90 09/02/2019     Lab Results   Component Value Date    MCH 27.4 06/29/2023    MCH 29.0 09/02/2019     Lab Results   Component Value Date    MCHC 31.9 06/29/2023    MCHC 32.2 09/02/2019     Lab Results   Component Value Date    RDW 14.4 06/29/2023    RDW 12.8 09/02/2019     Lab Results   Component Value Date     06/29/2023     09/02/2019        Last " Comprehensive Metabolic Panel:  Sodium   Date Value Ref Range Status   06/29/2023 141 136 - 145 mmol/L Final   09/02/2019 142 133 - 144 mmol/L Final     Potassium   Date Value Ref Range Status   06/29/2023 4.2 3.4 - 5.3 mmol/L Final   06/20/2022 3.9 3.4 - 5.3 mmol/L Final   09/02/2019 4.5 3.4 - 5.3 mmol/L Final     Chloride   Date Value Ref Range Status   06/29/2023 107 98 - 107 mmol/L Final   06/20/2022 108 94 - 109 mmol/L Final   09/02/2019 112 (H) 94 - 109 mmol/L Final     Carbon Dioxide   Date Value Ref Range Status   09/02/2019 23 20 - 32 mmol/L Final     Carbon Dioxide (CO2)   Date Value Ref Range Status   06/29/2023 22 22 - 29 mmol/L Final   06/20/2022 24 20 - 32 mmol/L Final     Anion Gap   Date Value Ref Range Status   06/29/2023 12 7 - 15 mmol/L Final   06/20/2022 6 3 - 14 mmol/L Final   09/02/2019 7 3 - 14 mmol/L Final     Glucose   Date Value Ref Range Status   06/29/2023 114 (H) 70 - 99 mg/dL Final   06/20/2022 96 70 - 99 mg/dL Final   09/02/2019 109 (H) 70 - 99 mg/dL Final     Urea Nitrogen   Date Value Ref Range Status   06/29/2023 18.8 8.0 - 23.0 mg/dL Final   06/20/2022 16 7 - 30 mg/dL Final   09/02/2019 33 (H) 7 - 30 mg/dL Final     Creatinine   Date Value Ref Range Status   06/29/2023 0.97 0.67 - 1.17 mg/dL Final   09/02/2019 0.85 0.66 - 1.25 mg/dL Final     GFR Estimate   Date Value Ref Range Status   06/29/2023 89 >60 mL/min/1.73m2 Final   09/02/2019 >90 >60 mL/min/[1.73_m2] Final     Comment:     Non  GFR Calc  Starting 12/18/2018, serum creatinine based estimated GFR (eGFR) will be   calculated using the Chronic Kidney Disease Epidemiology Collaboration   (CKD-EPI) equation.       Calcium   Date Value Ref Range Status   06/29/2023 9.3 8.8 - 10.2 mg/dL Final   09/02/2019 7.9 (L) 8.5 - 10.1 mg/dL Final     Bilirubin Total   Date Value Ref Range Status   06/29/2023 0.6 <=1.2 mg/dL Final   09/02/2019 0.5 0.2 - 1.3 mg/dL Final     Alkaline Phosphatase   Date Value Ref Range Status    06/29/2023 91 40 - 129 U/L Final   09/02/2019 76 40 - 150 U/L Final     ALT   Date Value Ref Range Status   06/29/2023 23 0 - 70 U/L Final     Comment:     Reference intervals for this test were updated on 6/12/2023 to more accurately reflect our healthy population. There may be differences in the flagging of prior results with similar values performed with this method. Interpretation of those prior results can be made in the context of the updated reference intervals.     09/02/2019 25 0 - 70 U/L Final     AST   Date Value Ref Range Status   06/29/2023 25 0 - 45 U/L Final     Comment:     Reference intervals for this test were updated on 6/12/2023 to more accurately reflect our healthy population. There may be differences in the flagging of prior results with similar values performed with this method. Interpretation of those prior results can be made in the context of the updated reference intervals.   09/02/2019 16 0 - 45 U/L Final       PSA   Date Value Ref Range Status   12/27/2019 4.76 (H) 0 - 4 ug/L Final     Comment:     Assay Method:  Chemiluminescence using Siemens Vista analyzer     Prostate Specific Antigen Screen   Date Value Ref Range Status   06/29/2023 5.11 (H) 0.00 - 4.50 ng/mL Final   06/20/2022 4.58 (H) 0.00 - 4.00 ug/L Final     PSA Tumor Marker   Date Value Ref Range Status   10/10/2023 5.27 (H) 0.00 - 4.50 ng/mL Final        Surgical pathology  Final Diagnosis   A. Prostate, left base, biopsy:  - Prostatic adenocarcinoma, grade group 2 ([place] score 3+4), pattern 4:20%   Involves 2 of 3 cores, 70% of the tissue       B. Prostate, left mid, core biopsy:   - Prostatic adenocarcinoma with ductal differentiation, grade group 2 ([place] score 3+4), pattern 4:20%   Involves 3 of 3 cores, 40% of the tissue       C. Prostate, left apex, core biopsy:   - Benign prostatic tissue     D. Prostate, right base, core biopsy:   - Benign prostatic tissue     E. Prostate, right mid, core biopsy:   - Benign  prostatic tissue     F. Prostate, left apex, core biopsy:   - Benign prostatic tissue     G. Prostate, target 1, biopsy:  - Prostatic adenocarcinoma, grade group 2 ([place] score 3+4), pattern 4:20%   Involves 2 of 2 cores, 80% of the tissue     Electronically signed by Maynor Olmos MD on 1/22/2024 at  3:53 PM       Imaging   MR prostate 11/24/23     FINDINGS:  Size: 4.4 x 4.3 x 5.6 cm- 55 grams  Hemorrhage: Absent  Peripheral zone: Homogeneously hyperintense on T2-weighted images.  Suspicious lesions as detailed below.  Transition zone: Enlarged with BPH changes. No suspicious lesions  identified.     Lesion(s) in rank order of severity (highest score- to lowest score,  then by size)      Lesion 1:  Location: Left base peripheral zone at the 5 o'clock position relative  to the urethra. Series 7 image 46.   Additional prostate regions involved: None  Size: 13 x 10 mm  T2 description: Circumscribed moderate T2 hypointensity  T2 numerical assessment: 4  DWI description: Marked hypointensity on ADC and hyperintensity on DWI  DWI numerical assessment: 4  DCE assessment: Positive    Prostate margin: Capsular abutment 6-15 mm with focal bulging and  capsular irregularity   Lesion overall PI-RADS category: 4     Neurovascular bundles: No neurovascular bundle involvement by  malignancy.    Seminal vesicles: No seminal vesicle involvement by malignancy.   Lymph nodes: No lymph node involvement   Bones: No suspicious lesions. Artifact from right hip prosthesis  limits evaluation.   Other pelvic organs: No additional findings.                                                                      IMPRESSION:  1. Based on the most suspicious abnormality, this exam is  characterized as PIRADS 4 - Clinically significant cancer is likely to  be present.  The most suspicious abnormality is located at the left  base peripheral zone at the 5 o'clock position relative to the urethra  and there is high suspicion of extraprostatic  extension.  2. No suspicious adenopathy or evidence of pelvic metastases.       ASSESSMENT AND PLAN  63 year old male with intermediate risk prostate cancer       We had an extensive discussion about the significance of localized, prostate cancer. We discussed the options for treatment of a localized prostate cancer including active surveillance, brachytherapy, external beam radiation therapy, and surgical removal.     Active surveillance could be offered to patients with intermediate-risk prostate cancer. However, there is a need for regular monitoring and the high possibility of discontinuation as a result of a higher rate of progression. Available data indicate that 5-year survival rates between intermediate- and low-risk patients do not differ but 10-year survival rates are worse (risk of progression, metastasis and death ~ 50% higher at 10 years).  In his case, the presence of ductal differentiation makes me hesitant to offer active surveillance is the preferred approach.     We discussed the relative merits of robotic assisted radical prostatectomy. We also discussed the advantages and disadvantages and roles of open surgery vs. laparoscopic (and Da Vero assisted) surgery. The anticipated post-operative course was explained, including an anticipated 1-2 day hospital stay. Catheter will remain in place 10-14 days.  The risks, benefits, alternatives, and personnel involved in the procedure were discussed. Specifically, we discussed that risks include but are not limited to anesthetic complications including stroke, MI, DVT/PE, as well as risk of bleeding, bowel injury, infection, lymphocele, urine leak and other potentially unforseen complications. Also discussed the risk of bladder neck contracture and other urinary symptoms after procedure. In addition, we discussed risk of urinary incontinence and erectile dysfunction following the procedure. Finally, we discussed risk for adverse pathologic features,  including positive surgical margins and potential for post-surgical radiation, hormone ablation and long-term need for PSA monitoring,.  All questions were answered in detail.     We discussed that there was no clear evidence of advantage between surgery and radiation with regard to risk of recurrence.  We discussed short and long-term side effects of radiation including but not limited to radiation cystitis and proctitis.  We also discussed the suboptimal outcome of salvage prostatectomy.    Patient is leaning towards a surgery but would like a few more days before making a final decision.      Plan   Patient to call with his decision    30 total minutes spent on the date of the encounter including direct interaction with the patient, performing chart review, documentation and further activities as noted above    Elizabeth Diop MD   Department of Urology   [facility]

## 2024-01-24 NOTE — LETTER
"1/24/2024       RE: Shon Sargent  [phone]  [facility] [address]     Dear Colleague,    Thank you for referring your patient, Shon Sargent, to the [facility] [place] at [facility]. Please see a copy of my visit note below.    Virtual Visit Details    Type of service:  Video Visit     Originating Location (pt. Location): Home    Distant Location (provider location):  On-site  Platform used for Video Visit: [facility]      Urology Clinic     HPI  Shon Sargent is a 63 year old male with newly diagnosed prostate cancer, here for follow-up after his prostate biopsy.      The patient denies any major issues after the biopsy     No changes to health, hospitalizations or new diagnoses in the interim    PHYSICAL EXAM  Vitals:  No vitals were obtained today due to virtual visit.    Physical Exam   GENERAL: Healthy, alert and no distress  EYES: Eyes grossly normal to inspection.  No discharge or erythema, or obvious scleral/conjunctival abnormalities.  RESP: No audible wheeze, cough, or visible cyanosis.  No visible retractions or increased work of breathing.    SKIN: Visible skin clear. No significant rash, abnormal pigmentation or lesions.  NEURO: Cranial nerves grossly intact.  Mentation and speech appropriate for age.  PSYCH: Mentation appears normal, affect normal/bright, judgement and insight intact, normal speech and appearance well-groomed.      Labs  Lab Results   Component Value Date    WBC 5.9 06/29/2023    WBC 7.8 09/02/2019     Lab Results   Component Value Date    RBC 5.29 06/29/2023    RBC 4.76 09/02/2019     Lab Results   Component Value Date    HGB 14.5 06/29/2023    HGB 13.8 09/02/2019     Lab Results   Component Value Date    HCT 45.5 06/29/2023    HCT 42.8 09/02/2019     No components found for: \"MCT\"  Lab Results   Component Value Date    MCV 86 06/29/2023    MCV 90 09/02/2019     Lab Results   Component Value Date    MCH 27.4 " 06/29/2023    MCH 29.0 09/02/2019     Lab Results   Component Value Date    MCHC 31.9 06/29/2023    MCHC 32.2 09/02/2019     Lab Results   Component Value Date    RDW 14.4 06/29/2023    RDW 12.8 09/02/2019     Lab Results   Component Value Date     06/29/2023     09/02/2019        Last Comprehensive Metabolic Panel:  Sodium   Date Value Ref Range Status   06/29/2023 141 136 - 145 mmol/L Final   09/02/2019 142 133 - 144 mmol/L Final     Potassium   Date Value Ref Range Status   06/29/2023 4.2 3.4 - 5.3 mmol/L Final   06/20/2022 3.9 3.4 - 5.3 mmol/L Final   09/02/2019 4.5 3.4 - 5.3 mmol/L Final     Chloride   Date Value Ref Range Status   06/29/2023 107 98 - 107 mmol/L Final   06/20/2022 108 94 - 109 mmol/L Final   09/02/2019 112 (H) 94 - 109 mmol/L Final     Carbon Dioxide   Date Value Ref Range Status   09/02/2019 23 20 - 32 mmol/L Final     Carbon Dioxide (CO2)   Date Value Ref Range Status   06/29/2023 22 22 - 29 mmol/L Final   06/20/2022 24 20 - 32 mmol/L Final     Anion Gap   Date Value Ref Range Status   06/29/2023 12 7 - 15 mmol/L Final   06/20/2022 6 3 - 14 mmol/L Final   09/02/2019 7 3 - 14 mmol/L Final     Glucose   Date Value Ref Range Status   06/29/2023 114 (H) 70 - 99 mg/dL Final   06/20/2022 96 70 - 99 mg/dL Final   09/02/2019 109 (H) 70 - 99 mg/dL Final     Urea Nitrogen   Date Value Ref Range Status   06/29/2023 18.8 8.0 - 23.0 mg/dL Final   06/20/2022 16 7 - 30 mg/dL Final   09/02/2019 33 (H) 7 - 30 mg/dL Final     Creatinine   Date Value Ref Range Status   06/29/2023 0.97 0.67 - 1.17 mg/dL Final   09/02/2019 0.85 0.66 - 1.25 mg/dL Final     GFR Estimate   Date Value Ref Range Status   06/29/2023 89 >60 mL/min/1.73m2 Final   09/02/2019 >90 >60 mL/min/[1.73_m2] Final     Comment:     Non  GFR Calc  Starting 12/18/2018, serum creatinine based estimated GFR (eGFR) will be   calculated using the Chronic Kidney Disease Epidemiology Collaboration   (CKD-EPI) equation.        Calcium   Date Value Ref Range Status   06/29/2023 9.3 8.8 - 10.2 mg/dL Final   09/02/2019 7.9 (L) 8.5 - 10.1 mg/dL Final     Bilirubin Total   Date Value Ref Range Status   06/29/2023 0.6 <=1.2 mg/dL Final   09/02/2019 0.5 0.2 - 1.3 mg/dL Final     Alkaline Phosphatase   Date Value Ref Range Status   06/29/2023 91 40 - 129 U/L Final   09/02/2019 76 40 - 150 U/L Final     ALT   Date Value Ref Range Status   06/29/2023 23 0 - 70 U/L Final     Comment:     Reference intervals for this test were updated on 6/12/2023 to more accurately reflect our healthy population. There may be differences in the flagging of prior results with similar values performed with this method. Interpretation of those prior results can be made in the context of the updated reference intervals.     09/02/2019 25 0 - 70 U/L Final     AST   Date Value Ref Range Status   06/29/2023 25 0 - 45 U/L Final     Comment:     Reference intervals for this test were updated on 6/12/2023 to more accurately reflect our healthy population. There may be differences in the flagging of prior results with similar values performed with this method. Interpretation of those prior results can be made in the context of the updated reference intervals.   09/02/2019 16 0 - 45 U/L Final       PSA   Date Value Ref Range Status   12/27/2019 4.76 (H) 0 - 4 ug/L Final     Comment:     Assay Method:  Chemiluminescence using Siemens Vista analyzer     Prostate Specific Antigen Screen   Date Value Ref Range Status   06/29/2023 5.11 (H) 0.00 - 4.50 ng/mL Final   06/20/2022 4.58 (H) 0.00 - 4.00 ug/L Final     PSA Tumor Marker   Date Value Ref Range Status   10/10/2023 5.27 (H) 0.00 - 4.50 ng/mL Final        Surgical pathology  Final Diagnosis   A. Prostate, left base, biopsy:  - Prostatic adenocarcinoma, grade group 2 ([place] score 3+4), pattern 4:20%   Involves 2 of 3 cores, 70% of the tissue       B. Prostate, left mid, core biopsy:   - Prostatic adenocarcinoma with ductal  differentiation, grade group 2 ([place] score 3+4), pattern 4:20%   Involves 3 of 3 cores, 40% of the tissue       C. Prostate, left apex, core biopsy:   - Benign prostatic tissue     D. Prostate, right base, core biopsy:   - Benign prostatic tissue     E. Prostate, right mid, core biopsy:   - Benign prostatic tissue     F. Prostate, left apex, core biopsy:   - Benign prostatic tissue     G. Prostate, target 1, biopsy:  - Prostatic adenocarcinoma, grade group 2 ([place] score 3+4), pattern 4:20%   Involves 2 of 2 cores, 80% of the tissue     Electronically signed by Maynor Olmos MD on 1/22/2024 at  3:53 PM       Imaging   MR prostate 11/24/23     FINDINGS:  Size: 4.4 x 4.3 x 5.6 cm- 55 grams  Hemorrhage: Absent  Peripheral zone: Homogeneously hyperintense on T2-weighted images.  Suspicious lesions as detailed below.  Transition zone: Enlarged with BPH changes. No suspicious lesions  identified.     Lesion(s) in rank order of severity (highest score- to lowest score,  then by size)      Lesion 1:  Location: Left base peripheral zone at the 5 o'clock position relative  to the urethra. Series 7 image 46.   Additional prostate regions involved: None  Size: 13 x 10 mm  T2 description: Circumscribed moderate T2 hypointensity  T2 numerical assessment: 4  DWI description: Marked hypointensity on ADC and hyperintensity on DWI  DWI numerical assessment: 4  DCE assessment: Positive    Prostate margin: Capsular abutment 6-15 mm with focal bulging and  capsular irregularity   Lesion overall PI-RADS category: 4     Neurovascular bundles: No neurovascular bundle involvement by  malignancy.    Seminal vesicles: No seminal vesicle involvement by malignancy.   Lymph nodes: No lymph node involvement   Bones: No suspicious lesions. Artifact from right hip prosthesis  limits evaluation.   Other pelvic organs: No additional findings.                                                                      IMPRESSION:  1. Based on the most  suspicious abnormality, this exam is  characterized as PIRADS 4 - Clinically significant cancer is likely to  be present.  The most suspicious abnormality is located at the left  base peripheral zone at the 5 o'clock position relative to the urethra  and there is high suspicion of extraprostatic extension.  2. No suspicious adenopathy or evidence of pelvic metastases.       ASSESSMENT AND PLAN  63 year old male with intermediate risk prostate cancer       We had an extensive discussion about the significance of localized, prostate cancer. We discussed the options for treatment of a localized prostate cancer including active surveillance, brachytherapy, external beam radiation therapy, and surgical removal.     Active surveillance could be offered to patients with intermediate-risk prostate cancer. However, there is a need for regular monitoring and the high possibility of discontinuation as a result of a higher rate of progression. Available data indicate that 5-year survival rates between intermediate- and low-risk patients do not differ but 10-year survival rates are worse (risk of progression, metastasis and death ~ 50% higher at 10 years).  In his case, the presence of ductal differentiation makes me hesitant to offer active surveillance is the preferred approach.     We discussed the relative merits of robotic assisted radical prostatectomy. We also discussed the advantages and disadvantages and roles of open surgery vs. laparoscopic (and Da Vero assisted) surgery. The anticipated post-operative course was explained, including an anticipated 1-2 day hospital stay. Catheter will remain in place 10-14 days.  The risks, benefits, alternatives, and personnel involved in the procedure were discussed. Specifically, we discussed that risks include but are not limited to anesthetic complications including stroke, MI, DVT/PE, as well as risk of bleeding, bowel injury, infection, lymphocele, urine leak and other  potentially unforseen complications. Also discussed the risk of bladder neck contracture and other urinary symptoms after procedure. In addition, we discussed risk of urinary incontinence and erectile dysfunction following the procedure. Finally, we discussed risk for adverse pathologic features, including positive surgical margins and potential for post-surgical radiation, hormone ablation and long-term need for PSA monitoring,.  All questions were answered in detail.     We discussed that there was no clear evidence of advantage between surgery and radiation with regard to risk of recurrence.  We discussed short and long-term side effects of radiation including but not limited to radiation cystitis and proctitis.  We also discussed the suboptimal outcome of salvage prostatectomy.    Patient is leaning towards a surgery but would like a few more days before making a final decision.      Plan   Patient to call with his decision    30 total minutes spent on the date of the encounter including direct interaction with the patient, performing chart review, documentation and further activities as noted above    Elizabeth Diop MD   Department of Urology   [facility]

## 2024-01-24 NOTE — NURSING NOTE
Is the patient currently in the state of [place]? YES    Visit mode:VIDEO    If the visit is dropped, the patient can be reconnected by: VIDEO VISIT: Text to cell phone:   Telephone Information:   Mobile [phone]       Will anyone else be joining the visit? NO  (If patient encounters technical issues they should call [phone])    How would you like to obtain your AVS? MyChart    Are changes needed to the allergy or medication list? Pt stated no changes to allergies and Pt stated no med changes    Reason for visit: Follow Up    Tamar PEREA

## 2024-01-25 ENCOUNTER — PATIENT OUTREACH (OUTPATIENT)
Dept: UROLOGY | Facility: CLINIC | Age: 64
End: 2024-01-25

## 2024-01-25 NOTE — TELEPHONE ENCOUNTER
Pt reached out to writer stating he thought about the options after his meeting with Dr Diop and he has elected to proceed with surgery.     Writer informed him that this will be shared with the provider who will then put in orders. The next step will be to hear from the surgery scheduler for a date. Pt expressed understanding.     Pre-Op Teaching Flowsheet       Pre and Post op Patient Education  Relevant Diagnosis:  prostate cancer  Surgical procedure:  prostatectomy  Teaching Topic:  Pre and post op teaching  Person Involved in teaching: Yes    Motivation Level:  Asks Questions: Yes  Eager to Learn: Yes  Cooperative: Yes  Receptive (willing/able to accept information):  Yes    Patient demonstrates understanding of the following:  Date of surgery:  TBD  Location of surgery:  TBD  History and Physical and any other testing necessary prior to surgery: Yes  Required timeline for completion of History and Physical and any pre-op testing: Yes    Patient demonstrates understanding of the following:  Pre-op bowel prep:  N/A  Pre-op showering/scrub information with PCMX Soap: Yes  Blood thinner medications discussed and when to stop (if applicable):  Yes  Discussed no visitors at this time due to increase Covid-19 cases and how we need to make sure everyone stays safe.    Infection Prevention:   Patient demonstrates understanding of the following:  Surgical procedure site care taught: Yes  Signs and symptoms of infection taught: Yes      Post-op follow-up:  Discussed how to contact the hospital, nurse, and clinic scheduling staff if necessary. (See packet information)    Instructional materials used/given/mailed:  [facility] Packet, post op teaching sheet, Map, Soap, and with the arrival/location information to come closer to the surgery date.    Surgical instructions packet given to patient in office: will be mailed once date provided    Follow up: Discussed arranging for someone to drive you home. ( No public  transportation)  Yes  Someone needed to stay the first twenty hours after surgery: Yes     referral: N/A     home:  Yes    Care Giver:  yes    PCP:  yes    Will continue to follow as needed

## 2024-01-31 ENCOUNTER — TELEPHONE (OUTPATIENT)
Dept: UROLOGY | Facility: CLINIC | Age: 64
End: 2024-01-31

## 2024-01-31 DIAGNOSIS — C61 PROSTATE CANCER (H): Primary | ICD-10-CM

## 2024-01-31 RX ORDER — CEFAZOLIN SODIUM 2 G/50ML
2 SOLUTION INTRAVENOUS
Status: CANCELLED | OUTPATIENT
Start: 2024-01-31

## 2024-01-31 RX ORDER — HEPARIN SODIUM 5000 [USP'U]/.5ML
5000 INJECTION, SOLUTION INTRAVENOUS; SUBCUTANEOUS
Status: CANCELLED | OUTPATIENT
Start: 2024-01-31

## 2024-01-31 RX ORDER — CEFAZOLIN SODIUM 2 G/50ML
2 SOLUTION INTRAVENOUS SEE ADMIN INSTRUCTIONS
Status: CANCELLED | OUTPATIENT
Start: 2024-01-31

## 2024-01-31 NOTE — TELEPHONE ENCOUNTER
Called and spoke with the patient to schedule surgery with Dr. Diop. Surgery scheduled for 2/29 at [facility] with Dr. Diop. Offered earlier dates at the [facility] but patient declined.    H&P with PAC scheduled for 2/13. Patient is aware he will receive his arrival time for surgery at PAC appointment.    Patient will need a 10 day face to face post-op appointment per provider.    Writer will mail surgery packet.    Patient has no further questions at this time.     Joi Steiner on 1/31/2024 at 8:53 AM

## 2024-02-01 NOTE — TELEPHONE ENCOUNTER
FUTURE VISIT INFORMATION      SURGERY INFORMATION:  Date: 24  Location:  OR  Surgeon:  Elizabeth Diop MD   Anesthesia Type:  general  Procedure: PROSTATECTOMY, ROBOT-ASSISTED, WITH PELVIC LYMPHADENECTOMY   Consult: virtual visit 24    RECORDS REQUESTED FROM:       Primary Care Provider: [facility]    Pertinent Medical History: hypertension    Most recent EKG+ Tracin21- [facility]

## 2024-02-05 ENCOUNTER — TELEPHONE (OUTPATIENT)
Dept: UROLOGY | Facility: CLINIC | Age: 64
End: 2024-02-05

## 2024-02-06 NOTE — TELEPHONE ENCOUNTER
Called patient and offered to move surgery from 2/29 at [facility] to 2/22. Patient states he has a trip planned out of the state and his daughter already took time off for the surgery on 2/29. He would like to keep his surgery on 2/29.    No further questions from the patient at this time.    Joi Steiner on 2/6/2024 at 1:51 PM

## 2024-02-12 LAB
ABO/RH(D): NORMAL
ANTIBODY SCREEN: NEGATIVE
SPECIMEN EXPIRATION DATE: NORMAL

## 2024-02-13 ENCOUNTER — PRE VISIT (OUTPATIENT)
Dept: SURGERY | Facility: CLINIC | Age: 64
End: 2024-02-13

## 2024-02-13 ENCOUNTER — OFFICE VISIT (OUTPATIENT)
Dept: SURGERY | Facility: CLINIC | Age: 64
End: 2024-02-13
Payer: COMMERCIAL

## 2024-02-13 ENCOUNTER — LAB (OUTPATIENT)
Dept: LAB | Facility: CLINIC | Age: 64
End: 2024-02-13
Payer: COMMERCIAL

## 2024-02-13 ENCOUNTER — ANESTHESIA EVENT (OUTPATIENT)
Dept: SURGERY | Facility: CLINIC | Age: 64
DRG: 708 | End: 2024-02-13
Payer: COMMERCIAL

## 2024-02-13 VITALS
RESPIRATION RATE: 16 BRPM | HEIGHT: 71 IN | BODY MASS INDEX: 35.14 KG/M2 | HEART RATE: 80 BPM | TEMPERATURE: 98 F | SYSTOLIC BLOOD PRESSURE: 143 MMHG | WEIGHT: 251 LBS | OXYGEN SATURATION: 98 % | DIASTOLIC BLOOD PRESSURE: 93 MMHG

## 2024-02-13 DIAGNOSIS — Z01.818 PRE-OP EVALUATION: ICD-10-CM

## 2024-02-13 DIAGNOSIS — Z01.818 PRE-OP EVALUATION: Primary | ICD-10-CM

## 2024-02-13 DIAGNOSIS — Z91.89 AT RISK FOR OBSTRUCTIVE SLEEP APNEA: ICD-10-CM

## 2024-02-13 DIAGNOSIS — C61 PROSTATE CANCER (H): ICD-10-CM

## 2024-02-13 DIAGNOSIS — E66.812 CLASS 2 SEVERE OBESITY DUE TO EXCESS CALORIES WITH SERIOUS COMORBIDITY AND BODY MASS INDEX (BMI) OF 35.0 TO 35.9 IN ADULT (H): ICD-10-CM

## 2024-02-13 DIAGNOSIS — E66.01 CLASS 2 SEVERE OBESITY DUE TO EXCESS CALORIES WITH SERIOUS COMORBIDITY AND BODY MASS INDEX (BMI) OF 35.0 TO 35.9 IN ADULT (H): ICD-10-CM

## 2024-02-13 LAB
ALBUMIN SERPL BCG-MCNC: 4.4 G/DL (ref 3.5–5.2)
ALBUMIN UR-MCNC: NEGATIVE MG/DL
ALP SERPL-CCNC: 98 U/L (ref 40–150)
ALT SERPL W P-5'-P-CCNC: 20 U/L (ref 0–70)
ANION GAP SERPL CALCULATED.3IONS-SCNC: 9 MMOL/L (ref 7–15)
APPEARANCE UR: CLEAR
AST SERPL W P-5'-P-CCNC: 22 U/L (ref 0–45)
BASOPHILS # BLD AUTO: 0 10E3/UL (ref 0–0.2)
BASOPHILS NFR BLD AUTO: 0 %
BILIRUB SERPL-MCNC: 0.6 MG/DL
BILIRUB UR QL STRIP: NEGATIVE
BUN SERPL-MCNC: 12.7 MG/DL (ref 8–23)
CALCIUM SERPL-MCNC: 9.6 MG/DL (ref 8.8–10.2)
CHLORIDE SERPL-SCNC: 106 MMOL/L (ref 98–107)
COLOR UR AUTO: ABNORMAL
CREAT SERPL-MCNC: 0.99 MG/DL (ref 0.67–1.17)
DEPRECATED HCO3 PLAS-SCNC: 25 MMOL/L (ref 22–29)
EGFRCR SERPLBLD CKD-EPI 2021: 86 ML/MIN/1.73M2
EOSINOPHIL # BLD AUTO: 0.1 10E3/UL (ref 0–0.7)
EOSINOPHIL NFR BLD AUTO: 2 %
ERYTHROCYTE [DISTWIDTH] IN BLOOD BY AUTOMATED COUNT: 13.6 % (ref 10–15)
GLUCOSE SERPL-MCNC: 103 MG/DL (ref 70–99)
GLUCOSE UR STRIP-MCNC: NEGATIVE MG/DL
HCT VFR BLD AUTO: 45.7 % (ref 40–53)
HGB BLD-MCNC: 14.9 G/DL (ref 13.3–17.7)
HGB UR QL STRIP: ABNORMAL
IMM GRANULOCYTES # BLD: 0 10E3/UL
IMM GRANULOCYTES NFR BLD: 0 %
KETONES UR STRIP-MCNC: NEGATIVE MG/DL
LEUKOCYTE ESTERASE UR QL STRIP: NEGATIVE
LYMPHOCYTES # BLD AUTO: 1.7 10E3/UL (ref 0.8–5.3)
LYMPHOCYTES NFR BLD AUTO: 23 %
MCH RBC QN AUTO: 27.6 PG (ref 26.5–33)
MCHC RBC AUTO-ENTMCNC: 32.6 G/DL (ref 31.5–36.5)
MCV RBC AUTO: 85 FL (ref 78–100)
MONOCYTES # BLD AUTO: 0.6 10E3/UL (ref 0–1.3)
MONOCYTES NFR BLD AUTO: 9 %
MUCOUS THREADS #/AREA URNS LPF: PRESENT /LPF
NEUTROPHILS # BLD AUTO: 4.7 10E3/UL (ref 1.6–8.3)
NEUTROPHILS NFR BLD AUTO: 66 %
NITRATE UR QL: NEGATIVE
NRBC # BLD AUTO: 0 10E3/UL
NRBC BLD AUTO-RTO: 0 /100
PH UR STRIP: 5.5 [PH] (ref 5–7)
PLATELET # BLD AUTO: 231 10E3/UL (ref 150–450)
POTASSIUM SERPL-SCNC: 4.5 MMOL/L (ref 3.4–5.3)
PROT SERPL-MCNC: 7.3 G/DL (ref 6.4–8.3)
RBC # BLD AUTO: 5.39 10E6/UL (ref 4.4–5.9)
RBC URINE: 3 /HPF
SODIUM SERPL-SCNC: 140 MMOL/L (ref 135–145)
SP GR UR STRIP: 1.01 (ref 1–1.03)
UROBILINOGEN UR STRIP-MCNC: NORMAL MG/DL
WBC # BLD AUTO: 7.2 10E3/UL (ref 4–11)
WBC URINE: 1 /HPF

## 2024-02-13 PROCEDURE — 99000 SPECIMEN HANDLING OFFICE-LAB: CPT | Performed by: PATHOLOGY

## 2024-02-13 PROCEDURE — 99203 OFFICE O/P NEW LOW 30 MIN: CPT | Performed by: PHYSICIAN ASSISTANT

## 2024-02-13 PROCEDURE — 86900 BLOOD TYPING SEROLOGIC ABO: CPT | Performed by: PHYSICIAN ASSISTANT

## 2024-02-13 PROCEDURE — 85025 COMPLETE CBC W/AUTO DIFF WBC: CPT | Performed by: PATHOLOGY

## 2024-02-13 PROCEDURE — 36415 COLL VENOUS BLD VENIPUNCTURE: CPT | Performed by: PATHOLOGY

## 2024-02-13 PROCEDURE — 80053 COMPREHEN METABOLIC PANEL: CPT | Performed by: PATHOLOGY

## 2024-02-13 PROCEDURE — 81001 URINALYSIS AUTO W/SCOPE: CPT | Performed by: PATHOLOGY

## 2024-02-13 PROCEDURE — 87086 URINE CULTURE/COLONY COUNT: CPT | Performed by: UROLOGY

## 2024-02-13 RX ORDER — ACETAMINOPHEN 500 MG
1000 TABLET ORAL PRN
COMMUNITY

## 2024-02-13 ASSESSMENT — LIFESTYLE VARIABLES: TOBACCO_USE: 0

## 2024-02-13 ASSESSMENT — ENCOUNTER SYMPTOMS: SEIZURES: 0

## 2024-02-13 ASSESSMENT — PAIN SCALES - GENERAL: PAINLEVEL: NO PAIN (0)

## 2024-02-13 NOTE — H&P
Pre-Operative H & P     CC:  Preoperative exam to assess for increased cardiopulmonary risk while undergoing surgery and anesthesia.    Date of Encounter: 2/13/2024  Primary Care Physician:  [facility]     Reason for visit:   Encounter Diagnoses   Name Primary?    Pre-op evaluation Yes    Prostate cancer (H)     At risk for obstructive sleep apnea     Class 2 severe obesity due to excess calories with serious comorbidity and body mass index (BMI) of 35.0 to 35.9 in adult (H)        HPI  Shon Sargent is a 63 year old male who presents for pre-operative H & P in preparation for  Procedure Information       Case: [id number] Date/Time: 02/29/24 1120    Procedure: PROSTATECTOMY, ROBOT-ASSISTED, WITH PELVIC LYMPHADENECTOMY (Pelvis)    Anesthesia type: General    Diagnosis: Prostate cancer (H) [C61]    Pre-op diagnosis: Prostate cancer (H) [C61]    Location: [address] OR    Providers: Elizabeth Diop MD            Patient is being evaluated for comorbid conditions of HTN, anemia, tremor, gout, obesity, GERD, and ED.    He has a history of elevated PSA and recently underwent a prostate MRI. This showed findings concerning for prostate cancer. He subsequently underwent a prostate biopsy with Dr. Diop confirming the diagnosis. He is now scheduled for the above procedure for further management.     History is obtained from the patient and chart review    Hx of abnormal bleeding or anti-platelet use: None      Past Medical History  Past Medical History:   Diagnosis Date    ED (erectile dysfunction)     GERD (gastroesophageal reflux disease)     Gout     Hypertension     Prostate cancer (H)        Past Surgical History  Past Surgical History:   Procedure Laterality Date    COLONOSCOPY N/A 10/08/2015    Procedure: COLONOSCOPY;  Surgeon: Rolando Ayers MD;  Location: [facility]    ESOPHAGOSCOPY, GASTROSCOPY, DUODENOSCOPY (EGD), COMBINED N/A 11/03/2014    Procedure: COMBINED ESOPHAGOSCOPY,  GASTROSCOPY, DUODENOSCOPY (EGD);  Surgeon: Rolando Ayers MD;  Location: [facility]    ESOPHAGOSCOPY, GASTROSCOPY, DUODENOSCOPY (EGD), COMBINED N/A 10/08/2015    Procedure: COMBINED ESOPHAGOSCOPY, GASTROSCOPY, DUODENOSCOPY (EGD), BIOPSY SINGLE OR MULTIPLE;  Surgeon: Rolando Ayers MD;  Location: [facility]    HERNIA REPAIR  01/01/2007    umbilical    TOTAL HIP ARTHROPLASTY Right 2021       Prior to Admission Medications  Current Outpatient Medications   Medication Sig Dispense Refill    acetaminophen (TYLENOL) 500 MG tablet Take 500-1,000 mg by mouth as needed for mild pain      allopurinol (ZYLOPRIM) 300 MG tablet Take 1 tablet (300 mg) by mouth daily (Patient taking differently: Take 1 tablet by mouth every morning) 90 tablet 3    lisinopril (ZESTRIL) 30 MG tablet Take 1 tablet (30 mg) by mouth daily (Patient taking differently: Take 30 mg by mouth every morning) 90 tablet 3    omeprazole (PRILOSEC) 20 MG DR capsule Take 1 capsule (20 mg) by mouth daily (Patient taking differently: Take 20 mg by mouth every morning) 90 capsule 3    sildenafil (REVATIO) 20 MG tablet TAKE BY MOUTH AS MANY TABLETS (MAX OF 5 TABS) AS NEEDED AT A TIME PRIOR TO INTERCOURSE. 30 tablet 0    VENTOLIN  (90 Base) MCG/ACT inhaler INHALE 2 PUFFS BY MOUTH EVERY 6 HOURS AS NEEDED FOR SHORTNESS OF BREATH /DYSPNEA  OR  WHEEZING 18 g 0    Probiotic Product (FORTIFY DAILY PROBIOTIC PO) Take 1 capsule by mouth daily (Patient not taking: Reported on 1/17/2024)         Allergies  Allergies   Allergen Reactions    Valdecoxib Hives     Bextra       Social History  Social History     Socioeconomic History    Marital status: Single     Spouse name: Not on file    Number of children: Not on file    Years of education: Not on file    Highest education level: Not on file   Occupational History    Not on file   Tobacco Use    Smoking status: Never    Smokeless tobacco: Never   Vaping Use    Vaping Use: Never used   Substance and Sexual Activity    Alcohol use:  "Not Currently     Comment: occas    Drug use: No    Sexual activity: Yes     Partners: Female   Other Topics Concern    Parent/sibling w/ CABG, MI or angioplasty before 65F 55M? No   Social History Narrative    Not on file     Social Determinants of Health     Financial Resource Strain: Not on file   Food Insecurity: Not on file   Transportation Needs: Not on file   Physical Activity: Not on file   Stress: Not on file   Social Connections: Not on file   Interpersonal Safety: Not on file   Housing Stability: Not on file       Family History  Family History   Problem Relation Age of Onset    Cancer Mother         lymphoma    Hypertension Father     Circulatory Father     Gastrointestinal Disease Father     Lipids Father     Alzheimer Disease Maternal Grandfather     Anesthesia Reaction Maternal Grandfather         memory issues    Prostate Cancer Maternal Grandfather     Cardiovascular Paternal Grandfather     Heart Disease Paternal Grandfather     Asthma Son         allergy induced    Asthma Son         al;lergy induced    Coronary Artery Disease No family hx of     Colon Cancer No family hx of     Venous thrombosis No family hx of        Review of Systems  The complete review of systems is negative other than noted in the HPI or here.   Anesthesia Evaluation   Pt has had prior anesthetic. Type: General and MAC.    History of anesthetic complications  -  and PONV.  Patient states that he was \"difficult to put to sleep\" with hernia surgery >15 years ago. He also reports that he \"woke up\" during his hip surgery then was \"really knocked out and did not wake for 6 hours\"  in 2021.    ROS/MED HX  ENT/Pulmonary:     (+)     MERE risk factors, snores loudly, hypertension, obese,                             (-) tobacco use and recent URI   Neurologic: Comment: Essential Tremor, bilateral hands    (+)    peripheral neuropathy, - bilateral feet.                        (-) no seizures, no CVA and no TIA   Cardiovascular:     (+) " " hypertension- -   -  - -                                 Previous cardiac testing   Echo: Date: Results:    Stress Test:  Date: Results:    ECG Reviewed:  Date: 11/2021 Results:  Sinus rhythm  PACs  Cath:  Date: Results:      METS/Exercise Tolerance: >4 METS Comment: Active job as a , on his feet all day, heavy lifting. Denies exertional symptoms.    Hematologic:     (+)      anemia,       (-) history of blood clots and history of blood transfusion   Musculoskeletal: Comment: Gout  S/p right total hip arthroplasty 2021      GI/Hepatic:     (+) GERD, Asymptomatic on medication,               Liver disease: fatty liver.   Renal/Genitourinary: Comment: ED   (-) renal disease   Endo:     (+)               Obesity,    (-) Type II DM and thyroid disease   Psychiatric/Substance Use:  - neg psychiatric ROS     Infectious Disease:  - neg infectious disease ROS     Malignancy:   (+) Malignancy, History of Prostate.Prostate CA Active status post.      Other:  - neg other ROS          BP (!) 143/93 (BP Location: Right arm, Patient Position: Sitting, Cuff Size: Adult Large)   Pulse 80   Temp 98  F (36.7  C) (Oral)   Resp 16   Ht 1.803 m (5' 11\")   Wt 113.9 kg (251 lb)   SpO2 98%   BMI 35.01 kg/m      Physical Exam   Constitutional: Pleasant, well-appearing male, no apparent distress, and appears stated age.  Eyes: Pupils equal, round and reactive to light, extra ocular muscles intact, sclera clear, conjunctiva normal.  HENT: Normocephalic and atraumatic, oral pharynx with moist mucus membranes, good dentition. No goiter appreciated.   Respiratory: Clear to auscultation bilaterally, no crackles or wheezing.  Cardiovascular: Regular rate and rhythm, normal S1 and S2, and no murmur noted.  Carotids +2, no bruits. No edema. Palpable pulses to radial  DP and PT arteries.   GI: Obese, nondistended  Lymph/Hematologic: No cervical lymphadenopathy and no supraclavicular lymphadenopathy.  Genitourinary:  " Deferred  Skin: Warm and dry.  No rashes on exposed skin   Musculoskeletal: Full ROM of neck. There is no redness, warmth, or swelling of the visible joints. Gross motor strength is normal.    Neurologic: Awake, alert, oriented to name, place and time. Cranial nerves II-XII are grossly intact. Gait is normal.   Neuropsychiatric: Calm, cooperative. Normal affect.       Prior Labs/Diagnostic Studies   All labs and imaging personally reviewed     EKG/ stress test - if available please see in ROS above   No results found.    The patient's records and results personally reviewed by this provider.     Outside records reviewed from: Care Everywhere    LAB/DIAGNOSTIC STUDIES TODAY:  T&S    Assessment  Shon Sargent is a 63 year old male seen as a PAC referral for risk assessment and optimization for anesthesia.    Plan/Recommendations  Pt will be optimized for the proposed procedure.  See below for details on the assessment, risk, and preoperative recommendations    NEUROLOGY  - No history of TIA, CVA or seizure  - Essential tremor  Previously treated with betablocker but discontinued due to side effects  -Post Op delirium risk factors:  No risk identified    ENT  - No current airway concerns.  Will need to be reassessed day of surgery.  Mallampati: I  TM: > 3    CARDIAC  - No history of CAD and Afib  - Hypertension  Well controlled  - Patient denies other cardiac history or cardiac symptoms today.    - METS (Metabolic Equivalents)  Patient performs 4 or more METS exercise without symptoms            Total Score: 0      RCRI-Very low risk: Class 1 0.4% complication rate            Total Score: 0        PULMONARY  - Obstructive Sleep Apnea  MERE risk with no formal diagnosis. Referral for sleep medicine placed today. Given upcoming cancer surgery, discussed with patient that this can be completed after surgery.  MERE High Risk            Total Score: 7    MERE: Snores loudly    MERE: Often tired    MERE: Hypertension    MERE:  "BMI over 35 kg/m2    MERE: Over 50 ys old    MERE: Neck Circum >16 in    MERE: Male      - Patient denies history of asthma or COPD. He states the ventolin inhaler is only used when he has a cold, last use >1 year ago. No respiratory concerns today and lungs CTA on exam.  - Tobacco History    History   Smoking Status    Never   Smokeless Tobacco    Never       GI  - GERD  Controlled on medications: Proton Pump Inhibitor  PONV High Risk  Total Score: 3           1 AN PONV: Patient is not a current smoker    1 AN PONV: Patient has history of PONV    1 AN PONV: Intended Post Op Opioids        /RENAL  - Baseline Creatinine  0.97  - Prostate Cancer  Above surgery planned for further management  - ED  Hold sildenafil 24 hours prior to surgery    ENDOCRINE    - BMI: Estimated body mass index is 35.01 kg/m  as calculated from the following:    Height as of this encounter: 1.803 m (5' 11\").    Weight as of this encounter: 113.9 kg (251 lb).  Obesity (BMI >30)  - No history of Diabetes Mellitus    HEME  VTE High Risk 3%            Total Score: 8    VTE: Greater than 59 yrs old    VTE: Male    VTE: Current cancer      - No history of abnormal bleeding or antiplatelet use.  - History of anemia  CBC will be completed today  Recommend perioperative use of blood conservation techniques intraoperatively and close monitoring for postoperative bleeding.  A type and screen has been ordered for this patient    MSK  - S/p right total hip arthroplasty (2021)  Recommend consideration for careful positioning to limit patient discomfort.      Different anesthesia methods/types have been discussed with the patient, but they are aware that the final plan will be decided by the assigned anesthesia provider on the date of service.    The patient is optimized for their procedure. AVS with information on surgery time/arrival time, meds and NPO status given by nursing staff. No further diagnostic testing indicated.      On the day of service: "     Prep time: 10 minutes  Visit time: 25 minutes  Documentation time: 7 minutes  ------------------------------------------  Total time: 42 minutes      Miladys Shaw PA-C  Preoperative Assessment Center  [facility]  Clinic and Surgery Center  Phone: [phone]  Fax: [phone]

## 2024-02-13 NOTE — PATIENT INSTRUCTIONS
Name:  Shon Sargent   MRN:  [id number]   :  1960   Today's Date:  2024         You were seen today for a pre-operative assessment in the:    Pre-operative Anesthesia Assessment Center(PAC)  [facility]  [address] [place] [id number]  phone [phone]      You will be receiving a call with location, date, arrival time and diet instructions from Preadmission Nursing at your surgical site:    -[facility]: [phone]   -[facility]: [phone]  -[facility]: [phone]  -[facility]: [phone]  -[facility]: [phone]  -[facility]: [phone]  -[facility]: [phone]        Anesthesia recommendations for medications:    Hold Aspirin for 7 days before procedure.  Hold Multivitamins for 7 days before procedure.   Hold Herbal medications and Supplements for 7 days before procedure.  Hold Ibuprofen for 1 day before procedure.   Hold Naproxen for 4 days before procedure.   **Hold Sildenafil for 24 hours before surgery.    No alcohol or cannabis products for 24 hours before your procedure       Please DO NOT take the following medications the day of procedure:    Probiotic  Lisinopril    Please take these medications the day of procedure:    Tylenol if needed  Ventolin inhaler if needed  Allopurinol  Prilosec      How do I prepare myself?  - Please take 2 showers (one the night prior to surgery and one the morning of surgery) using Scrubcare or Hibiclens soap.    Use this soap only from the neck to your toes.     Leave the soap on your skin for one minute--then rinse thoroughly.      You may use your own shampoo and conditioner. No other hair products.   - Please remove all jewelry and body piercings.  - No lotions, deodorants or fragrance.  - No makeup or fingernail polish.   - Bring your ID and insurance card.    -If you have a Deep Brain Stimulator, a Spinal Cord Stimulator, or any implanted  Neuro Device, you must bring the remote to your appointment                 For further questions regarding your surgery please call your surgeon's office.

## 2024-02-14 LAB — BACTERIA UR CULT: NO GROWTH

## 2024-02-27 ENCOUNTER — PATIENT OUTREACH (OUTPATIENT)
Dept: UROLOGY | Facility: CLINIC | Age: 64
End: 2024-02-27

## 2024-02-27 NOTE — TELEPHONE ENCOUNTER
Writer reached out to pt as follow up for his inquiry about his surgery packet.     Pt expressed wanting to know his surgery time. Writer informed him of the posted surgery and arrival time. Writer also informed him that this is still subject to change. Pt expressed understanding.     Writer also reviewed his medications and what was recommended to take the morning of. Pt expressed understanding. Writer also reviewed NPO status and showering instructions.     Will continue to follow as needed.

## 2024-02-28 RX ORDER — OMEGA-3/DHA/EPA/FISH OIL 60 MG-90MG
CAPSULE ORAL
COMMUNITY
End: 2024-06-07

## 2024-02-28 RX ORDER — MULTIVITAMIN
1 TABLET ORAL DAILY
COMMUNITY
End: 2024-06-07

## 2024-02-28 NOTE — PROGRESS NOTES
PTA medications updated by Medication Scribe prior to surgery via phone call with patient (last doses completed by Nurse)     Medication history sources: Patient, Surescripts, and H&P  In the past week, patient estimated taking medication this percent of the time: Greater than 90%      Significant changes made to the medication list:  None      Additional medication history information:   None    Medication reconciliation completed by provider prior to medication history? No    Time spent in this activity: 30 minutes    The information provided in this note is only as accurate as the sources available at the time of update(s)      Prior to Admission medications    Medication Sig Last Dose Taking? Auth Provider Long Term End Date   acetaminophen (TYLENOL) 500 MG tablet Take 1,000 mg by mouth as needed for mild pain prn Yes Reported, Patient     allopurinol (ZYLOPRIM) 300 MG tablet Take 1 tablet (300 mg) by mouth daily  Patient taking differently: Take 1 tablet by mouth every morning 02/29/2024 at am Yes Jelly Boswell APRN CNP     fish oil-omega-3 fatty acids 500 MG capsule  More than a month Yes Reported, Patient No    lisinopril (ZESTRIL) 30 MG tablet Take 1 tablet (30 mg) by mouth daily  Patient taking differently: Take 30 mg by mouth every morning 02/27/2024 at am Yes Jelly Boswell APRN CNP Yes    multivitamin w/minerals (MULTI-VITAMIN) tablet Take 1 tablet by mouth daily over a month Yes Reported, Patient     omeprazole (PRILOSEC) 20 MG DR capsule Take 1 capsule (20 mg) by mouth daily  Patient taking differently: Take 20 mg by mouth every morning 02/29/2024 at am Yes Jelly Boswell APRN CNP     Probiotic Product (FORTIFY DAILY PROBIOTIC PO) Take 1 capsule by mouth daily More than a month Yes Reported, Patient     sildenafil (REVATIO) 20 MG tablet TAKE BY MOUTH AS MANY TABLETS (MAX OF 5 TABS) AS NEEDED AT A TIME PRIOR TO INTERCOURSE. prn Yes Jelly Boswell APRN CNP Yes    VENTOLIN  (90 Base)  MCG/ACT inhaler INHALE 2 PUFFS BY MOUTH EVERY 6 HOURS AS NEEDED FOR SHORTNESS OF BREATH /DYSPNEA  OR  WHEEZING prn Yes Jelly Boswell, APRN CNP Yes        Medication history completed by: Betzaida Meehan

## 2024-02-29 ENCOUNTER — HOSPITAL ENCOUNTER (INPATIENT)
Facility: CLINIC | Age: 64
LOS: 1 days | Discharge: HOME | DRG: 708 | End: 2024-03-01
Attending: UROLOGY | Admitting: UROLOGY
Payer: COMMERCIAL

## 2024-02-29 ENCOUNTER — ANESTHESIA (OUTPATIENT)
Dept: SURGERY | Facility: CLINIC | Age: 64
DRG: 708 | End: 2024-02-29
Payer: COMMERCIAL

## 2024-02-29 DIAGNOSIS — Z98.890 POST-OPERATIVE STATE: Primary | ICD-10-CM

## 2024-02-29 LAB
GLUCOSE SERPL-MCNC: 110 MG/DL (ref 70–99)
POTASSIUM SERPL-SCNC: 3.9 MMOL/L (ref 3.4–5.3)

## 2024-02-29 PROCEDURE — 88309 TISSUE EXAM BY PATHOLOGIST: CPT | Mod: 26 | Performed by: PATHOLOGY

## 2024-02-29 PROCEDURE — 999N000141 HC STATISTIC PRE-PROCEDURE NURSING ASSESSMENT: Performed by: UROLOGY

## 2024-02-29 PROCEDURE — 07TC4ZZ RESECTION OF PELVIS LYMPHATIC, PERCUTANEOUS ENDOSCOPIC APPROACH: ICD-10-PCS | Performed by: UROLOGY

## 2024-02-29 PROCEDURE — 258N000003 HC RX IP 258 OP 636: Performed by: ANESTHESIOLOGY

## 2024-02-29 PROCEDURE — 88307 TISSUE EXAM BY PATHOLOGIST: CPT | Mod: 26 | Performed by: PATHOLOGY

## 2024-02-29 PROCEDURE — 272N000001 HC OR GENERAL SUPPLY STERILE: Performed by: UROLOGY

## 2024-02-29 PROCEDURE — 250N000009 HC RX 250: Performed by: NURSE ANESTHETIST, CERTIFIED REGISTERED

## 2024-02-29 PROCEDURE — 250N000011 HC RX IP 250 OP 636: Performed by: STUDENT IN AN ORGANIZED HEALTH CARE EDUCATION/TRAINING PROGRAM

## 2024-02-29 PROCEDURE — 120N000001 HC R&B MED SURG/OB

## 2024-02-29 PROCEDURE — 250N000011 HC RX IP 250 OP 636: Performed by: NURSE ANESTHETIST, CERTIFIED REGISTERED

## 2024-02-29 PROCEDURE — 88344 IMHCHEM/IMCYTCHM EA MLT ANTB: CPT | Mod: 26 | Performed by: PATHOLOGY

## 2024-02-29 PROCEDURE — 8E0W4CZ ROBOTIC ASSISTED PROCEDURE OF TRUNK REGION, PERCUTANEOUS ENDOSCOPIC APPROACH: ICD-10-PCS | Performed by: UROLOGY

## 2024-02-29 PROCEDURE — 250N000011 HC RX IP 250 OP 636: Performed by: UROLOGY

## 2024-02-29 PROCEDURE — 82947 ASSAY GLUCOSE BLOOD QUANT: CPT | Performed by: ANESTHESIOLOGY

## 2024-02-29 PROCEDURE — 38571 LAPAROSCOPY LYMPHADENECTOMY: CPT | Mod: GC | Performed by: UROLOGY

## 2024-02-29 PROCEDURE — 250N000025 HC SEVOFLURANE, PER MIN: Performed by: UROLOGY

## 2024-02-29 PROCEDURE — 88309 TISSUE EXAM BY PATHOLOGIST: CPT | Mod: TC | Performed by: UROLOGY

## 2024-02-29 PROCEDURE — 0VT34ZZ RESECTION OF BILATERAL SEMINAL VESICLES, PERCUTANEOUS ENDOSCOPIC APPROACH: ICD-10-PCS | Performed by: UROLOGY

## 2024-02-29 PROCEDURE — 55866 LAPS SURG PRST8ECT RPBIC RAD: CPT | Performed by: NURSE ANESTHETIST, CERTIFIED REGISTERED

## 2024-02-29 PROCEDURE — 36415 COLL VENOUS BLD VENIPUNCTURE: CPT | Performed by: ANESTHESIOLOGY

## 2024-02-29 PROCEDURE — 55866 LAPS SURG PRST8ECT RPBIC RAD: CPT | Performed by: ANESTHESIOLOGY

## 2024-02-29 PROCEDURE — 0VBQ4ZZ EXCISION OF BILATERAL VAS DEFERENS, PERCUTANEOUS ENDOSCOPIC APPROACH: ICD-10-PCS | Performed by: UROLOGY

## 2024-02-29 PROCEDURE — 84132 ASSAY OF SERUM POTASSIUM: CPT | Performed by: ANESTHESIOLOGY

## 2024-02-29 PROCEDURE — 55866 LAPS SURG PRST8ECT RPBIC RAD: CPT | Mod: GC | Performed by: UROLOGY

## 2024-02-29 PROCEDURE — 710N000009 HC RECOVERY PHASE 1, LEVEL 1, PER MIN: Performed by: UROLOGY

## 2024-02-29 PROCEDURE — 250N000011 HC RX IP 250 OP 636: Performed by: ANESTHESIOLOGY

## 2024-02-29 PROCEDURE — 370N000017 HC ANESTHESIA TECHNICAL FEE, PER MIN: Performed by: UROLOGY

## 2024-02-29 PROCEDURE — 250N000013 HC RX MED GY IP 250 OP 250 PS 637: Performed by: STUDENT IN AN ORGANIZED HEALTH CARE EDUCATION/TRAINING PROGRAM

## 2024-02-29 PROCEDURE — 0VT04ZZ RESECTION OF PROSTATE, PERCUTANEOUS ENDOSCOPIC APPROACH: ICD-10-PCS | Performed by: UROLOGY

## 2024-02-29 PROCEDURE — 258N000003 HC RX IP 258 OP 636: Performed by: NURSE ANESTHETIST, CERTIFIED REGISTERED

## 2024-02-29 PROCEDURE — 360N000080 HC SURGERY LEVEL 7, PER MIN: Performed by: UROLOGY

## 2024-02-29 PROCEDURE — 88305 TISSUE EXAM BY PATHOLOGIST: CPT | Mod: 26 | Performed by: PATHOLOGY

## 2024-02-29 PROCEDURE — 258N000003 HC RX IP 258 OP 636: Performed by: STUDENT IN AN ORGANIZED HEALTH CARE EDUCATION/TRAINING PROGRAM

## 2024-02-29 RX ORDER — NALOXONE HYDROCHLORIDE 0.4 MG/ML
0.2 INJECTION, SOLUTION INTRAMUSCULAR; INTRAVENOUS; SUBCUTANEOUS
Status: DISCONTINUED | OUTPATIENT
Start: 2024-02-29 | End: 2024-03-01 | Stop reason: HOSPADM

## 2024-02-29 RX ORDER — FENTANYL CITRATE 0.05 MG/ML
25 INJECTION, SOLUTION INTRAMUSCULAR; INTRAVENOUS EVERY 5 MIN PRN
Status: DISCONTINUED | OUTPATIENT
Start: 2024-02-29 | End: 2024-02-29 | Stop reason: HOSPADM

## 2024-02-29 RX ORDER — LIDOCAINE 40 MG/G
CREAM TOPICAL
Status: DISCONTINUED | OUTPATIENT
Start: 2024-02-29 | End: 2024-03-01 | Stop reason: HOSPADM

## 2024-02-29 RX ORDER — SODIUM CHLORIDE, SODIUM LACTATE, POTASSIUM CHLORIDE, CALCIUM CHLORIDE 600; 310; 30; 20 MG/100ML; MG/100ML; MG/100ML; MG/100ML
INJECTION, SOLUTION INTRAVENOUS CONTINUOUS
Status: DISCONTINUED | OUTPATIENT
Start: 2024-02-29 | End: 2024-02-29 | Stop reason: HOSPADM

## 2024-02-29 RX ORDER — OXYCODONE HYDROCHLORIDE 5 MG/1
5 TABLET ORAL EVERY 4 HOURS PRN
Status: DISCONTINUED | OUTPATIENT
Start: 2024-02-29 | End: 2024-03-01

## 2024-02-29 RX ORDER — DEXAMETHASONE SODIUM PHOSPHATE 4 MG/ML
INJECTION, SOLUTION INTRA-ARTICULAR; INTRALESIONAL; INTRAMUSCULAR; INTRAVENOUS; SOFT TISSUE PRN
Status: DISCONTINUED | OUTPATIENT
Start: 2024-02-29 | End: 2024-02-29

## 2024-02-29 RX ORDER — ONDANSETRON 2 MG/ML
INJECTION INTRAMUSCULAR; INTRAVENOUS PRN
Status: DISCONTINUED | OUTPATIENT
Start: 2024-02-29 | End: 2024-02-29

## 2024-02-29 RX ORDER — AMOXICILLIN 250 MG
1 CAPSULE ORAL 2 TIMES DAILY
Status: DISCONTINUED | OUTPATIENT
Start: 2024-02-29 | End: 2024-03-01 | Stop reason: HOSPADM

## 2024-02-29 RX ORDER — SODIUM CHLORIDE 9 MG/ML
INJECTION, SOLUTION INTRAVENOUS CONTINUOUS
Status: DISCONTINUED | OUTPATIENT
Start: 2024-02-29 | End: 2024-03-01

## 2024-02-29 RX ORDER — NALOXONE HYDROCHLORIDE 0.4 MG/ML
0.1 INJECTION, SOLUTION INTRAMUSCULAR; INTRAVENOUS; SUBCUTANEOUS
Status: DISCONTINUED | OUTPATIENT
Start: 2024-02-29 | End: 2024-02-29 | Stop reason: HOSPADM

## 2024-02-29 RX ORDER — ACETAMINOPHEN 325 MG/1
975 TABLET ORAL 3 TIMES DAILY
Status: DISCONTINUED | OUTPATIENT
Start: 2024-02-29 | End: 2024-03-01 | Stop reason: HOSPADM

## 2024-02-29 RX ORDER — HYDROMORPHONE HCL IN WATER/PF 6 MG/30 ML
0.2 PATIENT CONTROLLED ANALGESIA SYRINGE INTRAVENOUS
Status: DISCONTINUED | OUTPATIENT
Start: 2024-02-29 | End: 2024-03-01 | Stop reason: HOSPADM

## 2024-02-29 RX ORDER — LABETALOL HYDROCHLORIDE 5 MG/ML
5 INJECTION, SOLUTION INTRAVENOUS
Status: DISCONTINUED | OUTPATIENT
Start: 2024-02-29 | End: 2024-02-29 | Stop reason: HOSPADM

## 2024-02-29 RX ORDER — CEFAZOLIN SODIUM/WATER 2 G/20 ML
2 SYRINGE (ML) INTRAVENOUS SEE ADMIN INSTRUCTIONS
Status: DISCONTINUED | OUTPATIENT
Start: 2024-02-29 | End: 2024-02-29 | Stop reason: HOSPADM

## 2024-02-29 RX ORDER — ONDANSETRON 4 MG/1
4 TABLET, ORALLY DISINTEGRATING ORAL EVERY 30 MIN PRN
Status: DISCONTINUED | OUTPATIENT
Start: 2024-02-29 | End: 2024-02-29 | Stop reason: HOSPADM

## 2024-02-29 RX ORDER — HYDROMORPHONE HCL IN WATER/PF 6 MG/30 ML
0.2 PATIENT CONTROLLED ANALGESIA SYRINGE INTRAVENOUS EVERY 5 MIN PRN
Status: DISCONTINUED | OUTPATIENT
Start: 2024-02-29 | End: 2024-02-29 | Stop reason: HOSPADM

## 2024-02-29 RX ORDER — NALOXONE HYDROCHLORIDE 0.4 MG/ML
0.4 INJECTION, SOLUTION INTRAMUSCULAR; INTRAVENOUS; SUBCUTANEOUS
Status: DISCONTINUED | OUTPATIENT
Start: 2024-02-29 | End: 2024-03-01 | Stop reason: HOSPADM

## 2024-02-29 RX ORDER — CEFAZOLIN SODIUM/WATER 2 G/20 ML
2 SYRINGE (ML) INTRAVENOUS
Status: COMPLETED | OUTPATIENT
Start: 2024-02-29 | End: 2024-02-29

## 2024-02-29 RX ORDER — VECURONIUM BROMIDE 1 MG/ML
INJECTION, POWDER, LYOPHILIZED, FOR SOLUTION INTRAVENOUS PRN
Status: DISCONTINUED | OUTPATIENT
Start: 2024-02-29 | End: 2024-02-29

## 2024-02-29 RX ORDER — DEXMEDETOMIDINE HYDROCHLORIDE 4 UG/ML
INJECTION, SOLUTION INTRAVENOUS PRN
Status: DISCONTINUED | OUTPATIENT
Start: 2024-02-29 | End: 2024-02-29

## 2024-02-29 RX ORDER — PROPOFOL 10 MG/ML
INJECTION, EMULSION INTRAVENOUS PRN
Status: DISCONTINUED | OUTPATIENT
Start: 2024-02-29 | End: 2024-02-29

## 2024-02-29 RX ORDER — HEPARIN SODIUM 5000 [USP'U]/.5ML
5000 INJECTION, SOLUTION INTRAVENOUS; SUBCUTANEOUS
Status: COMPLETED | OUTPATIENT
Start: 2024-02-29 | End: 2024-02-29

## 2024-02-29 RX ORDER — HYDROMORPHONE HCL IN WATER/PF 6 MG/30 ML
0.4 PATIENT CONTROLLED ANALGESIA SYRINGE INTRAVENOUS EVERY 5 MIN PRN
Status: DISCONTINUED | OUTPATIENT
Start: 2024-02-29 | End: 2024-02-29 | Stop reason: HOSPADM

## 2024-02-29 RX ORDER — HYDRALAZINE HYDROCHLORIDE 20 MG/ML
2.5-5 INJECTION INTRAMUSCULAR; INTRAVENOUS
Status: DISCONTINUED | OUTPATIENT
Start: 2024-02-29 | End: 2024-02-29 | Stop reason: HOSPADM

## 2024-02-29 RX ORDER — PANTOPRAZOLE SODIUM 40 MG/1
40 TABLET, DELAYED RELEASE ORAL
Status: DISCONTINUED | OUTPATIENT
Start: 2024-03-01 | End: 2024-03-01 | Stop reason: HOSPADM

## 2024-02-29 RX ORDER — ONDANSETRON 4 MG/1
4 TABLET, ORALLY DISINTEGRATING ORAL EVERY 6 HOURS PRN
Status: DISCONTINUED | OUTPATIENT
Start: 2024-02-29 | End: 2024-03-01 | Stop reason: HOSPADM

## 2024-02-29 RX ORDER — OXYCODONE HYDROCHLORIDE 5 MG/1
10 TABLET ORAL EVERY 4 HOURS PRN
Status: DISCONTINUED | OUTPATIENT
Start: 2024-02-29 | End: 2024-03-01

## 2024-02-29 RX ORDER — BUPIVACAINE HYDROCHLORIDE 2.5 MG/ML
INJECTION, SOLUTION INFILTRATION; PERINEURAL PRN
Status: DISCONTINUED | OUTPATIENT
Start: 2024-02-29 | End: 2024-02-29 | Stop reason: HOSPADM

## 2024-02-29 RX ORDER — PROPOFOL 10 MG/ML
INJECTION, EMULSION INTRAVENOUS CONTINUOUS PRN
Status: DISCONTINUED | OUTPATIENT
Start: 2024-02-29 | End: 2024-02-29

## 2024-02-29 RX ORDER — FENTANYL CITRATE 50 UG/ML
INJECTION, SOLUTION INTRAMUSCULAR; INTRAVENOUS PRN
Status: DISCONTINUED | OUTPATIENT
Start: 2024-02-29 | End: 2024-02-29

## 2024-02-29 RX ORDER — ONDANSETRON 2 MG/ML
4 INJECTION INTRAMUSCULAR; INTRAVENOUS EVERY 6 HOURS PRN
Status: DISCONTINUED | OUTPATIENT
Start: 2024-02-29 | End: 2024-03-01

## 2024-02-29 RX ORDER — MEPERIDINE HYDROCHLORIDE 25 MG/ML
12.5 INJECTION INTRAMUSCULAR; INTRAVENOUS; SUBCUTANEOUS EVERY 5 MIN PRN
Status: DISCONTINUED | OUTPATIENT
Start: 2024-02-29 | End: 2024-02-29 | Stop reason: HOSPADM

## 2024-02-29 RX ORDER — ONDANSETRON 2 MG/ML
4 INJECTION INTRAMUSCULAR; INTRAVENOUS EVERY 30 MIN PRN
Status: DISCONTINUED | OUTPATIENT
Start: 2024-02-29 | End: 2024-02-29 | Stop reason: HOSPADM

## 2024-02-29 RX ORDER — ALLOPURINOL 300 MG/1
300 TABLET ORAL DAILY
Status: DISCONTINUED | OUTPATIENT
Start: 2024-03-01 | End: 2024-03-01 | Stop reason: HOSPADM

## 2024-02-29 RX ORDER — ALBUTEROL SULFATE 90 UG/1
2 AEROSOL, METERED RESPIRATORY (INHALATION) EVERY 6 HOURS PRN
Status: DISCONTINUED | OUTPATIENT
Start: 2024-02-29 | End: 2024-03-01 | Stop reason: HOSPADM

## 2024-02-29 RX ORDER — FENTANYL CITRATE 0.05 MG/ML
50 INJECTION, SOLUTION INTRAMUSCULAR; INTRAVENOUS EVERY 5 MIN PRN
Status: DISCONTINUED | OUTPATIENT
Start: 2024-02-29 | End: 2024-02-29 | Stop reason: HOSPADM

## 2024-02-29 RX ADMIN — ACETAMINOPHEN 975 MG: 325 TABLET, FILM COATED ORAL at 21:30

## 2024-02-29 RX ADMIN — HYDROMORPHONE HYDROCHLORIDE 0.2 MG: 0.2 INJECTION, SOLUTION INTRAMUSCULAR; INTRAVENOUS; SUBCUTANEOUS at 15:29

## 2024-02-29 RX ADMIN — HYDROMORPHONE HYDROCHLORIDE 0.2 MG: 0.2 INJECTION, SOLUTION INTRAMUSCULAR; INTRAVENOUS; SUBCUTANEOUS at 15:23

## 2024-02-29 RX ADMIN — SODIUM CHLORIDE, POTASSIUM CHLORIDE, SODIUM LACTATE AND CALCIUM CHLORIDE: 600; 310; 30; 20 INJECTION, SOLUTION INTRAVENOUS at 12:44

## 2024-02-29 RX ADMIN — MIDAZOLAM 2 MG: 1 INJECTION INTRAMUSCULAR; INTRAVENOUS at 10:47

## 2024-02-29 RX ADMIN — VECURONIUM BROMIDE 3 MG: 1 INJECTION, POWDER, LYOPHILIZED, FOR SOLUTION INTRAVENOUS at 12:33

## 2024-02-29 RX ADMIN — SODIUM CHLORIDE, POTASSIUM CHLORIDE, SODIUM LACTATE AND CALCIUM CHLORIDE: 600; 310; 30; 20 INJECTION, SOLUTION INTRAVENOUS at 09:44

## 2024-02-29 RX ADMIN — HYDROMORPHONE HYDROCHLORIDE 0.2 MG: 0.2 INJECTION, SOLUTION INTRAMUSCULAR; INTRAVENOUS; SUBCUTANEOUS at 22:33

## 2024-02-29 RX ADMIN — DEXMEDETOMIDINE HYDROCHLORIDE 12 MCG: 200 INJECTION INTRAVENOUS at 11:29

## 2024-02-29 RX ADMIN — ACETAMINOPHEN 975 MG: 325 TABLET, FILM COATED ORAL at 16:55

## 2024-02-29 RX ADMIN — PHENYLEPHRINE HYDROCHLORIDE 100 MCG: 10 INJECTION INTRAVENOUS at 10:55

## 2024-02-29 RX ADMIN — FENTANYL CITRATE 50 MCG: 50 INJECTION, SOLUTION INTRAMUSCULAR; INTRAVENOUS at 15:10

## 2024-02-29 RX ADMIN — DEXMEDETOMIDINE HYDROCHLORIDE 8 MCG: 200 INJECTION INTRAVENOUS at 13:56

## 2024-02-29 RX ADMIN — Medication 2 G: at 10:55

## 2024-02-29 RX ADMIN — FENTANYL CITRATE 100 MCG: 50 INJECTION INTRAMUSCULAR; INTRAVENOUS at 10:55

## 2024-02-29 RX ADMIN — PHENYLEPHRINE HYDROCHLORIDE 0.5 MCG/KG/MIN: 10 INJECTION INTRAVENOUS at 11:00

## 2024-02-29 RX ADMIN — VECURONIUM BROMIDE 2 MG: 1 INJECTION, POWDER, LYOPHILIZED, FOR SOLUTION INTRAVENOUS at 13:33

## 2024-02-29 RX ADMIN — HEPARIN SODIUM 5000 UNITS: 10000 INJECTION, SOLUTION INTRAVENOUS; SUBCUTANEOUS at 09:47

## 2024-02-29 RX ADMIN — DEXAMETHASONE SODIUM PHOSPHATE 4 MG: 4 INJECTION, SOLUTION INTRA-ARTICULAR; INTRALESIONAL; INTRAMUSCULAR; INTRAVENOUS; SOFT TISSUE at 11:13

## 2024-02-29 RX ADMIN — HYDROMORPHONE HYDROCHLORIDE 0.2 MG: 0.2 INJECTION, SOLUTION INTRAMUSCULAR; INTRAVENOUS; SUBCUTANEOUS at 16:52

## 2024-02-29 RX ADMIN — ROCURONIUM BROMIDE 50 MG: 50 INJECTION, SOLUTION INTRAVENOUS at 10:56

## 2024-02-29 RX ADMIN — ONDANSETRON 4 MG: 2 INJECTION INTRAMUSCULAR; INTRAVENOUS at 13:56

## 2024-02-29 RX ADMIN — DOCUSATE SODIUM 50 MG AND SENNOSIDES 8.6 MG 1 TABLET: 8.6; 5 TABLET, FILM COATED ORAL at 21:30

## 2024-02-29 RX ADMIN — HYDROMORPHONE HYDROCHLORIDE 0.2 MG: 0.2 INJECTION, SOLUTION INTRAMUSCULAR; INTRAVENOUS; SUBCUTANEOUS at 18:49

## 2024-02-29 RX ADMIN — SUGAMMADEX 200 MG: 100 INJECTION, SOLUTION INTRAVENOUS at 14:11

## 2024-02-29 RX ADMIN — HYDROMORPHONE HYDROCHLORIDE 0.5 MG: 1 INJECTION, SOLUTION INTRAMUSCULAR; INTRAVENOUS; SUBCUTANEOUS at 11:17

## 2024-02-29 RX ADMIN — PROPOFOL 250 MG: 10 INJECTION, EMULSION INTRAVENOUS at 10:55

## 2024-02-29 RX ADMIN — SODIUM CHLORIDE: 9 INJECTION, SOLUTION INTRAVENOUS at 16:51

## 2024-02-29 RX ADMIN — ONDANSETRON 4 MG: 2 INJECTION INTRAMUSCULAR; INTRAVENOUS at 19:28

## 2024-02-29 RX ADMIN — PROPOFOL 30 MCG/KG/MIN: 10 INJECTION, EMULSION INTRAVENOUS at 10:55

## 2024-02-29 RX ADMIN — ROCURONIUM BROMIDE 20 MG: 50 INJECTION, SOLUTION INTRAVENOUS at 12:02

## 2024-02-29 RX ADMIN — ROCURONIUM BROMIDE 30 MG: 50 INJECTION, SOLUTION INTRAVENOUS at 11:17

## 2024-02-29 ASSESSMENT — ACTIVITIES OF DAILY LIVING (ADL)
ADLS_ACUITY_SCORE: 26
ADLS_ACUITY_SCORE: 20
ADLS_ACUITY_SCORE: 24
ADLS_ACUITY_SCORE: 20
ADLS_ACUITY_SCORE: 26
ADLS_ACUITY_SCORE: 24
ADLS_ACUITY_SCORE: 20

## 2024-02-29 ASSESSMENT — ENCOUNTER SYMPTOMS: SEIZURES: 0

## 2024-02-29 ASSESSMENT — LIFESTYLE VARIABLES: TOBACCO_USE: 0

## 2024-02-29 NOTE — OP NOTE
OPERATIVE REPORT  DATE OF PROCEDURE: 2/29/24  PRE-PROCEDURE DIAGNOSIS: Prostate cancer.   POST-PROCEDURE DIAGNOSIS: Prostate cancer.   PROCEDURES PERFORMED:   1) Robotic-assisted laparoscopic radical prostatectomy  2) Bilateral pelvic lymphadenectomy  SURGEON: Elizabeth Diop MD - Present for the entire procedure  ASSISTANT: Tennille Naranjo MD  ASSISTANT: Ce MONTANO  ANESTHESIA: General with endotracheal intubation.   INDICATIONS FOR PROCEDURE: 63M history of [place] 3+4 = 7 adenocarcinoma of the prostate. He has been counseled regarding treatment options and presents to undergo surgery.   Findings: Prostate and seminal vesicles removed without complication. Water-tight anastomosis. Right sided nerve sparing, minimal left side given concern for EPE. No evidence of extracapsular extension.   Specimens:   Prostate and seminal vesicles  Left pelvic lymph nodes  Right pelvic lymph nodes  Estimated blood loss: 200 ml  DESCRIPTION OF PROCEDURE: After the patient was brought to the operating room and induction of general anesthesia, he was prepped and draped in the supine position.  All pressure points were padded.  He was prepped and draped in the usual sterile fashion for transperitoneal pelvic laparoscopy.  Initial access was obtained using a 1.5 cm supraumbilical incision through which we deployed a Veress needle to obtain pneumoperitoneum to a pressure of 20 mmHg.  Subsequently we placed laparoscopic and robotic ports so that there was a total of three robotic 8 mm ports, one 12 mm Air seal port. Michael-Dakotah device was used to preplace 0-Vicryl fascial stitch at the airseal port. At this point, we docked the robot and initially mobilized some adhesions of the sigmoid colon out of the pelvic cavity.  We then dropped the bladder from the anterior abdominal wall to enter the retropubic space of Retzius.  The pubic arch was defined all the way around to the anterior surface of the prostate and the anterior surface was  defatted to expose the prostatovesical junction. This was sent off to pathology. Subsequently, the endopelvic fascia was incised laterally from the base to the apex.   Subsequently we divided the anterior bladder neck to enter the lumen of the bladder neck.  I then incised the posterior bladder neck and entered the retrovesical space.  The dissection was deepened to identify the vasa deferentia and the seminal vesicles on either side.  The vas was freed up and divided and the seminal vesicles were also freed up from the surrounding tissues.  I then incised the posterior layer of Denonvilliers fascia to enter the prerectal space.  Both the lateral pedicles were now sequentially clipped and divided nicely preserving the neurovascular complex bilaterally.  After the posterior dissection was completed, I divided the anterior dorsal venous complex and then the membranous urethra was divided and the rectourethralis was also divided as well.  The prostate was then completely freed and placed in the EndoCatch bag. We used a 3-0 V-loc stitch to over sew the deep vein complex. Resection bed was then irrigated and hemostasis was obtained.    At this point, we performed bilateral pelvic lymph node dissection with standard template.  All the lymphoid tissue in the space was sent to Pathology separately as left and right pelvic lymph nodes.  At this point, we performed a Blayne stitch to approximate the rectourethralis to the posterior detrusor and performed a running urethrovesical anastomosis using two combined V-Lock sutures.  The vesicourethra anastomosis was performed with two running 3-0 V-loc sutures starting posteriorly and coming up anteriorly on either side. After completing the anastomosis, an indwelling 18-Arabic De Souza catheter was placed and the anastomosis was found to be completely watertight after 120 cc was instilled into the bladder.   At this point, we placed a Bishop drain lateral to the bladder and laparoscopic  exit was completed in the usual fashion without any incidents or complications. Prostate and Svs were then removed through the camera port and the incision was then closed with a 0-Vicryl suture. The preplaced fascial stitch at the Airseal port was tied down. The 8 mm port skin incisions were closed with 4-0 Monocryl sutures.  Steri-Strips and dressings were applied to the wound.  The patient was awakened, extubated, and taken to the recovery room in stable condition.  The estimated blood loss was 200 cc.   The patient tolerated the procedure extremely well.  Sponge, needle, and instrument counts were correct x2. He did not receive any blood transfusions, was awakened and taken to the recovery room in stable condition.   Tennille Naranjo MD  PGY-5 Urology Resident

## 2024-02-29 NOTE — ANESTHESIA CARE TRANSFER NOTE
Patient: Shon Sargent    Procedure: Procedure(s):  ROBOT-ASSISTED LAPAROSCOPIC RADICAL PROSTATECTOMY WITH BILATERAL PELVIC LYMPHADENECTOMY       Diagnosis: Prostate cancer (H) [C61]  Diagnosis Additional Information: No value filed.    Anesthesia Type:   General     Note:    Oropharynx: oral airway in place and spontaneously breathing  Level of Consciousness: awake  Oxygen Supplementation: face mask  Level of Supplemental Oxygen (L/min / FiO2): 6  Independent Airway: airway patency satisfactory and stable  Dentition: dentition unchanged  Vital Signs Stable: post-procedure vital signs reviewed and stable  Report to RN Given: handoff report given  Patient transferred to: PACU    Handoff Report: Identifed the Patient, Identified the Reponsible Provider, Reviewed the pertinent medical history, Discussed the surgical course, Reviewed Intra-OP anesthesia mangement and issues during anesthesia, Set expectations for post-procedure period and Allowed opportunity for questions and acknowledgement of understanding    Vitals:  Vitals Value Taken Time   /91 02/29/24 1423   Temp     Pulse 69 02/29/24 1427   Resp 22 02/29/24 1427   SpO2 91 % 02/29/24 1426   Vitals shown include unfiled device data.    Electronically Signed By: TEO Daniels CRNA  February 29, 2024  2:28 PM

## 2024-02-29 NOTE — ANESTHESIA PROCEDURE NOTES
Airway       Patient location during procedure: OR ([facility] - Operating Room or Procedural Area)       Procedure Start/Stop Times: 2/29/2024 10:58 AM  Staff -        Anesthesiologist:  Miguel A Shi MD       Performed By: CRNAIndications and Patient Condition       Indications for airway management: kriss-procedural       Induction type:intravenous       Mask difficulty assessment: 2 - vent by mask + OA or adjuvant +/- NMBA    Final Airway Details       Final airway type: endotracheal airway       Successful airway: ETT - single  Endotracheal Airway Details        ETT size (mm): 8.0       Cuffed: yes       Cuff volume (mL): 8       Successful intubation technique: video laryngoscopy       VL Blade Size: Abdalla 4       Grade View of Cords: 1       Adjucts: stylet       Position: Right       Measured from: lips       Secured at (cm): 23       Bite block used: None    Post intubation assessment        Number of attempts at approach: 1       Number of other approaches attempted: 0       Secured with: tape       Ease of procedure: easy       Dentition: Intact and Unchanged    Medication(s) Administered   Medication Administration Time: 2/29/2024 10:58 AM

## 2024-02-29 NOTE — ANESTHESIA POSTPROCEDURE EVALUATION
Patient: Shon Sargent    Procedure: Procedure(s):  ROBOT-ASSISTED LAPAROSCOPIC RADICAL PROSTATECTOMY WITH BILATERAL PELVIC LYMPHADENECTOMY       Anesthesia Type:  General    Note:     Postop Pain Control: Uneventful            Sign Out: Well controlled pain   PONV:    Neuro/Psych: Uneventful            Sign Out: Acceptable/Baseline neuro status   Airway/Respiratory: Uneventful            Sign Out: Acceptable/Baseline resp. status   CV/Hemodynamics: Uneventful            Sign Out: Acceptable CV status; No obvious hypovolemia; No obvious fluid overload   Other NRE:    DID A NON-ROUTINE EVENT OCCUR?            Last vitals:  Vitals Value Taken Time   /77 02/29/24 1515   Temp     Pulse 67 02/29/24 1529   Resp 34 02/29/24 1529   SpO2 96 % 02/29/24 1529   Vitals shown include unfiled device data.    Electronically Signed By: Miguel A Shi MD  February 29, 2024  3:30 PM

## 2024-02-29 NOTE — ANESTHESIA PREPROCEDURE EVALUATION
"Anesthesia Pre-Procedure Evaluation    Patient: Shon Sargent   MRN: [id number] : 1960        Procedure : Procedure(s):  PROSTATECTOMY, ROBOT-ASSISTED, WITH PELVIC LYMPHADENECTOMY          Past Medical History:   Diagnosis Date    ED (erectile dysfunction)     GERD (gastroesophageal reflux disease)     Gout     Hypertension     Prostate cancer (H)       Past Surgical History:   Procedure Laterality Date    COLONOSCOPY N/A 10/08/2015    Procedure: COLONOSCOPY;  Surgeon: Rolando Ayers MD;  Location: [facility]    ESOPHAGOSCOPY, GASTROSCOPY, DUODENOSCOPY (EGD), COMBINED N/A 2014    Procedure: COMBINED ESOPHAGOSCOPY, GASTROSCOPY, DUODENOSCOPY (EGD);  Surgeon: Rolando Ayers MD;  Location: [facility]    ESOPHAGOSCOPY, GASTROSCOPY, DUODENOSCOPY (EGD), COMBINED N/A 10/08/2015    Procedure: COMBINED ESOPHAGOSCOPY, GASTROSCOPY, DUODENOSCOPY (EGD), BIOPSY SINGLE OR MULTIPLE;  Surgeon: Rolando Ayers MD;  Location: [facility]    HERNIA REPAIR  2007    umbilical    TOTAL HIP ARTHROPLASTY Right       Allergies   Allergen Reactions    Valdecoxib Hives     Bextra      Social History     Tobacco Use    Smoking status: Never    Smokeless tobacco: Never   Substance Use Topics    Alcohol use: Not Currently     Comment: occas      Wt Readings from Last 1 Encounters:   24 115.8 kg (255 lb 3.2 oz)        Anesthesia Evaluation   Pt has had prior anesthetic. Type: General and MAC.    History of anesthetic complications  -  and PONV.  Patient states that he was \"difficult to put to sleep\" with hernia surgery >15 years ago. He also reports that he \"woke up\" during his hip surgery then was \"really knocked out and did not wake for 6 hours\"  in .    ROS/MED HX  ENT/Pulmonary:     (+)     MERE risk factors, snores loudly, hypertension, obese,                             (-) tobacco use and recent URI   Neurologic: Comment: Essential Tremor, bilateral hands    (+)    peripheral neuropathy, - bilateral feet.                    "     (-) no seizures, no CVA and no TIA   Cardiovascular:     (+)  hypertension- -   -  - -                                 Previous cardiac testing   Echo: Date: Results:    Stress Test:  Date: Results:    ECG Reviewed:  Date: 11/2021 Results:  Sinus rhythm  PACs  Cath:  Date: Results:      METS/Exercise Tolerance: >4 METS Comment: Active job as a , on his feet all day, heavy lifting. Denies exertional symptoms.    Hematologic:     (+)      anemia,       (-) history of blood clots and history of blood transfusion   Musculoskeletal: Comment: Gout  S/p right total hip arthroplasty 2021      GI/Hepatic:     (+) GERD, Asymptomatic on medication,               Liver disease: fatty liver.   Renal/Genitourinary: Comment: ED   (-) renal disease   Endo:     (+)               Obesity,    (-) Type II DM and thyroid disease   Psychiatric/Substance Use:  - neg psychiatric ROS     Infectious Disease:  - neg infectious disease ROS     Malignancy:   (+) Malignancy, History of Prostate.Prostate CA Active status post.      Other:  - neg other ROS          Physical Exam    Airway        Mallampati: III   TM distance: > 3 FB   Neck ROM: full   Mouth opening: > 3 cm    Respiratory Devices and Support         Dental  no notable dental history     (+) Modest Abnormalities - crowns, retainers, 1 or 2 missing teeth      Cardiovascular          Rhythm and rate: regular     Pulmonary           (+) decreased breath sounds           OUTSIDE LABS:  CBC:   Lab Results   Component Value Date    WBC 7.2 02/13/2024    WBC 5.9 06/29/2023    HGB 14.9 02/13/2024    HGB 14.5 06/29/2023    HCT 45.7 02/13/2024    HCT 45.5 06/29/2023     02/13/2024     06/29/2023     BMP:   Lab Results   Component Value Date     02/13/2024     06/29/2023    POTASSIUM 3.9 02/29/2024    POTASSIUM 4.5 02/13/2024    CHLORIDE 106 02/13/2024    CHLORIDE 107 06/29/2023    CO2 25 02/13/2024    CO2 22 06/29/2023    BUN 12.7 02/13/2024  "   BUN 18.8 06/29/2023    CR 0.99 02/13/2024    CR 0.97 06/29/2023     (H) 02/29/2024     (H) 02/13/2024     COAGS:   Lab Results   Component Value Date    INR 1.03 09/02/2019     POC: No results found for: \"BGM\", \"HCG\", \"HCGS\"  HEPATIC:   Lab Results   Component Value Date    ALBUMIN 4.4 02/13/2024    PROTTOTAL 7.3 02/13/2024    ALT 20 02/13/2024    AST 22 02/13/2024    ALKPHOS 98 02/13/2024    BILITOTAL 0.6 02/13/2024     OTHER:   Lab Results   Component Value Date    LACT 1.1 09/02/2019    A1C 5.8 (H) 06/29/2023    ALIX 9.6 02/13/2024    LIPASE 109 06/11/2019       Anesthesia Plan    ASA Status:  2       Anesthesia Type: General.     - Airway: ETT   Induction: Intravenous, Propofol.   Maintenance: Balanced.   Techniques and Equipment:     - Lines/Monitors: 2nd IV     Consents    Anesthesia Plan(s) and associated risks, benefits, and realistic alternatives discussed. Questions answered and patient/representative(s) expressed understanding.     - Discussed:     - Discussed with:  Patient            Postoperative Care    Pain management: Multi-modal analgesia.   PONV prophylaxis: Ondansetron (or other 5HT-3), Dexamethasone or Solumedrol     Comments:               Miguel A Shi MD    I have reviewed the pertinent notes and labs in the chart from the past 30 days and (re)examined the patient.  Any updates or changes from those notes are reflected in this note.              # Obesity: Estimated body mass index is 35.59 kg/m  as calculated from the following:    Height as of this encounter: 1.803 m (5' 11\").    Weight as of this encounter: 115.8 kg (255 lb 3.2 oz).      "

## 2024-02-29 NOTE — OR NURSING
BENIGNO Shi gave OK for pt to leave PACU  and go to his Gen/Surg room 2204. Pt is tolerating ice chips. Anesthesia says pt probably has undiagnosed sleep apnea. Pt has been known to snore. Pt's O2 sats go from 89-98% on 4L/NC (drops when he dozes).

## 2024-03-01 VITALS
HEART RATE: 72 BPM | BODY MASS INDEX: 35.73 KG/M2 | WEIGHT: 255.2 LBS | HEIGHT: 71 IN | DIASTOLIC BLOOD PRESSURE: 95 MMHG | OXYGEN SATURATION: 93 % | RESPIRATION RATE: 16 BRPM | TEMPERATURE: 98.6 F | SYSTOLIC BLOOD PRESSURE: 158 MMHG

## 2024-03-01 LAB
ANION GAP SERPL CALCULATED.3IONS-SCNC: 12 MMOL/L (ref 7–15)
BUN SERPL-MCNC: 13.9 MG/DL (ref 8–23)
CALCIUM SERPL-MCNC: 8.7 MG/DL (ref 8.8–10.2)
CHLORIDE SERPL-SCNC: 102 MMOL/L (ref 98–107)
CREAT SERPL-MCNC: 0.93 MG/DL (ref 0.67–1.17)
DEPRECATED HCO3 PLAS-SCNC: 23 MMOL/L (ref 22–29)
EGFRCR SERPLBLD CKD-EPI 2021: >90 ML/MIN/1.73M2
ERYTHROCYTE [DISTWIDTH] IN BLOOD BY AUTOMATED COUNT: 13.6 % (ref 10–15)
GLUCOSE SERPL-MCNC: 125 MG/DL (ref 70–99)
HCT VFR BLD AUTO: 40.3 % (ref 40–53)
HGB BLD-MCNC: 13.3 G/DL (ref 13.3–17.7)
MCH RBC QN AUTO: 28.1 PG (ref 26.5–33)
MCHC RBC AUTO-ENTMCNC: 33 G/DL (ref 31.5–36.5)
MCV RBC AUTO: 85 FL (ref 78–100)
PLATELET # BLD AUTO: 204 10E3/UL (ref 150–450)
POTASSIUM SERPL-SCNC: 4.5 MMOL/L (ref 3.4–5.3)
RBC # BLD AUTO: 4.73 10E6/UL (ref 4.4–5.9)
SODIUM SERPL-SCNC: 137 MMOL/L (ref 135–145)
WBC # BLD AUTO: 11.1 10E3/UL (ref 4–11)

## 2024-03-01 PROCEDURE — 85027 COMPLETE CBC AUTOMATED: CPT | Performed by: STUDENT IN AN ORGANIZED HEALTH CARE EDUCATION/TRAINING PROGRAM

## 2024-03-01 PROCEDURE — 250N000013 HC RX MED GY IP 250 OP 250 PS 637: Performed by: PHYSICIAN ASSISTANT

## 2024-03-01 PROCEDURE — 250N000013 HC RX MED GY IP 250 OP 250 PS 637

## 2024-03-01 PROCEDURE — 250N000013 HC RX MED GY IP 250 OP 250 PS 637: Performed by: STUDENT IN AN ORGANIZED HEALTH CARE EDUCATION/TRAINING PROGRAM

## 2024-03-01 PROCEDURE — 250N000011 HC RX IP 250 OP 636: Performed by: STUDENT IN AN ORGANIZED HEALTH CARE EDUCATION/TRAINING PROGRAM

## 2024-03-01 PROCEDURE — 258N000003 HC RX IP 258 OP 636: Performed by: STUDENT IN AN ORGANIZED HEALTH CARE EDUCATION/TRAINING PROGRAM

## 2024-03-01 PROCEDURE — 80048 BASIC METABOLIC PNL TOTAL CA: CPT | Performed by: STUDENT IN AN ORGANIZED HEALTH CARE EDUCATION/TRAINING PROGRAM

## 2024-03-01 PROCEDURE — 36415 COLL VENOUS BLD VENIPUNCTURE: CPT | Performed by: STUDENT IN AN ORGANIZED HEALTH CARE EDUCATION/TRAINING PROGRAM

## 2024-03-01 RX ORDER — TRAMADOL HYDROCHLORIDE 50 MG/1
50 TABLET ORAL EVERY 6 HOURS PRN
Status: DISCONTINUED | OUTPATIENT
Start: 2024-03-01 | End: 2024-03-01 | Stop reason: HOSPADM

## 2024-03-01 RX ORDER — TRAMADOL HYDROCHLORIDE 50 MG/1
50 TABLET ORAL EVERY 6 HOURS PRN
Qty: 10 TABLET | Refills: 0 | Status: SHIPPED | OUTPATIENT
Start: 2024-03-01 | End: 2024-06-07

## 2024-03-01 RX ADMIN — DOCUSATE SODIUM 50 MG AND SENNOSIDES 8.6 MG 1 TABLET: 8.6; 5 TABLET, FILM COATED ORAL at 09:28

## 2024-03-01 RX ADMIN — PANTOPRAZOLE SODIUM 40 MG: 40 TABLET, DELAYED RELEASE ORAL at 06:34

## 2024-03-01 RX ADMIN — SODIUM CHLORIDE: 9 INJECTION, SOLUTION INTRAVENOUS at 03:35

## 2024-03-01 RX ADMIN — ALLOPURINOL 300 MG: 300 TABLET ORAL at 09:28

## 2024-03-01 RX ADMIN — LISINOPRIL 30 MG: 20 TABLET ORAL at 09:28

## 2024-03-01 RX ADMIN — TRAMADOL HYDROCHLORIDE 50 MG: 50 TABLET, COATED ORAL at 09:35

## 2024-03-01 RX ADMIN — ACETAMINOPHEN 975 MG: 325 TABLET, FILM COATED ORAL at 09:28

## 2024-03-01 RX ADMIN — TRAMADOL HYDROCHLORIDE 50 MG: 50 TABLET, COATED ORAL at 14:28

## 2024-03-01 RX ADMIN — HYDROMORPHONE HYDROCHLORIDE 0.2 MG: 0.2 INJECTION, SOLUTION INTRAMUSCULAR; INTRAVENOUS; SUBCUTANEOUS at 03:35

## 2024-03-01 ASSESSMENT — ACTIVITIES OF DAILY LIVING (ADL)
ADLS_ACUITY_SCORE: 25
ADLS_ACUITY_SCORE: 26
ADLS_ACUITY_SCORE: 25
ADLS_ACUITY_SCORE: 24
ADLS_ACUITY_SCORE: 25

## 2024-03-01 NOTE — PLAN OF CARE
Goal Outcome Evaluation:      Plan of Care Reviewed With: patient    Overall Patient Progress: improvingOverall Patient Progress: improving       Surgery: POD #1 Robotic-assisted laparoscopic radical prostatectomy with bilateral pelvic lymphadenectomy.   Behavior & Aggression: Green  Fall Risk: Yes  Orientation: A&Ox4  ABNL VS/O2: VSS on 1-2 L NC  Pain Management: Scheduled Tylenol, PRN IV Dilaudid x2, & ice/heat packs  Bowel/Bladder: De Souza in place- adequate output. BS hypoactive. Reports abdominal tenderness.  Skin: Abdominal port sites intact with liquid bandage. RUQ CALLIE drain site.   Drains: CALLIE drain to RUQ with bright/bloody output- dressing with dried drainage.   IV: PIV infusing NS at 75 mL/hr  Diet: Regular- tolerated clears overnight. Reported intermittent nausea- relieved with PRN Zofran x1.  Activity Level: Ax1 gaitbelt & IV pole- ambulated in room  Anticipated  DC Date: Pending

## 2024-03-01 NOTE — PROGRESS NOTES
"Urology Daily Progress Note    24 hour events/Subjective:     - No acute events overnight   - Feels well    O:  Vitals: BP (!) 158/95 (BP Location: Right arm)   Pulse 72   Temp 98.6  F (37  C) (Oral)   Resp 16   Ht 1.803 m (5' 11\")   Wt 115.8 kg (255 lb 3.2 oz)   SpO2 93%   BMI 35.59 kg/m      General: Alert, interactive, in NAD  Resp: Non-labored breathing on RA  Abdomen: Soft, appropriately tender, incisions c/d/i  Ext: Warm and well perfused   De Souza: Clear yellow   Drain: Serosanguinous    I/O  UOP  1525/NR  Drain 160/NR    Labs    Heme:  Recent Labs   Lab 03/01/24  0627   WBC 11.1*   HGB 13.3        Chem:  Recent Labs   Lab 03/01/24  0627 02/29/24  0930     --    POTASSIUM 4.5 3.9   CR 0.93  --        Assessment/Plan  63M POD#1  s/p RALP, BLPLND.    -Home today once tolerating regular diet, walking, CALLIE plan TBD.    Discussed with Dr. Jadon Naranjo MD  Urology Resident     "

## 2024-03-01 NOTE — PROGRESS NOTES
UROLOGY Cross cover    Ultram ordered for discharge and oxycodone discontinued, per Dr. Naranjo.     Kim Ribera PA-C  [facility]  Office: [phone]  After business hours: [phone]

## 2024-03-01 NOTE — DISCHARGE SUMMARY
Discharge Summary     Shon Sargent MRN# [id number]   YOB: 1960 Age: 63 year old     Date of Admission:  2/29/2024  Date of Discharge::  3/1/2024  Admitting Physician:  Elizabeth Diop MD  Discharge Physician:  Tennille Naranjo MD  Primary Care Physician:         [facility]          Admission Diagnoses:   Prostate cancer (H) [C61]  Post-operative state [Z98.890]            Discharge Diagnosis:   Same as above           Procedures:   : Procedure(s):  ROBOT-ASSISTED LAPAROSCOPIC RADICAL PROSTATECTOMY WITH BILATERAL PELVIC LYMPHADENECTOMY        Non-operative procedures:   None performed          Consultations:   None         Imaging Studies:     Results for orders placed or performed in visit on 11/24/23   MR Prostate wo & w Contrast    Addendum: 1/11/2024    Using an image analysis software package ([employer]), three-dimensional  (3D) volumetric images of the prostate were reconstructed by a  radiologist and archived to PACS for prostate lesion analysis and  biopsy planning.  Additionally, the software package was utilized for  contrast kinetic analysis.    I have personally reviewed the examination and initial interpretation  and I agree with the findings.    ROMARIO HOWARD MD         SYSTEM ID:  [id number]      Narrative    MRI PROSTATE: 11/24/2023 4:57 PM    CLINICAL HISTORY: Elevated prostate specific antigen (PSA)     Most Recent PSA: 5.11 ug/L    Comparison: None.    TECHNIQUE:  The following sequences were obtained: High-resolution axial  T2-weighted, coronal T2-weighted, 3D volumetric T2-weighted, axial  pre-contrast T1, axial diffusion-weighted, axial apparent diffusion  coefficient and axial dynamic contrast-enhanced T1. Postcontrast  images were evaluated on a separate workstation to evaluate dynamic  contrast enhancement. The technique of this exam is PI-RADS v2.1  compliant. Contrast dose: 11 ml grady    FINDINGS:  Size: 4.4 x 4.3 x 5.6  cm- 55 grams  Hemorrhage: Absent  Peripheral zone: Homogeneously hyperintense on T2-weighted images.  Suspicious lesions as detailed below.  Transition zone: Enlarged with BPH changes. No suspicious lesions  identified.    Lesion(s) in rank order of severity (highest score- to lowest score,  then by size)     Lesion 1:  Location: Left base peripheral zone at the 5 o'clock position relative  to the urethra. Series 7 image 46.   Additional prostate regions involved: None  Size: 13 x 10 mm  T2 description: Circumscribed moderate T2 hypointensity  T2 numerical assessment: 4  DWI description: Marked hypointensity on ADC and hyperintensity on DWI  DWI numerical assessment: 4  DCE assessment: Positive    Prostate margin: Capsular abutment 6-15 mm with focal bulging and  capsular irregularity   Lesion overall PI-RADS category: 4    Neurovascular bundles: No neurovascular bundle involvement by  malignancy.    Seminal vesicles: No seminal vesicle involvement by malignancy.   Lymph nodes: No lymph node involvement   Bones: No suspicious lesions. Artifact from right hip prosthesis  limits evaluation.   Other pelvic organs: No additional findings.      Impression    IMPRESSION:  1. Based on the most suspicious abnormality, this exam is  characterized as PIRADS 4 - Clinically significant cancer is likely to  be present.  The most suspicious abnormality is located at the left  base peripheral zone at the 5 o'clock position relative to the urethra  and there is high suspicion of extraprostatic extension.  2. No suspicious adenopathy or evidence of pelvic metastases.      PIRADS? v2.1 Assessment Categories   PIRADS 1: Very low (clinically significant cancer is highly unlikely  to be present)   PIRADS 2: Low (clinically significant cancer is unlikely to be  present)   PIRADS 3: Intermediate (the presence of clinically significant cancer  is equivocal)   PIRADS 4: High (clinically significant cancer is likely to be present)    PIRADS  5: Very high (clinically significant cancer is highly likely to  be present)            ROMARIO HOWARD MD         SYSTEM ID:  [id number]            Medications Prior to Admission:     Medications Prior to Admission   Medication Sig Dispense Refill Last Dose    acetaminophen (TYLENOL) 500 MG tablet Take 1,000 mg by mouth as needed for mild pain   Past Week    allopurinol (ZYLOPRIM) 300 MG tablet Take 1 tablet (300 mg) by mouth daily (Patient taking differently: Take 1 tablet by mouth every morning) 90 tablet 3 2/29/2024 at am    fish oil-omega-3 fatty acids 500 MG capsule    More than a month    lisinopril (ZESTRIL) 30 MG tablet Take 1 tablet (30 mg) by mouth daily (Patient taking differently: Take 30 mg by mouth every morning) 90 tablet 3 2/28/2024 at am    multivitamin w/minerals (MULTI-VITAMIN) tablet Take 1 tablet by mouth daily   More than a month    omeprazole (PRILOSEC) 20 MG DR capsule Take 1 capsule (20 mg) by mouth daily (Patient taking differently: Take 20 mg by mouth every morning) 90 capsule 3 02/29/2024 at am    Probiotic Product (FORTIFY DAILY PROBIOTIC PO) Take 1 capsule by mouth daily   More than a month    sildenafil (REVATIO) 20 MG tablet TAKE BY MOUTH AS MANY TABLETS (MAX OF 5 TABS) AS NEEDED AT A TIME PRIOR TO INTERCOURSE. 30 tablet 0 prn    VENTOLIN  (90 Base) MCG/ACT inhaler INHALE 2 PUFFS BY MOUTH EVERY 6 HOURS AS NEEDED FOR SHORTNESS OF BREATH /DYSPNEA  OR  WHEEZING 18 g 0 More than a month            Discharge Medications:     Current Discharge Medication List        START taking these medications    Details   traMADol (ULTRAM) 50 MG tablet Take 1 tablet (50 mg) by mouth every 6 hours as needed for moderate pain  Qty: 10 tablet, Refills: 0    Associated Diagnoses: Post-operative state           CONTINUE these medications which have NOT CHANGED    Details   acetaminophen (TYLENOL) 500 MG tablet Take 1,000 mg by mouth as needed for mild pain      allopurinol (ZYLOPRIM) 300 MG tablet Take  1 tablet (300 mg) by mouth daily  Qty: 90 tablet, Refills: 3    Associated Diagnoses: Chronic gout involving toe without tophus, unspecified cause, unspecified laterality      fish oil-omega-3 fatty acids 500 MG capsule       lisinopril (ZESTRIL) 30 MG tablet Take 1 tablet (30 mg) by mouth daily  Qty: 90 tablet, Refills: 3    Associated Diagnoses: Essential hypertension with goal blood pressure less than 140/90      multivitamin w/minerals (MULTI-VITAMIN) tablet Take 1 tablet by mouth daily      omeprazole (PRILOSEC) 20 MG DR capsule Take 1 capsule (20 mg) by mouth daily  Qty: 90 capsule, Refills: 3    Associated Diagnoses: Gastroesophageal reflux disease without esophagitis      Probiotic Product (FORTIFY DAILY PROBIOTIC PO) Take 1 capsule by mouth daily      sildenafil (REVATIO) 20 MG tablet TAKE BY MOUTH AS MANY TABLETS (MAX OF 5 TABS) AS NEEDED AT A TIME PRIOR TO INTERCOURSE.  Qty: 30 tablet, Refills: 0    Associated Diagnoses: Erectile dysfunction, unspecified erectile dysfunction type      VENTOLIN  (90 Base) MCG/ACT inhaler INHALE 2 PUFFS BY MOUTH EVERY 6 HOURS AS NEEDED FOR SHORTNESS OF BREATH /DYSPNEA  OR  WHEEZING  Qty: 18 g, Refills: 0    Comments: Pharmacy may dispense brand covered by insurance (Proair, or proventil or ventolin or generic albuterol inhaler)  Associated Diagnoses: Cough                    Brief History of Illness:   Reason for admission requiring a surgical or invasive procedure:   Prostate cancer (H) [C61]   The patient underwent the following procedure(s):   See above   There were no immediate complications during this procedure.    Please refer to the full operative summary for details.           Hospital Course:   The patient's hospital course was unremarkable.  Shon Sargent recovered as anticipated and experienced no post-operative complications.      On POD#1 patient was ambulating without assitance, tolerating the discharge diet, had pain controlled with PO medications  "to go home with, and requiring no IV medications or fluids. Patient was discharged home with appropriate contact information, follow-up and instructions as seen below in the discharge paperwork.         Final Pathology Result:   Pending at time of discharge         Discharge Instructions and Follow-Up:     Discharge Procedure Orders   Discharge Instructions   Order Comments: Discharge Diet:   -Regular    Activity:   - No strenuous exercise for 6 weeks.   - No lifting, pushing, pulling more than 10 pounds for 6 weeks. Take care when pushing with your arms to stand up.  - Do not strain your belly area.  When you bend, sit up or twice, you could strain the area around your incision.    - Do not strain with bowel movements.    - Do not drive until you can press the brake pedal quickly and fully without pain.   - Do not operate a motor vehicle while taking narcotic pain medications.     Medications:   1) PAIN: Tramadol is a narcotic medication that has been prescribed for pain.  Narcotics will cause sleepiness and constipation, therefore it is best to stop or reduce them as soon as you can and switch to using acetaminophen (Tylenol) and/or ibuprofen (Advil/Motrin), taken as directed on the packaging.  Keep in mind that certain narcotics can contain acetaminophen, also called \"APAP\" on prescription bottles.  Do not take more than 4,000mg of Tylenol (acetaminophen/ APAP) from all sources in any 24 hour period since this can cause liver damage.  Never drive, operate machinery or drink alcoholic beverages while you are taking narcotic pain medications.     2) CONSTIPATION: Other over the counter solutions such as prune juice, miralax, fiber products, senna can also be used.  Call the office with any concerns.     Wound Care:   - You may shower and get incisions wet starting 48 hrs after surgery.  - Do not scrub incisions or submerge wounds (aka, bath, pool, hot tub, etc.) for 4 weeks or until wounds have healed and catheter " is removed.  - Remove wound dressing 48 hours after surgery if already not removed.   - If purple dermabond glue was used, avoid applying any lotions or ointments.   - Leave incision open to air.  Cover with gauze only if needed for comfort or to protect clothing from drainage.     Drains:  1) De Souza Catheter: You are going home with a De Souza catheter which will remain in place until your follow-up appointment. Your nurse or  will provide written catheter care instructions for you to take home.  - Protect the catheter and treat it like an extension of your body - keep it secured to your leg and do not let it get caught, snagged, or tugged.  - Should the catheter somehow come out of position, do not let anyone but a urologist who is aware of your recent surgery try to replace a catheter.  Best yet, contact our urology office right away with any concerns.   - Apply bacitracin twice daily to the tip of the penis/catheter insertion point to keep the area lubricated and more comfortable.     Follow-Up:   - Call your primary care provider to touch base regarding your recent admission.   - Follow up with Dr. Diop which will be scheduled.  - Call or return sooner than your regularly scheduled visit if you develop any of the following:  Fever (greater than 101.3F), uncontrolled pain, uncontrolled nausea or vomiting, as well as increased redness, swelling, or drainage from your wound.    Phone numbers:  - Nursing phone helpline at the Urology Clinic (8A-5P M-F):  [phone].    - Nights or weekends, call [phone] and ask the  to page the [facility] urologist on call.  - For emergencies, always call [phone]            Discharge Disposition:     Discharged to Home      Condition at discharge: Good    --    Tennille aNranjo MD  Urology Resident    12:57 PM, 3/1/2024

## 2024-03-01 NOTE — PROGRESS NOTES
Patient discharged from gen surg unit with family and NA via wc.  Orientation: Ox4  Respirations status: WNL RA  Ambulation status: Independent  Pain status: Controlled w/ PO pain meds  VS: wnl  PIV: Removed and dressed  Dressings: CALLIE removed and cover w/ abd dressing and is CDI. Surgical incisions CARLOS ALBERTO, CDI  Discharge outcome: After visit summary provided and explained, patient verbalized understanding. Going home w/ indwelling FC, FC home care and maintenance education provided. Left the floor with all of his belongings and medications.

## 2024-03-06 LAB
PATH REPORT.COMMENTS IMP SPEC: ABNORMAL
PATH REPORT.COMMENTS IMP SPEC: ABNORMAL
PATH REPORT.COMMENTS IMP SPEC: YES
PATH REPORT.FINAL DX SPEC: ABNORMAL
PATH REPORT.GROSS SPEC: ABNORMAL
PATH REPORT.MICROSCOPIC SPEC OTHER STN: ABNORMAL
PATH REPORT.MICROSCOPIC SPEC OTHER STN: ABNORMAL
PATH REPORT.RELEVANT HX SPEC: ABNORMAL
PATHOLOGY SYNOPTIC REPORT: ABNORMAL
PHOTO IMAGE: ABNORMAL

## 2024-03-07 ENCOUNTER — NURSE TRIAGE (OUTPATIENT)
Dept: NURSING | Facility: CLINIC | Age: 64
End: 2024-03-07

## 2024-03-07 NOTE — RESULT ENCOUNTER NOTE
I have personally reviewed the pathology results which support a diagnosis of Prostatic adenocarcinoma.  Sites involved in this diagnosis include: Prostate       Elizabeth Diop MD

## 2024-03-08 ENCOUNTER — VIRTUAL VISIT (OUTPATIENT)
Dept: ONCOLOGY | Facility: CLINIC | Age: 64
End: 2024-03-08
Attending: UROLOGY
Payer: COMMERCIAL

## 2024-03-08 ENCOUNTER — ALLIED HEALTH/NURSE VISIT (OUTPATIENT)
Dept: UROLOGY | Facility: CLINIC | Age: 64
End: 2024-03-08
Payer: COMMERCIAL

## 2024-03-08 VITALS — WEIGHT: 255 LBS | BODY MASS INDEX: 35.7 KG/M2 | HEIGHT: 71 IN

## 2024-03-08 DIAGNOSIS — C61 PROSTATE CANCER (H): ICD-10-CM

## 2024-03-08 DIAGNOSIS — C61 PROSTATE CANCER (H): Primary | ICD-10-CM

## 2024-03-08 DIAGNOSIS — Z79.2 PROPHYLACTIC ANTIBIOTIC: Primary | ICD-10-CM

## 2024-03-08 PROCEDURE — 99024 POSTOP FOLLOW-UP VISIT: CPT

## 2024-03-08 PROCEDURE — 99024 POSTOP FOLLOW-UP VISIT: CPT | Mod: 95 | Performed by: UROLOGY

## 2024-03-08 RX ORDER — CIPROFLOXACIN 500 MG/1
500 TABLET, FILM COATED ORAL ONCE
Status: COMPLETED | OUTPATIENT
Start: 2024-03-08 | End: 2024-03-08

## 2024-03-08 RX ADMIN — CIPROFLOXACIN 500 MG: 500 TABLET, FILM COATED ORAL at 10:51

## 2024-03-08 ASSESSMENT — PAIN SCALES - GENERAL: PAINLEVEL: NO PAIN (0)

## 2024-03-08 NOTE — PROGRESS NOTES
"Virtual Visit Details    Type of service:  Video Visit     Originating Location (pt. Location): Home    Distant Location (provider location):  On-site  Platform used for Video Visit: [facility]      Urology Clinic     HPI  Shon Sargent is a 63 year old male with history of prostate cancer status post robotic radical prostatectomy, here for follow-up.    He had his catheter removed locally today.  He denies any major recovery issues after surgery.     No changes to health, hospitalizations or new diagnoses in the interim    PHYSICAL EXAM  Vitals:  No vitals were obtained today due to virtual visit.    Physical Exam   EYES: Eyes grossly normal to inspection.  No discharge or erythema, or obvious scleral/conjunctival abnormalities.  SKIN: Visible skin clear. No significant rash, abnormal pigmentation or lesions.  NEURO: Cranial nerves grossly intact.  Mentation and speech appropriate for age.  GENERAL: Healthy, alert and no distress  RESP: No audible wheeze, cough, or visible cyanosis.  No visible retractions or increased work of breathing.    PSYCH: Mentation appears normal, affect normal/bright, judgement and insight intact, normal speech and appearance well-groomed.      Labs  Lab Results   Component Value Date    WBC 11.1 03/01/2024    WBC 7.8 09/02/2019     Lab Results   Component Value Date    RBC 4.73 03/01/2024    RBC 4.76 09/02/2019     Lab Results   Component Value Date    HGB 13.3 03/01/2024    HGB 13.8 09/02/2019     Lab Results   Component Value Date    HCT 40.3 03/01/2024    HCT 42.8 09/02/2019     No components found for: \"MCT\"  Lab Results   Component Value Date    MCV 85 03/01/2024    MCV 90 09/02/2019     Lab Results   Component Value Date    MCH 28.1 03/01/2024    MCH 29.0 09/02/2019     Lab Results   Component Value Date    MCHC 33.0 03/01/2024    MCHC 32.2 09/02/2019     Lab Results   Component Value Date    RDW 13.6 03/01/2024    RDW 12.8 09/02/2019     Lab Results   Component Value Date     " 03/01/2024     09/02/2019        Last Comprehensive Metabolic Panel:  Sodium   Date Value Ref Range Status   03/01/2024 137 135 - 145 mmol/L Final     Comment:     Reference intervals for this test were updated on 09/26/2023 to more accurately reflect our healthy population. There may be differences in the flagging of prior results with similar values performed with this method. Interpretation of those prior results can be made in the context of the updated reference intervals.    09/02/2019 142 133 - 144 mmol/L Final     Potassium   Date Value Ref Range Status   03/01/2024 4.5 3.4 - 5.3 mmol/L Final   06/20/2022 3.9 3.4 - 5.3 mmol/L Final   09/02/2019 4.5 3.4 - 5.3 mmol/L Final     Chloride   Date Value Ref Range Status   03/01/2024 102 98 - 107 mmol/L Final   06/20/2022 108 94 - 109 mmol/L Final   09/02/2019 112 (H) 94 - 109 mmol/L Final     Carbon Dioxide   Date Value Ref Range Status   09/02/2019 23 20 - 32 mmol/L Final     Carbon Dioxide (CO2)   Date Value Ref Range Status   03/01/2024 23 22 - 29 mmol/L Final   06/20/2022 24 20 - 32 mmol/L Final     Anion Gap   Date Value Ref Range Status   03/01/2024 12 7 - 15 mmol/L Final   06/20/2022 6 3 - 14 mmol/L Final   09/02/2019 7 3 - 14 mmol/L Final     Glucose   Date Value Ref Range Status   03/01/2024 125 (H) 70 - 99 mg/dL Final   06/20/2022 96 70 - 99 mg/dL Final   09/02/2019 109 (H) 70 - 99 mg/dL Final     Urea Nitrogen   Date Value Ref Range Status   03/01/2024 13.9 8.0 - 23.0 mg/dL Final   06/20/2022 16 7 - 30 mg/dL Final   09/02/2019 33 (H) 7 - 30 mg/dL Final     Creatinine   Date Value Ref Range Status   03/01/2024 0.93 0.67 - 1.17 mg/dL Final   09/02/2019 0.85 0.66 - 1.25 mg/dL Final     GFR Estimate   Date Value Ref Range Status   03/01/2024 >90 >60 mL/min/1.73m2 Final   09/02/2019 >90 >60 mL/min/[1.73_m2] Final     Comment:     Non  GFR Calc  Starting 12/18/2018, serum creatinine based estimated GFR (eGFR) will be   calculated using  the Chronic Kidney Disease Epidemiology Collaboration   (CKD-EPI) equation.       Calcium   Date Value Ref Range Status   03/01/2024 8.7 (L) 8.8 - 10.2 mg/dL Final   09/02/2019 7.9 (L) 8.5 - 10.1 mg/dL Final     Bilirubin Total   Date Value Ref Range Status   02/13/2024 0.6 <=1.2 mg/dL Final   09/02/2019 0.5 0.2 - 1.3 mg/dL Final     Alkaline Phosphatase   Date Value Ref Range Status   02/13/2024 98 40 - 150 U/L Final     Comment:     Reference intervals for this test were updated on 11/14/2023 to more accurately reflect our healthy population. There may be differences in the flagging of prior results with similar values performed with this method. Interpretation of those prior results can be made in the context of the updated reference intervals.   09/02/2019 76 40 - 150 U/L Final     ALT   Date Value Ref Range Status   02/13/2024 20 0 - 70 U/L Final     Comment:     Reference intervals for this test were updated on 6/12/2023 to more accurately reflect our healthy population. There may be differences in the flagging of prior results with similar values performed with this method. Interpretation of those prior results can be made in the context of the updated reference intervals.     09/02/2019 25 0 - 70 U/L Final     AST   Date Value Ref Range Status   02/13/2024 22 0 - 45 U/L Final     Comment:     Reference intervals for this test were updated on 6/12/2023 to more accurately reflect our healthy population. There may be differences in the flagging of prior results with similar values performed with this method. Interpretation of those prior results can be made in the context of the updated reference intervals.   09/02/2019 16 0 - 45 U/L Final       PSA   Date Value Ref Range Status   12/27/2019 4.76 (H) 0 - 4 ug/L Final     Comment:     Assay Method:  Chemiluminescence using Siemens Vista analyzer     Prostate Specific Antigen Screen   Date Value Ref Range Status   06/29/2023 5.11 (H) 0.00 - 4.50 ng/mL Final    06/20/2022 4.58 (H) 0.00 - 4.00 ug/L Final     PSA Tumor Marker   Date Value Ref Range Status   10/10/2023 5.27 (H) 0.00 - 4.50 ng/mL Final     Surgical pathology  Final Diagnosis   A.  Right pelvic lymph nodes, lymphadenectomy:  -Two lymph nodes, negative for malignancy (0/2).     B.  Left pelvic lymph node, excision:  -One lymph node, negative for malignancy (0/1).     C.  Prostate and seminal vesicles, robot-assisted laparoscopic radical prostatectomy:  ADENOCARCINOMA, ACINAR TYPE, GRADE GROUP 2 (Bristow Score 3+4=7).  -Viridiana pattern 4 comprises 31-40% of the cancer.  -Tumor involves primarily the left lobe with focal extraprostatic extension.  -Positive for perineural invasion.  -See Tumor Synoptic.      Electronically signed by Oksana Gamino MD on 3/6/2024 at  3:15 PM   Synoptic Checklist   PROSTATE GLAND: Radical Prostatectomy   8th Edition - Protocol posted: 11/10/2021PROSTATE GLAND: RADICAL PROSTATECTOMY - All Specimens  SPECIMEN   Procedure  Radical prostatectomy   Prostate Size     Prostate Weight (Grams)  49.3 g   Prostate Greatest Dimension (Centimeters)  5 cm   Additional Prostate Dimension (Centimeters)  4.2 cm     3.3 cm   TUMOR   Histologic Type  Acinar adenocarcinoma   Histologic Grade     Grade  Grade group 2 (Viridiana Score 3 + 4 = 7)   Minor Tertiary Pattern 5 (less than 5%)  Not applicable   Percentage of Pattern 4  31 - 40%   Intraductal Carcinoma (IDC)  Not identified   Cribriform Glands  Present   Treatment Effect  No known presurgical therapy   TUMOR QUANTITATION     Estimated Percentage of Prostate Involved by Tumor  21 - 30%   Extraprostatic Extension (EPE)  Present, focal   Location of Extraprostatic Extension  Left postero-lateral (neurovascular bundle)   Urinary Bladder Neck Invasion  Not identified   Seminal Vesicle Invasion  Not identified   Lymphovascular Invasion  Not Identified   MARGINS   Margin Status  All margins negative for invasive carcinoma   Margin Comment  Margin is  negative by <1 mm at the area of extraprostatic extension   REGIONAL LYMPH NODES   Regional Lymph Node Status  All regional lymph nodes negative for tumor   Number of Lymph Nodes Examined  3            ASSESSMENT AND PLAN  63 year old male with pT3 N0 prostatic adenocarcinoma status post robotic radical prostatectomy and pelvic node dissection on 2/29/2024 doing well      Plan   Start Kegels exercises  Follow-up in 3 months with PSA prior    30 total minutes spent on the date of the encounter including direct interaction with the patient, performing chart review, documentation and further activities as noted above    Elizabeth Diop MD   Department of Urology   [facility]

## 2024-03-08 NOTE — NURSING NOTE
Is the patient currently in the state of [place]? YES    Visit mode:VIDEO    If the visit is dropped, the patient can be reconnected by: VIDEO VISIT: Send to e-mail at: [email or web address]    Will anyone else be joining the visit? NO  (If patient encounters technical issues they should call [phone]    How would you like to obtain your AVS? MyChart    Are changes needed to the allergy or medication list? Pt stated no changes to allergies and Pt stated no med changes    Reason for visit: NAVEED Leon LPN

## 2024-03-08 NOTE — NURSING NOTE
Chief Complaint   Patient presents with    Catheterization (insertion/replacement)     Dr Diop Pt for Trial Office Void/ catheter removal ok'd by Roopa TRAVIS   Patient is post Prostatectomy.  Verbal Order has been verified under Roopa TRAVIS who is also the In-Clinic overseeing Provider  After discussion of procedure today and patient verbal agreement,  RN instilled NS via closed gravity system (total volume of 285ml) until patient statement of strong urge to urinate.  Catheter successfully removed at 10:20am without immediate complication.  Patient allowed personal time to void  Pt measured 175ml  Bladder Scanner revealed 5ml  Report given to Provider, CARLO :OK to go home without De Souza and see Dr Diop as scheduled today.  One Cipro 500 mg given per protocol: Yes. See MAR for documentation  Patient did tolerate procedure well.    Patient instructed as to where to call or go for pain, fever, or decreased urine flow.     Susana Arita RN

## 2024-03-08 NOTE — TELEPHONE ENCOUNTER
Pt is calling with concerns about prostate removal 7 days ago, has an appointment tomorrow to have urinary catheter remove. States urinary Bag started to leak tonight, tried using a leg bag but can feel it back flow. Currently right now pt has leaking bag hanging with a container underneath it. Feels anxious laying in bed with leaking bag. Wants to know if he can go into ED tonight to have it replace or catheter removed. Pt denies other symptoms.    Advise pt he can go to ED for evaluation or wait until his appointment tomorrow. Will need to be seen in ED for evaluation to be removed.    Jimbo Dyson RN, BSN  3/7/2024 at 7:38 PM  [facility]      Reason for Disposition    General information question, no triage required and triager able to answer question    Protocols used: Information Only Call - No Triage-A-

## 2024-03-08 NOTE — LETTER
"    3/8/2024         RE: Shon Sargent  [phone]  [facility] [address]        Dear Colleague,    Thank you for referring your patient, Shon Sargent, to the [facility]. Please see a copy of my visit note below.    Virtual Visit Details    Type of service:  Video Visit     Originating Location (pt. Location): Home    Distant Location (provider location):  On-site  Platform used for Video Visit: [facility]      Urology Clinic     HPI  Shon Sargent is a 63 year old male with history of prostate cancer status post robotic radical prostatectomy, here for follow-up.    He had his catheter removed locally today.  He denies any major recovery issues after surgery.     No changes to health, hospitalizations or new diagnoses in the interim    PHYSICAL EXAM  Vitals:  No vitals were obtained today due to virtual visit.    Physical Exam   EYES: Eyes grossly normal to inspection.  No discharge or erythema, or obvious scleral/conjunctival abnormalities.  SKIN: Visible skin clear. No significant rash, abnormal pigmentation or lesions.  NEURO: Cranial nerves grossly intact.  Mentation and speech appropriate for age.  GENERAL: Healthy, alert and no distress  RESP: No audible wheeze, cough, or visible cyanosis.  No visible retractions or increased work of breathing.    PSYCH: Mentation appears normal, affect normal/bright, judgement and insight intact, normal speech and appearance well-groomed.      Labs  Lab Results   Component Value Date    WBC 11.1 03/01/2024    WBC 7.8 09/02/2019     Lab Results   Component Value Date    RBC 4.73 03/01/2024    RBC 4.76 09/02/2019     Lab Results   Component Value Date    HGB 13.3 03/01/2024    HGB 13.8 09/02/2019     Lab Results   Component Value Date    HCT 40.3 03/01/2024    HCT 42.8 09/02/2019     No components found for: \"MCT\"  Lab Results   Component Value Date    MCV 85 03/01/2024    MCV 90 09/02/2019     Lab Results   Component Value Date    MCH 28.1 " 03/01/2024    MCH 29.0 09/02/2019     Lab Results   Component Value Date    MCHC 33.0 03/01/2024    MCHC 32.2 09/02/2019     Lab Results   Component Value Date    RDW 13.6 03/01/2024    RDW 12.8 09/02/2019     Lab Results   Component Value Date     03/01/2024     09/02/2019        Last Comprehensive Metabolic Panel:  Sodium   Date Value Ref Range Status   03/01/2024 137 135 - 145 mmol/L Final     Comment:     Reference intervals for this test were updated on 09/26/2023 to more accurately reflect our healthy population. There may be differences in the flagging of prior results with similar values performed with this method. Interpretation of those prior results can be made in the context of the updated reference intervals.    09/02/2019 142 133 - 144 mmol/L Final     Potassium   Date Value Ref Range Status   03/01/2024 4.5 3.4 - 5.3 mmol/L Final   06/20/2022 3.9 3.4 - 5.3 mmol/L Final   09/02/2019 4.5 3.4 - 5.3 mmol/L Final     Chloride   Date Value Ref Range Status   03/01/2024 102 98 - 107 mmol/L Final   06/20/2022 108 94 - 109 mmol/L Final   09/02/2019 112 (H) 94 - 109 mmol/L Final     Carbon Dioxide   Date Value Ref Range Status   09/02/2019 23 20 - 32 mmol/L Final     Carbon Dioxide (CO2)   Date Value Ref Range Status   03/01/2024 23 22 - 29 mmol/L Final   06/20/2022 24 20 - 32 mmol/L Final     Anion Gap   Date Value Ref Range Status   03/01/2024 12 7 - 15 mmol/L Final   06/20/2022 6 3 - 14 mmol/L Final   09/02/2019 7 3 - 14 mmol/L Final     Glucose   Date Value Ref Range Status   03/01/2024 125 (H) 70 - 99 mg/dL Final   06/20/2022 96 70 - 99 mg/dL Final   09/02/2019 109 (H) 70 - 99 mg/dL Final     Urea Nitrogen   Date Value Ref Range Status   03/01/2024 13.9 8.0 - 23.0 mg/dL Final   06/20/2022 16 7 - 30 mg/dL Final   09/02/2019 33 (H) 7 - 30 mg/dL Final     Creatinine   Date Value Ref Range Status   03/01/2024 0.93 0.67 - 1.17 mg/dL Final   09/02/2019 0.85 0.66 - 1.25 mg/dL Final     GFR Estimate    Date Value Ref Range Status   03/01/2024 >90 >60 mL/min/1.73m2 Final   09/02/2019 >90 >60 mL/min/[1.73_m2] Final     Comment:     Non  GFR Calc  Starting 12/18/2018, serum creatinine based estimated GFR (eGFR) will be   calculated using the Chronic Kidney Disease Epidemiology Collaboration   (CKD-EPI) equation.       Calcium   Date Value Ref Range Status   03/01/2024 8.7 (L) 8.8 - 10.2 mg/dL Final   09/02/2019 7.9 (L) 8.5 - 10.1 mg/dL Final     Bilirubin Total   Date Value Ref Range Status   02/13/2024 0.6 <=1.2 mg/dL Final   09/02/2019 0.5 0.2 - 1.3 mg/dL Final     Alkaline Phosphatase   Date Value Ref Range Status   02/13/2024 98 40 - 150 U/L Final     Comment:     Reference intervals for this test were updated on 11/14/2023 to more accurately reflect our healthy population. There may be differences in the flagging of prior results with similar values performed with this method. Interpretation of those prior results can be made in the context of the updated reference intervals.   09/02/2019 76 40 - 150 U/L Final     ALT   Date Value Ref Range Status   02/13/2024 20 0 - 70 U/L Final     Comment:     Reference intervals for this test were updated on 6/12/2023 to more accurately reflect our healthy population. There may be differences in the flagging of prior results with similar values performed with this method. Interpretation of those prior results can be made in the context of the updated reference intervals.     09/02/2019 25 0 - 70 U/L Final     AST   Date Value Ref Range Status   02/13/2024 22 0 - 45 U/L Final     Comment:     Reference intervals for this test were updated on 6/12/2023 to more accurately reflect our healthy population. There may be differences in the flagging of prior results with similar values performed with this method. Interpretation of those prior results can be made in the context of the updated reference intervals.   09/02/2019 16 0 - 45 U/L Final       PSA   Date  Value Ref Range Status   12/27/2019 4.76 (H) 0 - 4 ug/L Final     Comment:     Assay Method:  Chemiluminescence using Siemens Vista analyzer     Prostate Specific Antigen Screen   Date Value Ref Range Status   06/29/2023 5.11 (H) 0.00 - 4.50 ng/mL Final   06/20/2022 4.58 (H) 0.00 - 4.00 ug/L Final     PSA Tumor Marker   Date Value Ref Range Status   10/10/2023 5.27 (H) 0.00 - 4.50 ng/mL Final     Surgical pathology  Final Diagnosis   A.  Right pelvic lymph nodes, lymphadenectomy:  -Two lymph nodes, negative for malignancy (0/2).     B.  Left pelvic lymph node, excision:  -One lymph node, negative for malignancy (0/1).     C.  Prostate and seminal vesicles, robot-assisted laparoscopic radical prostatectomy:  ADENOCARCINOMA, ACINAR TYPE, GRADE GROUP 2 (Viridiana Score 3+4=7).  -Burnside pattern 4 comprises 31-40% of the cancer.  -Tumor involves primarily the left lobe with focal extraprostatic extension.  -Positive for perineural invasion.  -See Tumor Synoptic.      Electronically signed by Oksana Gamino MD on 3/6/2024 at  3:15 PM   Synoptic Checklist   PROSTATE GLAND: Radical Prostatectomy   8th Edition - Protocol posted: 11/10/2021PROSTATE GLAND: RADICAL PROSTATECTOMY - All Specimens  SPECIMEN   Procedure  Radical prostatectomy   Prostate Size     Prostate Weight (Grams)  49.3 g   Prostate Greatest Dimension (Centimeters)  5 cm   Additional Prostate Dimension (Centimeters)  4.2 cm     3.3 cm   TUMOR   Histologic Type  Acinar adenocarcinoma   Histologic Grade     Grade  Grade group 2 (Burnside Score 3 + 4 = 7)   Minor Tertiary Pattern 5 (less than 5%)  Not applicable   Percentage of Pattern 4  31 - 40%   Intraductal Carcinoma (IDC)  Not identified   Cribriform Glands  Present   Treatment Effect  No known presurgical therapy   TUMOR QUANTITATION     Estimated Percentage of Prostate Involved by Tumor  21 - 30%   Extraprostatic Extension (EPE)  Present, focal   Location of Extraprostatic Extension  Left postero-lateral  (neurovascular bundle)   Urinary Bladder Neck Invasion  Not identified   Seminal Vesicle Invasion  Not identified   Lymphovascular Invasion  Not Identified   MARGINS   Margin Status  All margins negative for invasive carcinoma   Margin Comment  Margin is negative by <1 mm at the area of extraprostatic extension   REGIONAL LYMPH NODES   Regional Lymph Node Status  All regional lymph nodes negative for tumor   Number of Lymph Nodes Examined  3            ASSESSMENT AND PLAN  63 year old male with pT3 N0 prostatic adenocarcinoma status post robotic radical prostatectomy and pelvic node dissection on 2/29/2024 doing well      Plan   Start Kegels exercises  Follow-up in 3 months with PSA prior    30 total minutes spent on the date of the encounter including direct interaction with the patient, performing chart review, documentation and further activities as noted above    Elizabeth Diop MD   Department of Urology   [facility]                   Again, thank you for allowing me to participate in the care of your patient.        Sincerely,        Elizabeth Diop MD

## 2024-03-08 NOTE — LETTER
"    3/8/2024         RE: Shon Sargent  [address]  [facility] [address]        Dear Colleague,    Thank you for referring your patient, Shon Sargent, to the [facility]. Please see a copy of my visit note below.    Virtual Visit Details    Type of service:  Video Visit     Originating Location (pt. Location): Home    Distant Location (provider location):  On-site  Platform used for Video Visit: [facility]      Urology Clinic     HPI  Shon Sargent is a 63 year old male with history of prostate cancer status post robotic radical prostatectomy, here for follow-up.    He had his catheter removed locally today.  He denies any major recovery issues after surgery.     No changes to health, hospitalizations or new diagnoses in the interim    PHYSICAL EXAM  Vitals:  No vitals were obtained today due to virtual visit.    Physical Exam   EYES: Eyes grossly normal to inspection.  No discharge or erythema, or obvious scleral/conjunctival abnormalities.  SKIN: Visible skin clear. No significant rash, abnormal pigmentation or lesions.  NEURO: Cranial nerves grossly intact.  Mentation and speech appropriate for age.  GENERAL: Healthy, alert and no distress  RESP: No audible wheeze, cough, or visible cyanosis.  No visible retractions or increased work of breathing.    PSYCH: Mentation appears normal, affect normal/bright, judgement and insight intact, normal speech and appearance well-groomed.      Labs  Lab Results   Component Value Date    WBC 11.1 03/01/2024    WBC 7.8 09/02/2019     Lab Results   Component Value Date    RBC 4.73 03/01/2024    RBC 4.76 09/02/2019     Lab Results   Component Value Date    HGB 13.3 03/01/2024    HGB 13.8 09/02/2019     Lab Results   Component Value Date    HCT 40.3 03/01/2024    HCT 42.8 09/02/2019     No components found for: \"MCT\"  Lab Results   Component Value Date    MCV 85 03/01/2024    MCV 90 09/02/2019     Lab Results   Component Value Date    MCH 28.1 " 03/01/2024    MCH 29.0 09/02/2019     Lab Results   Component Value Date    MCHC 33.0 03/01/2024    MCHC 32.2 09/02/2019     Lab Results   Component Value Date    RDW 13.6 03/01/2024    RDW 12.8 09/02/2019     Lab Results   Component Value Date     03/01/2024     09/02/2019        Last Comprehensive Metabolic Panel:  Sodium   Date Value Ref Range Status   03/01/2024 137 135 - 145 mmol/L Final     Comment:     Reference intervals for this test were updated on 09/26/2023 to more accurately reflect our healthy population. There may be differences in the flagging of prior results with similar values performed with this method. Interpretation of those prior results can be made in the context of the updated reference intervals.    09/02/2019 142 133 - 144 mmol/L Final     Potassium   Date Value Ref Range Status   03/01/2024 4.5 3.4 - 5.3 mmol/L Final   06/20/2022 3.9 3.4 - 5.3 mmol/L Final   09/02/2019 4.5 3.4 - 5.3 mmol/L Final     Chloride   Date Value Ref Range Status   03/01/2024 102 98 - 107 mmol/L Final   06/20/2022 108 94 - 109 mmol/L Final   09/02/2019 112 (H) 94 - 109 mmol/L Final     Carbon Dioxide   Date Value Ref Range Status   09/02/2019 23 20 - 32 mmol/L Final     Carbon Dioxide (CO2)   Date Value Ref Range Status   03/01/2024 23 22 - 29 mmol/L Final   06/20/2022 24 20 - 32 mmol/L Final     Anion Gap   Date Value Ref Range Status   03/01/2024 12 7 - 15 mmol/L Final   06/20/2022 6 3 - 14 mmol/L Final   09/02/2019 7 3 - 14 mmol/L Final     Glucose   Date Value Ref Range Status   03/01/2024 125 (H) 70 - 99 mg/dL Final   06/20/2022 96 70 - 99 mg/dL Final   09/02/2019 109 (H) 70 - 99 mg/dL Final     Urea Nitrogen   Date Value Ref Range Status   03/01/2024 13.9 8.0 - 23.0 mg/dL Final   06/20/2022 16 7 - 30 mg/dL Final   09/02/2019 33 (H) 7 - 30 mg/dL Final     Creatinine   Date Value Ref Range Status   03/01/2024 0.93 0.67 - 1.17 mg/dL Final   09/02/2019 0.85 0.66 - 1.25 mg/dL Final     GFR Estimate    Date Value Ref Range Status   03/01/2024 >90 >60 mL/min/1.73m2 Final   09/02/2019 >90 >60 mL/min/[1.73_m2] Final     Comment:     Non  GFR Calc  Starting 12/18/2018, serum creatinine based estimated GFR (eGFR) will be   calculated using the Chronic Kidney Disease Epidemiology Collaboration   (CKD-EPI) equation.       Calcium   Date Value Ref Range Status   03/01/2024 8.7 (L) 8.8 - 10.2 mg/dL Final   09/02/2019 7.9 (L) 8.5 - 10.1 mg/dL Final     Bilirubin Total   Date Value Ref Range Status   02/13/2024 0.6 <=1.2 mg/dL Final   09/02/2019 0.5 0.2 - 1.3 mg/dL Final     Alkaline Phosphatase   Date Value Ref Range Status   02/13/2024 98 40 - 150 U/L Final     Comment:     Reference intervals for this test were updated on 11/14/2023 to more accurately reflect our healthy population. There may be differences in the flagging of prior results with similar values performed with this method. Interpretation of those prior results can be made in the context of the updated reference intervals.   09/02/2019 76 40 - 150 U/L Final     ALT   Date Value Ref Range Status   02/13/2024 20 0 - 70 U/L Final     Comment:     Reference intervals for this test were updated on 6/12/2023 to more accurately reflect our healthy population. There may be differences in the flagging of prior results with similar values performed with this method. Interpretation of those prior results can be made in the context of the updated reference intervals.     09/02/2019 25 0 - 70 U/L Final     AST   Date Value Ref Range Status   02/13/2024 22 0 - 45 U/L Final     Comment:     Reference intervals for this test were updated on 6/12/2023 to more accurately reflect our healthy population. There may be differences in the flagging of prior results with similar values performed with this method. Interpretation of those prior results can be made in the context of the updated reference intervals.   09/02/2019 16 0 - 45 U/L Final       PSA   Date  Value Ref Range Status   12/27/2019 4.76 (H) 0 - 4 ug/L Final     Comment:     Assay Method:  Chemiluminescence using Siemens Vista analyzer     Prostate Specific Antigen Screen   Date Value Ref Range Status   06/29/2023 5.11 (H) 0.00 - 4.50 ng/mL Final   06/20/2022 4.58 (H) 0.00 - 4.00 ug/L Final     PSA Tumor Marker   Date Value Ref Range Status   10/10/2023 5.27 (H) 0.00 - 4.50 ng/mL Final     Surgical pathology  Final Diagnosis   A.  Right pelvic lymph nodes, lymphadenectomy:  -Two lymph nodes, negative for malignancy (0/2).     B.  Left pelvic lymph node, excision:  -One lymph node, negative for malignancy (0/1).     C.  Prostate and seminal vesicles, robot-assisted laparoscopic radical prostatectomy:  ADENOCARCINOMA, ACINAR TYPE, GRADE GROUP 2 (Viridiana Score 3+4=7).  -Tropic pattern 4 comprises 31-40% of the cancer.  -Tumor involves primarily the left lobe with focal extraprostatic extension.  -Positive for perineural invasion.  -See Tumor Synoptic.      Electronically signed by Oksana Gamino MD on 3/6/2024 at  3:15 PM   Synoptic Checklist   PROSTATE GLAND: Radical Prostatectomy   8th Edition - Protocol posted: 11/10/2021PROSTATE GLAND: RADICAL PROSTATECTOMY - All Specimens  SPECIMEN   Procedure  Radical prostatectomy   Prostate Size     Prostate Weight (Grams)  49.3 g   Prostate Greatest Dimension (Centimeters)  5 cm   Additional Prostate Dimension (Centimeters)  4.2 cm     3.3 cm   TUMOR   Histologic Type  Acinar adenocarcinoma   Histologic Grade     Grade  Grade group 2 (Tropic Score 3 + 4 = 7)   Minor Tertiary Pattern 5 (less than 5%)  Not applicable   Percentage of Pattern 4  31 - 40%   Intraductal Carcinoma (IDC)  Not identified   Cribriform Glands  Present   Treatment Effect  No known presurgical therapy   TUMOR QUANTITATION     Estimated Percentage of Prostate Involved by Tumor  21 - 30%   Extraprostatic Extension (EPE)  Present, focal   Location of Extraprostatic Extension  Left postero-lateral  (neurovascular bundle)   Urinary Bladder Neck Invasion  Not identified   Seminal Vesicle Invasion  Not identified   Lymphovascular Invasion  Not Identified   MARGINS   Margin Status  All margins negative for invasive carcinoma   Margin Comment  Margin is negative by <1 mm at the area of extraprostatic extension   REGIONAL LYMPH NODES   Regional Lymph Node Status  All regional lymph nodes negative for tumor   Number of Lymph Nodes Examined  3            ASSESSMENT AND PLAN  63 year old male with pT3 N0 prostatic adenocarcinoma status post robotic radical prostatectomy and pelvic node dissection on 2/29/2024 doing well      Plan   Start Kegels exercises  Follow-up in 3 months with PSA prior    30 total minutes spent on the date of the encounter including direct interaction with the patient, performing chart review, documentation and further activities as noted above    Elizabeth Diop MD   Department of Urology   [facility]                   Again, thank you for allowing me to participate in the care of your patient.        Sincerely,        Elizabeth Diop MD

## 2024-03-28 ENCOUNTER — VIRTUAL VISIT (OUTPATIENT)
Dept: FAMILY MEDICINE | Facility: CLINIC | Age: 64
End: 2024-03-28
Payer: COMMERCIAL

## 2024-03-28 DIAGNOSIS — Z09 HOSPITAL DISCHARGE FOLLOW-UP: Primary | ICD-10-CM

## 2024-03-28 PROCEDURE — 99442 PR PHYSICIAN TELEPHONE EVALUATION 11-20 MIN: CPT | Mod: 93 | Performed by: NURSE PRACTITIONER

## 2024-03-28 NOTE — PROGRESS NOTES
"Shon is a 63 year old who is being evaluated via a billable telephone visit.    What phone number would you like to be contacted at? [phone]  How would you like to obtain your AVS? Marissa  Originating Location (pt. Location): Home    Distant Location (provider location):  On-site    Assessment & Plan     (Z09) Hospital discharge follow-up  (primary encounter diagnosis)  Comment: Patient is doing well postop has had follow-up with urology and oncology reviewed importance of preventative care annual visit will continue to work with urology oncology regarding his prostate cancer  Plan:       MED REC REQUIRED  Post Medication Reconciliation Status: discharge medications reconciled, continue medications without change  BMI  Estimated body mass index is 35.57 kg/m  as calculated from the following:    Height as of 3/8/24: 1.803 m (5' 11\").    Weight as of 3/8/24: 115.7 kg (255 lb).   Weight management plan: Discussed healthy diet and exercise guidelines      See Patient Instructions    Subjective   Shon is a 63 year old, presenting for the following health issues:  Hospital F/U      3/28/2024     2:11 PM   Additional Questions   Roomed by Sharita Spann for tremors and B/p     Hospital Follow-up Visit:    Hospital/Nursing Home/IP Rehab Facility: [facility]  Date of Admission: 2/29/24  Date of Discharge: 3/1/24  Reason(s) for Admission: post operative state    Was your hospitalization related to COVID-19? No   Problems taking medications regularly:  None  Medication changes since discharge: None  Problems adhering to non-medication therapy:  None    Summary of hospitalization:  [facility] discharge summary reviewed  Diagnostic Tests/Treatments reviewed.  Follow up needed: none  Other Healthcare Providers Involved in Patient s Care:          Oncology urology  Update since discharge: improved.         Plan of care communicated with patient                 Review " of Systems  Constitutional, HEENT, cardiovascular, pulmonary, gi and gu systems are negative, except as otherwise noted.      Objective           Vitals:  No vitals were obtained today due to virtual visit.    Physical Exam   General: Alert and no distress //Respiratory: No audible wheeze, cough, or shortness of breath // Psychiatric:  Appropriate affect, tone, and pace of words      No results found for any visits on 03/28/24.      Phone call duration: 15 minutes  Signed Electronically by: TEO Roe CNP

## 2024-04-01 ENCOUNTER — DOCUMENTATION ONLY (OUTPATIENT)
Dept: FAMILY MEDICINE | Facility: CLINIC | Age: 64
End: 2024-04-01

## 2024-04-01 DIAGNOSIS — I10 ESSENTIAL HYPERTENSION WITH GOAL BLOOD PRESSURE LESS THAN 140/90: Primary | ICD-10-CM

## 2024-04-02 ENCOUNTER — TELEPHONE (OUTPATIENT)
Dept: FAMILY MEDICINE | Facility: CLINIC | Age: 64
End: 2024-04-02

## 2024-04-02 ENCOUNTER — LAB (OUTPATIENT)
Dept: LAB | Facility: CLINIC | Age: 64
End: 2024-04-02
Payer: COMMERCIAL

## 2024-04-02 ENCOUNTER — ALLIED HEALTH/NURSE VISIT (OUTPATIENT)
Dept: FAMILY MEDICINE | Facility: CLINIC | Age: 64
End: 2024-04-02
Payer: COMMERCIAL

## 2024-04-02 VITALS — SYSTOLIC BLOOD PRESSURE: 144 MMHG | DIASTOLIC BLOOD PRESSURE: 96 MMHG | HEART RATE: 80 BPM

## 2024-04-02 DIAGNOSIS — I10 ESSENTIAL HYPERTENSION WITH GOAL BLOOD PRESSURE LESS THAN 140/90: ICD-10-CM

## 2024-04-02 DIAGNOSIS — Z11.4 SCREENING FOR HIV (HUMAN IMMUNODEFICIENCY VIRUS): Primary | ICD-10-CM

## 2024-04-02 DIAGNOSIS — I10 ESSENTIAL HYPERTENSION WITH GOAL BLOOD PRESSURE LESS THAN 140/90: Primary | ICD-10-CM

## 2024-04-02 LAB
ANION GAP SERPL CALCULATED.3IONS-SCNC: 12 MMOL/L (ref 7–15)
BUN SERPL-MCNC: 14.7 MG/DL (ref 8–23)
CALCIUM SERPL-MCNC: 9.5 MG/DL (ref 8.8–10.2)
CHLORIDE SERPL-SCNC: 105 MMOL/L (ref 98–107)
CREAT SERPL-MCNC: 1.01 MG/DL (ref 0.67–1.17)
DEPRECATED HCO3 PLAS-SCNC: 25 MMOL/L (ref 22–29)
EGFRCR SERPLBLD CKD-EPI 2021: 84 ML/MIN/1.73M2
ERYTHROCYTE [DISTWIDTH] IN BLOOD BY AUTOMATED COUNT: 13.5 % (ref 10–15)
GLUCOSE SERPL-MCNC: 116 MG/DL (ref 70–99)
HCT VFR BLD AUTO: 45.3 % (ref 40–53)
HGB BLD-MCNC: 14.3 G/DL (ref 13.3–17.7)
MCH RBC QN AUTO: 27.4 PG (ref 26.5–33)
MCHC RBC AUTO-ENTMCNC: 31.6 G/DL (ref 31.5–36.5)
MCV RBC AUTO: 87 FL (ref 78–100)
PLATELET # BLD AUTO: 183 10E3/UL (ref 150–450)
POTASSIUM SERPL-SCNC: 4.4 MMOL/L (ref 3.4–5.3)
RBC # BLD AUTO: 5.22 10E6/UL (ref 4.4–5.9)
SODIUM SERPL-SCNC: 142 MMOL/L (ref 135–145)
WBC # BLD AUTO: 5.6 10E3/UL (ref 4–11)

## 2024-04-02 PROCEDURE — 80048 BASIC METABOLIC PNL TOTAL CA: CPT

## 2024-04-02 PROCEDURE — 99207 PR NO CHARGE NURSE ONLY: CPT

## 2024-04-02 PROCEDURE — 85027 COMPLETE CBC AUTOMATED: CPT

## 2024-04-02 PROCEDURE — 36415 COLL VENOUS BLD VENIPUNCTURE: CPT

## 2024-04-02 RX ORDER — AMLODIPINE BESYLATE 5 MG/1
5 TABLET ORAL DAILY
Qty: 90 TABLET | Refills: 3 | Status: SHIPPED | OUTPATIENT
Start: 2024-04-02 | End: 2024-06-10

## 2024-04-02 NOTE — PROGRESS NOTES
Shon Sargent is a 63 year old year old patient who comes in today for a Blood Pressure check because of ongoing blood pressure monitoring, F/U 03-28-24  hosp F/U .  BP Readings from Last 6 Encounters:   04/02/24 (!) 144/96   03/01/24 (!) 158/95   02/13/24 (!) 143/93   01/17/24 (!) 142/100   06/29/23 139/84   11/23/22 138/83      Initial /96, HR 80  Recheck 144/96  Patient  taking medication as prescribed  Patient is tolerating medications well.  Patient is not monitoring Blood Pressure at home.    Current complaints: none  Disposition:  Forwarded to SHEILA Reaves, for review. Await lab results.  AYDE Bergeron RN

## 2024-04-03 NOTE — TELEPHONE ENCOUNTER
Addressed in result note please see will increase his Norvasc as well as have him recheck blood pressures in 1 to 2 weeks with nurse only visit..    Jelly Boswell CNP

## 2024-04-21 ENCOUNTER — APPOINTMENT (OUTPATIENT)
Dept: ULTRASOUND IMAGING | Facility: CLINIC | Age: 64
End: 2024-04-21
Attending: FAMILY MEDICINE
Payer: COMMERCIAL

## 2024-04-21 ENCOUNTER — HOSPITAL ENCOUNTER (EMERGENCY)
Facility: CLINIC | Age: 64
Discharge: HOME | End: 2024-04-21
Attending: FAMILY MEDICINE | Admitting: FAMILY MEDICINE
Payer: COMMERCIAL

## 2024-04-21 VITALS
SYSTOLIC BLOOD PRESSURE: 144 MMHG | BODY MASS INDEX: 34.87 KG/M2 | HEART RATE: 75 BPM | WEIGHT: 250 LBS | DIASTOLIC BLOOD PRESSURE: 93 MMHG | TEMPERATURE: 98.2 F | OXYGEN SATURATION: 96 % | RESPIRATION RATE: 18 BRPM

## 2024-04-21 DIAGNOSIS — K80.50 BILIARY COLIC: ICD-10-CM

## 2024-04-21 LAB
ALBUMIN SERPL BCG-MCNC: 4.4 G/DL (ref 3.5–5.2)
ALBUMIN UR-MCNC: NEGATIVE MG/DL
ALP SERPL-CCNC: 114 U/L (ref 40–150)
ALT SERPL W P-5'-P-CCNC: 23 U/L (ref 0–70)
ANION GAP SERPL CALCULATED.3IONS-SCNC: 14 MMOL/L (ref 7–15)
APPEARANCE UR: CLEAR
AST SERPL W P-5'-P-CCNC: 24 U/L (ref 0–45)
BASOPHILS # BLD AUTO: 0.1 10E3/UL (ref 0–0.2)
BASOPHILS NFR BLD AUTO: 1 %
BILIRUB SERPL-MCNC: 0.5 MG/DL
BILIRUB UR QL STRIP: NEGATIVE
BUN SERPL-MCNC: 12.4 MG/DL (ref 8–23)
CALCIUM SERPL-MCNC: 9.3 MG/DL (ref 8.8–10.2)
CHLORIDE SERPL-SCNC: 103 MMOL/L (ref 98–107)
COLOR UR AUTO: YELLOW
CREAT SERPL-MCNC: 0.89 MG/DL (ref 0.67–1.17)
DEPRECATED HCO3 PLAS-SCNC: 21 MMOL/L (ref 22–29)
EGFRCR SERPLBLD CKD-EPI 2021: >90 ML/MIN/1.73M2
EOSINOPHIL # BLD AUTO: 0.1 10E3/UL (ref 0–0.7)
EOSINOPHIL NFR BLD AUTO: 1 %
ERYTHROCYTE [DISTWIDTH] IN BLOOD BY AUTOMATED COUNT: 13.2 % (ref 10–15)
GLUCOSE SERPL-MCNC: 122 MG/DL (ref 70–99)
GLUCOSE UR STRIP-MCNC: NEGATIVE MG/DL
HCT VFR BLD AUTO: 45 % (ref 40–53)
HGB BLD-MCNC: 14.8 G/DL (ref 13.3–17.7)
HGB UR QL STRIP: NEGATIVE
IMM GRANULOCYTES # BLD: 0 10E3/UL
IMM GRANULOCYTES NFR BLD: 0 %
KETONES UR STRIP-MCNC: NEGATIVE MG/DL
LACTATE SERPL-SCNC: 1.5 MMOL/L (ref 0.7–2)
LEUKOCYTE ESTERASE UR QL STRIP: NEGATIVE
LIPASE SERPL-CCNC: 24 U/L (ref 13–60)
LYMPHOCYTES # BLD AUTO: 1.6 10E3/UL (ref 0.8–5.3)
LYMPHOCYTES NFR BLD AUTO: 16 %
MCH RBC QN AUTO: 28 PG (ref 26.5–33)
MCHC RBC AUTO-ENTMCNC: 32.9 G/DL (ref 31.5–36.5)
MCV RBC AUTO: 85 FL (ref 78–100)
MONOCYTES # BLD AUTO: 0.6 10E3/UL (ref 0–1.3)
MONOCYTES NFR BLD AUTO: 6 %
NEUTROPHILS # BLD AUTO: 7.8 10E3/UL (ref 1.6–8.3)
NEUTROPHILS NFR BLD AUTO: 76 %
NITRATE UR QL: NEGATIVE
NRBC # BLD AUTO: 0 10E3/UL
NRBC BLD AUTO-RTO: 0 /100
PH UR STRIP: 5 [PH] (ref 5–7)
PLATELET # BLD AUTO: 222 10E3/UL (ref 150–450)
POTASSIUM SERPL-SCNC: 3.8 MMOL/L (ref 3.4–5.3)
PROT SERPL-MCNC: 7.4 G/DL (ref 6.4–8.3)
RBC # BLD AUTO: 5.29 10E6/UL (ref 4.4–5.9)
SODIUM SERPL-SCNC: 138 MMOL/L (ref 135–145)
SP GR UR STRIP: 1.02 (ref 1–1.03)
UROBILINOGEN UR STRIP-MCNC: NORMAL MG/DL
WBC # BLD AUTO: 10.3 10E3/UL (ref 4–11)

## 2024-04-21 PROCEDURE — 96360 HYDRATION IV INFUSION INIT: CPT | Performed by: FAMILY MEDICINE

## 2024-04-21 PROCEDURE — 83690 ASSAY OF LIPASE: CPT | Performed by: FAMILY MEDICINE

## 2024-04-21 PROCEDURE — 36415 COLL VENOUS BLD VENIPUNCTURE: CPT | Performed by: FAMILY MEDICINE

## 2024-04-21 PROCEDURE — 76705 ECHO EXAM OF ABDOMEN: CPT

## 2024-04-21 PROCEDURE — 81003 URINALYSIS AUTO W/O SCOPE: CPT | Performed by: FAMILY MEDICINE

## 2024-04-21 PROCEDURE — 258N000003 HC RX IP 258 OP 636: Performed by: FAMILY MEDICINE

## 2024-04-21 PROCEDURE — 83605 ASSAY OF LACTIC ACID: CPT | Performed by: FAMILY MEDICINE

## 2024-04-21 PROCEDURE — 96361 HYDRATE IV INFUSION ADD-ON: CPT | Performed by: FAMILY MEDICINE

## 2024-04-21 PROCEDURE — 99284 EMERGENCY DEPT VISIT MOD MDM: CPT | Performed by: FAMILY MEDICINE

## 2024-04-21 PROCEDURE — 85025 COMPLETE CBC W/AUTO DIFF WBC: CPT | Performed by: FAMILY MEDICINE

## 2024-04-21 PROCEDURE — 99284 EMERGENCY DEPT VISIT MOD MDM: CPT | Mod: 25 | Performed by: FAMILY MEDICINE

## 2024-04-21 PROCEDURE — 80053 COMPREHEN METABOLIC PANEL: CPT | Performed by: FAMILY MEDICINE

## 2024-04-21 RX ORDER — ONDANSETRON 2 MG/ML
4 INJECTION INTRAMUSCULAR; INTRAVENOUS ONCE
Status: COMPLETED | OUTPATIENT
Start: 2024-04-21 | End: 2024-04-21

## 2024-04-21 RX ORDER — HYDROMORPHONE HYDROCHLORIDE 1 MG/ML
0.5 INJECTION, SOLUTION INTRAMUSCULAR; INTRAVENOUS; SUBCUTANEOUS
Status: DISCONTINUED | OUTPATIENT
Start: 2024-04-21 | End: 2024-04-21 | Stop reason: HOSPADM

## 2024-04-21 RX ADMIN — SODIUM CHLORIDE 1000 ML: 9 INJECTION, SOLUTION INTRAVENOUS at 10:20

## 2024-04-21 ASSESSMENT — ACTIVITIES OF DAILY LIVING (ADL)
ADLS_ACUITY_SCORE: 37

## 2024-04-21 ASSESSMENT — COLUMBIA-SUICIDE SEVERITY RATING SCALE - C-SSRS
1. IN THE PAST MONTH, HAVE YOU WISHED YOU WERE DEAD OR WISHED YOU COULD GO TO SLEEP AND NOT WAKE UP?: NO
2. HAVE YOU ACTUALLY HAD ANY THOUGHTS OF KILLING YOURSELF IN THE PAST MONTH?: NO
6. HAVE YOU EVER DONE ANYTHING, STARTED TO DO ANYTHING, OR PREPARED TO DO ANYTHING TO END YOUR LIFE?: NO

## 2024-04-21 NOTE — ED TRIAGE NOTES
Right side abd pain started suddenlty at 0200 and woke pt from sleep. no nausea vomiting. Pt last bm today. Pt reports having had prostate out 7 weeks ago.     Triage Assessment (Adult)       Row Name 04/21/24 1003          Triage Assessment    Airway WDL WDL        Respiratory WDL    Respiratory WDL WDL        Skin Circulation/Temperature WDL    Skin Circulation/Temperature WDL WDL        Cardiac WDL    Cardiac WDL WDL        Peripheral/Neurovascular WDL    Peripheral Neurovascular WDL WDL        Cognitive/Neuro/Behavioral WDL    Cognitive/Neuro/Behavioral WDL WDL

## 2024-04-21 NOTE — DISCHARGE INSTRUCTIONS
You may use acetaminophen safely if you have any recurrent pain but if not improving please return to the emergency department.  You need to be very careful with your diet as we discussed avoiding high fat and high protein type foods as these will likely trigger an additional attack that has we discussed can be more serious than what you have experienced in the past.  I have placed an order for you for general surgery referral, they should contact you within the next couple of days and you can schedule a visit with them at your convenience to discuss elective laparoscopic cholecystectomy (removal of the gallbladder).  Return to the emergency department if worse or changes.

## 2024-04-21 NOTE — ED PROVIDER NOTES
"  History     Chief Complaint   Patient presents with    Abdominal Pain     Right side abd pain started suddenlty at 0200 and woke pt from sleep. no nausea vomiting. Pt last bm today. Pt reports having had prostate out 7 weeks ago.     HPI  Shon Sargent is a 63 year old male, past medical history significant for obesity, iron deficiency anemia, GERD, hypertension, intention tremor, gout, erectile dysfunction, psoriasis, prostate cancer status post prostatectomy approximately 7 weeks ago, now presents with concerns of sudden onset right sided abdominal pain beginning at 2:00 in the morning awakening him from sleep.  No nausea or vomiting.  History is obtained from the patient who states that he ate some chips and some grapes at around 7:00 PM last night and then awoke around 2:00 AM with severe sharp right upper quadrant abdominal pain.  He has self diagnosed himself with \"gallbladder attacks\" for about the last 20 years but has had no formal evaluation for this.  This feels similar although slightly different and he is concerned as it occurs in the context of being 7 weeks post da Vero robotic assisted prostatectomy.  He had a bowel movement shortly afterwards think it might help.  Kind of did.  The pain has not gone away.  He is taken nothing for the pain at home.  He reports no urinary tract symptoms.      Allergies:  Allergies   Allergen Reactions    Oxycodone Nausea and Vomiting    Valdecoxib Hives     Bextra       Problem List:    Patient Active Problem List    Diagnosis Date Noted    Post-operative state 02/29/2024     Priority: Medium    Class 2 severe obesity due to excess calories with serious comorbidity in adult (H) 02/13/2024     Priority: Medium    Iron deficiency anemia 03/18/2015     Priority: Medium    CARDIOVASCULAR SCREENING; LDL GOAL LESS THAN 130 03/13/2015     Priority: Medium    Esophageal reflux (GERD) 10/27/2014     Priority: Medium    Essential hypertension with goal blood pressure less " than 140/90 10/20/2014     Priority: Medium    Tremor 10/20/2014     Priority: Medium     10/20/2014:He has been on a beta-blocker for this-propranolol.  It helped blood pressure and tremor but made him tired.   Strong family history tremor.       Gout 10/20/2014     Priority: Medium     10/20/2014:HE has done well with allopurinol.       ED (erectile dysfunction) 10/20/2014     Priority: Medium    Psoriasis 10/20/2014     Priority: Medium        Past Medical History:    Past Medical History:   Diagnosis Date    ED (erectile dysfunction)     GERD (gastroesophageal reflux disease)     Gout     Hypertension     Prostate cancer (H)        Past Surgical History:    Past Surgical History:   Procedure Laterality Date    COLONOSCOPY N/A 10/08/2015    Procedure: COLONOSCOPY;  Surgeon: Rolando Ayers MD;  Location: [facility]    DAVINCI PROSTATECTOMY, LYMPHADENECTOMY N/A 2/29/2024    Procedure: ROBOT-ASSISTED LAPAROSCOPIC RADICAL PROSTATECTOMY WITH BILATERAL PELVIC LYMPHADENECTOMY;  Surgeon: Elizabeth Diop MD;  Location:  OR    ESOPHAGOSCOPY, GASTROSCOPY, DUODENOSCOPY (EGD), COMBINED N/A 11/03/2014    Procedure: COMBINED ESOPHAGOSCOPY, GASTROSCOPY, DUODENOSCOPY (EGD);  Surgeon: Rolando Ayers MD;  Location: [facility]    ESOPHAGOSCOPY, GASTROSCOPY, DUODENOSCOPY (EGD), COMBINED N/A 10/08/2015    Procedure: COMBINED ESOPHAGOSCOPY, GASTROSCOPY, DUODENOSCOPY (EGD), BIOPSY SINGLE OR MULTIPLE;  Surgeon: Rolando Ayers MD;  Location: [facility]    HERNIA REPAIR  01/01/2007    umbilical    TOTAL HIP ARTHROPLASTY Right 2021       Family History:    Family History   Problem Relation Age of Onset    Cancer Mother         lymphoma    Hypertension Father     Circulatory Father     Gastrointestinal Disease Father     Lipids Father     Alzheimer Disease Maternal Grandfather     Anesthesia Reaction Maternal Grandfather         memory issues    Prostate Cancer Maternal Grandfather     Cardiovascular Paternal Grandfather     Heart Disease Paternal  Grandfather     Asthma Son         allergy induced    Asthma Son         al;lergy induced    Coronary Artery Disease No family hx of     Colon Cancer No family hx of     Venous thrombosis No family hx of        Social History:  Marital Status:  Single [1]  Social History     Tobacco Use    Smoking status: Never    Smokeless tobacco: Never   Vaping Use    Vaping status: Never Used   Substance Use Topics    Alcohol use: Not Currently     Comment: occas    Drug use: No        Medications:    acetaminophen (TYLENOL) 500 MG tablet  allopurinol (ZYLOPRIM) 300 MG tablet  amLODIPine (NORVASC) 5 MG tablet  fish oil-omega-3 fatty acids 500 MG capsule  lisinopril (ZESTRIL) 30 MG tablet  multivitamin w/minerals (MULTI-VITAMIN) tablet  omeprazole (PRILOSEC) 20 MG DR capsule  Probiotic Product (FORTIFY DAILY PROBIOTIC PO)  sildenafil (REVATIO) 20 MG tablet  traMADol (ULTRAM) 50 MG tablet  VENTOLIN  (90 Base) MCG/ACT inhaler          Review of Systems   All other systems reviewed and are negative.      Physical Exam   BP: (!) 178/106  Pulse: 86  Temp: 98.2  F (36.8  C)  Resp: 18  Weight: 113.4 kg (250 lb)  SpO2: 96 %      Physical Exam  Vitals and nursing note reviewed.   Constitutional:       General: He is not in acute distress.     Appearance: He is well-developed and normal weight. He is not ill-appearing.   HENT:      Head: Normocephalic and atraumatic.      Mouth/Throat:      Mouth: Mucous membranes are moist.      Pharynx: Oropharynx is clear.   Eyes:      Extraocular Movements: Extraocular movements intact.      Pupils: Pupils are equal, round, and reactive to light.   Cardiovascular:      Rate and Rhythm: Normal rate and regular rhythm.      Heart sounds: Normal heart sounds.   Pulmonary:      Effort: Pulmonary effort is normal.      Breath sounds: Normal breath sounds.   Abdominal:      Comments: Marked tenderness right upper quadrant positive Dunaway's.  Less tender but  right lower quadrant again with  voluntary guarding as in the upper right quadrant.  No rebound no referred pain.     Skin:     General: Skin is warm and dry.      Capillary Refill: Capillary refill takes less than 2 seconds.   Neurological:      General: No focal deficit present.      Mental Status: He is alert.   Psychiatric:         Mood and Affect: Mood normal.         Behavior: Behavior normal.         ED Course        Procedures                Results for orders placed or performed during the hospital encounter of 04/21/24 (from the past 24 hour(s))   CBC with platelets, differential    Narrative    The following orders were created for panel order CBC with platelets, differential.  Procedure                               Abnormality         Status                     ---------                               -----------         ------                     CBC with platelets and d...[[id number]]                      Final result                 Please view results for these tests on the individual orders.   Comprehensive metabolic panel   Result Value Ref Range    Sodium 138 135 - 145 mmol/L    Potassium 3.8 3.4 - 5.3 mmol/L    Carbon Dioxide (CO2) 21 (L) 22 - 29 mmol/L    Anion Gap 14 7 - 15 mmol/L    Urea Nitrogen 12.4 8.0 - 23.0 mg/dL    Creatinine 0.89 0.67 - 1.17 mg/dL    GFR Estimate >90 >60 mL/min/1.73m2    Calcium 9.3 8.8 - 10.2 mg/dL    Chloride 103 98 - 107 mmol/L    Glucose 122 (H) 70 - 99 mg/dL    Alkaline Phosphatase 114 40 - 150 U/L    AST 24 0 - 45 U/L    ALT 23 0 - 70 U/L    Protein Total 7.4 6.4 - 8.3 g/dL    Albumin 4.4 3.5 - 5.2 g/dL    Bilirubin Total 0.5 <=1.2 mg/dL   Lipase   Result Value Ref Range    Lipase 24 13 - 60 U/L   Lactic acid whole blood   Result Value Ref Range    Lactic Acid 1.5 0.7 - 2.0 mmol/L   CBC with platelets and differential   Result Value Ref Range    WBC Count 10.3 4.0 - 11.0 10e3/uL    RBC Count 5.29 4.40 - 5.90 10e6/uL    Hemoglobin 14.8 13.3 - 17.7 g/dL    Hematocrit 45.0 40.0 - 53.0 %    MCV 85 78  - 100 fL    MCH 28.0 26.5 - 33.0 pg    MCHC 32.9 31.5 - 36.5 g/dL    RDW 13.2 10.0 - 15.0 %    Platelet Count 222 150 - 450 10e3/uL    % Neutrophils 76 %    % Lymphocytes 16 %    % Monocytes 6 %    % Eosinophils 1 %    % Basophils 1 %    % Immature Granulocytes 0 %    NRBCs per 100 WBC 0 <1 /100    Absolute Neutrophils 7.8 1.6 - 8.3 10e3/uL    Absolute Lymphocytes 1.6 0.8 - 5.3 10e3/uL    Absolute Monocytes 0.6 0.0 - 1.3 10e3/uL    Absolute Eosinophils 0.1 0.0 - 0.7 10e3/uL    Absolute Basophils 0.1 0.0 - 0.2 10e3/uL    Absolute Immature Granulocytes 0.0 <=0.4 10e3/uL    Absolute NRBCs 0.0 10e3/uL   US Abdomen Limited    Narrative    EXAM: US ABDOMEN LIMITED  LOCATION: [facility]  DATE: 4/21/2024    INDICATION: Right upper quadrant abdominal pain.  COMPARISON: None.  TECHNIQUE: Limited abdominal ultrasound.    FINDINGS:    GALLBLADDER: The gallbladder is distended and contains biliary sludge. There is diffuse gallbladder wall thickening measuring up to 7 mm in thickness. A sonographic Dunaway sign is reported as positive (patient was focally tender to palpation of the   gallbladder with the ultrasound probe). No pericholecystic fluid.    BILE DUCTS: No biliary dilatation. The common duct measures 4 mm.    LIVER: The liver is enlarged measuring up to 20.7 cm in length. Markedly increased echogenicity of the hepatic parenchyma suggestive of hepatic steatosis. No focal liver lesion identified within the limitations of abnormal echogenicity.    RIGHT KIDNEY: No hydronephrosis.    PANCREAS: The pancreas is obscured by overlying gas.    No ascites.      Impression    IMPRESSION:  1.  Distended gallbladder with wall thickening and positive sonographic Dunaway sign. Findings are concerning for acute cholecystitis.   2.  Hepatomegaly with hepatic steatosis.       UA Macroscopic with reflex to Microscopic and Culture    Specimen: Urine, Clean Catch   Result Value Ref Range    Color Urine Yellow  Colorless, Straw, Light Yellow, Yellow    Appearance Urine Clear Clear    Glucose Urine Negative Negative mg/dL    Bilirubin Urine Negative Negative    Ketones Urine Negative Negative mg/dL    Specific Gravity Urine 1.017 1.003 - 1.035    Blood Urine Negative Negative    pH Urine 5.0 5.0 - 7.0    Protein Albumin Urine Negative Negative mg/dL    Urobilinogen Urine Normal Normal, 2.0 mg/dL    Nitrite Urine Negative Negative    Leukocyte Esterase Urine Negative Negative    Narrative    Microscopic not indicated       Medications   HYDROmorphone (PF) (DILAUDID) injection 0.5 mg (has no administration in time range)   sodium chloride 0.9% BOLUS 1,000 mL (1,000 mLs Intravenous $New Bag 4/21/24 1020)   ondansetron (ZOFRAN) injection 4 mg (4 mg Intravenous Not Given 4/21/24 1020)     12:28 PM  I reviewed this patient with on-call general surgeon Dr. Shelton Carlson.  He felt that given the patient's improvement and near resolution of pain in the context of the above evaluation that he could be dispositioned to home with plans for consultation in clinic with general surgery regarding laparoscopic cholecystectomy.  I reviewed this plan with the patient I also discussed in detail appropriate diet to avoid precipitating further attacks until he can have his gallbladder removed.  He verbalized understanding disposition is to home.  Return criteria discussed.      Assessments & Plan (with Medical Decision Making)   Assessments and plan with medical decision making at the time stamp above.    Disclaimer: This note consists of symbols derived from keyboarding, dictation and/or voice recognition software. As a result, there may be errors in the script that have gone undetected. Please consider this when interpreting information found in this chart.        I have reviewed the nursing notes.    I have reviewed the findings, diagnosis, plan and need for follow up with the patient.        New Prescriptions    No medications on file        Final diagnoses:   Biliary colic       4/21/2024   [facility] EMERGENCY DEPT       Thee Jang MD  04/21/24 [id number]

## 2024-05-14 ENCOUNTER — OFFICE VISIT (OUTPATIENT)
Dept: SURGERY | Facility: CLINIC | Age: 64
End: 2024-05-14
Attending: FAMILY MEDICINE
Payer: COMMERCIAL

## 2024-05-14 ENCOUNTER — TELEPHONE (OUTPATIENT)
Dept: SURGERY | Facility: CLINIC | Age: 64
End: 2024-05-14

## 2024-05-14 VITALS
BODY MASS INDEX: 35.31 KG/M2 | DIASTOLIC BLOOD PRESSURE: 80 MMHG | HEART RATE: 91 BPM | WEIGHT: 252.2 LBS | HEIGHT: 71 IN | SYSTOLIC BLOOD PRESSURE: 124 MMHG | TEMPERATURE: 97.9 F

## 2024-05-14 DIAGNOSIS — K80.50 BILIARY COLIC: ICD-10-CM

## 2024-05-14 PROCEDURE — 99204 OFFICE O/P NEW MOD 45 MIN: CPT | Performed by: SURGERY

## 2024-05-14 ASSESSMENT — PAIN SCALES - GENERAL: PAINLEVEL: NO PAIN (0)

## 2024-05-14 NOTE — PROGRESS NOTES
Assessment and Plan:    Shon Sargent is a 63 year old male seen in consultation for Abdominal pain, right upper quadrant at the request of [facility].    It is my impression that Shon has symptomatic gallstones.   I have offered him a laparoscopic cholecystectomy.      Patient desires surgery at convenient time for him. Advised each episode can become worse.  Advised of signs/symptoms to return.    We have discussed the indication, alternatives, risks and expected recovery.  Specifically we have discussed incisions, scarring, postoperative infections, anesthesia, bleeding, blood transfusion, open conversion, common bile duct injury, injury to intra-abdominal organs, adhesions that can lead to bowel obstruction, retained common bile duct stone, bile leak, DVT, PE, hernia, post cholecystectomy diarrhea, postoperative dietary restrictions and physical limitations.  We have discussed the recommended interventions and treatments for these complications.  All questions have been answered to the best of my ability.         Comorbidities:  Obesity, HTN    We will schedule surgery at the patient's convenience.      Chief complaint:  Abdominal pain, right upper quadrant    HPI:  Shon Sargent is a 63 year old male who presents with intermittent right upper quadrant pain for several years.  3 weeks prior, he was in the ED.  That episode resolved in 2 days. The pain is associated with eating fatty foods.  Positive for associated symptoms of anorexia, nausea, and bloating.  Negative for associated symptoms of fevers or chills.  He does not have a history of jaundice or dark urine.  He  has not had pancreatitis in the past.        Past Medical History:   has a past medical history of ED (erectile dysfunction), GERD (gastroesophageal reflux disease), Gout, Hypertension, and Prostate cancer (H).    Past Surgical History:  Past Surgical History:   Procedure Laterality Date    COLONOSCOPY N/A  10/08/2015    Procedure: COLONOSCOPY;  Surgeon: Rolando Ayers MD;  Location: [facility]    DAVINCI PROSTATECTOMY, LYMPHADENECTOMY N/A 2/29/2024    Procedure: ROBOT-ASSISTED LAPAROSCOPIC RADICAL PROSTATECTOMY WITH BILATERAL PELVIC LYMPHADENECTOMY;  Surgeon: Elizabeth Diop MD;  Location:  OR    ESOPHAGOSCOPY, GASTROSCOPY, DUODENOSCOPY (EGD), COMBINED N/A 11/03/2014    Procedure: COMBINED ESOPHAGOSCOPY, GASTROSCOPY, DUODENOSCOPY (EGD);  Surgeon: Rolando Ayers MD;  Location: [facility]    ESOPHAGOSCOPY, GASTROSCOPY, DUODENOSCOPY (EGD), COMBINED N/A 10/08/2015    Procedure: COMBINED ESOPHAGOSCOPY, GASTROSCOPY, DUODENOSCOPY (EGD), BIOPSY SINGLE OR MULTIPLE;  Surgeon: Rolando Ayers MD;  Location: [facility]    HERNIA REPAIR  01/01/2007    umbilical    TOTAL HIP ARTHROPLASTY Right 2021       Social History:  Social History     Socioeconomic History    Marital status: Single     Spouse name: Not on file    Number of children: Not on file    Years of education: Not on file    Highest education level: Not on file   Occupational History    Not on file   Tobacco Use    Smoking status: Never    Smokeless tobacco: Never   Vaping Use    Vaping status: Never Used   Substance and Sexual Activity    Alcohol use: Not Currently     Comment: occas    Drug use: No    Sexual activity: Yes     Partners: Female   Other Topics Concern    Parent/sibling w/ CABG, MI or angioplasty before 65F 55M? No   Social History Narrative    Not on file     Social Determinants of Health     Financial Resource Strain: Low Risk  (12/7/2020)    Received from [facility]    Overall Financial Resource Strain (CARDIA)     Difficulty of Paying Living Expenses: Not very hard   Food Insecurity: No Food Insecurity (1/30/2024)    Received from [facility]    Hunger Vital Sign     Worried About Running Out of Food in the Last Year: Never true     Ran Out of Food in the Last Year: Never true   Transportation Needs: No Transportation Needs (1/30/2024)     Received from [facility]    PRAPARE - Transportation     Lack of Transportation (Medical): No     Lack of Transportation (Non-Medical): No   Physical Activity: Insufficiently Active (1/30/2024)    Received from [facility]    Exercise Vital Sign     Days of Exercise per Week: 3 days     Minutes of Exercise per Session: 30 min   Stress: No Stress Concern Present (4/22/2021)    Received from [facility]    [place] [employer] - Occupational Stress Questionnaire     Feeling of Stress : Only a little   Social Connections: Unknown (4/22/2021)    Received from [facility]    Social Connection and Isolation Panel [NHANES]     Frequency of Communication with Friends and Family: More than three times a week     Frequency of Social Gatherings with Friends and Family: More than three times a week     Attends Voodoo Services: Not on file     Active Member of Clubs or Organizations: No     Attends Club or Organization Meetings: Not on file     Marital Status: Living with partner   Interpersonal Safety: Not on file   Housing Stability: Low Risk  (1/30/2024)    Received from [facility]    Housing Stability     What is your living situation today?: I have a steady place to live        Family History:  Family History   Problem Relation Age of Onset    Cancer Mother         lymphoma    Hypertension Father     Circulatory Father     Gastrointestinal Disease Father     Lipids Father     Alzheimer Disease Maternal Grandfather     Anesthesia Reaction Maternal Grandfather         memory issues    Prostate Cancer Maternal Grandfather     Cardiovascular Paternal Grandfather     Heart Disease Paternal Grandfather     Asthma Son         allergy induced    Asthma Son         al;lergy induced    Coronary Artery Disease No family hx of     Colon Cancer No family hx of     Venous thrombosis No family hx of      Family history reviewed and is not  "pertinent    Review of Systems:  The 10 point review of systems is negative other than noted in the HPI and above.    Physical Exam:  Vitals: /80 (BP Location: Right arm, Patient Position: Sitting, Cuff Size: Adult Large)   Pulse 91   Temp 97.9  F (36.6  C) (Tympanic)   Ht 1.803 m (5' 10.98\")   Wt 114.4 kg (252 lb 3.2 oz)   BMI 35.19 kg/m    BMI= Body mass index is 35.19 kg/m .  General - Well developed, well nourished male in no apparent distress  HEENT:  Head normocephalic and atraumatic, pupils equal and round, conjunctivae clear, no scleral icterus, mucous membranes moist, external ears and nose normal  Neck: Supple without thyromegaly or masses  Lymphatic: No cervical, or supraclavicular lymphadenopathy  Pulmonary: Clear to auscultation bilaterally  Abdomen:   soft, obese, non-distended with no tenderness noted Dunaway's sign is absent. no masses palpated.  Extremities: Warm without edema  Musculoskeletal:  Normal station and gait  Neurologic: alert, speech is clear, moves all extremities with good strength  Psychiatric: Mood and affect appropriate  Skin: Without lesions, rashes or juandice.  Well healed prostatectomy incisions    Relevant labs:    WBC -   Lab Results   Component Value Date    WBC 10.3 04/21/2024       HgB -   Lab Results   Component Value Date    HGB 14.8 04/21/2024       Plt-   Lab Results   Component Value Date     04/21/2024       Liver Function Studies -   Recent Labs   Lab Test 04/21/24  1006   PROTTOTAL 7.4   ALBUMIN 4.4   BILITOTAL 0.5   ALKPHOS 114   AST 24   ALT 23       Lipase-   Lab Results   Component Value Date    LIPASE 24 04/21/2024           Imaging:  All imaging studies reviewed by me.    Ultrasound RUQ: positive cholelithiasis, positive gallbladder wall thickening, positive ductal dilatation, positive pericholecystic fluid, positive sonographic Dunaway's sign.    Recent Results (from the past 744 hour(s))   US Abdomen Limited    Narrative    EXAM: US ABDOMEN " LIMITED  LOCATION: [facility]  DATE: 4/21/2024    INDICATION: Right upper quadrant abdominal pain.  COMPARISON: None.  TECHNIQUE: Limited abdominal ultrasound.    FINDINGS:    GALLBLADDER: The gallbladder is distended and contains biliary sludge. There is diffuse gallbladder wall thickening measuring up to 7 mm in thickness. A sonographic Dunaway sign is reported as positive (patient was focally tender to palpation of the   gallbladder with the ultrasound probe). No pericholecystic fluid.    BILE DUCTS: No biliary dilatation. The common duct measures 4 mm.    LIVER: The liver is enlarged measuring up to 20.7 cm in length. Markedly increased echogenicity of the hepatic parenchyma suggestive of hepatic steatosis. No focal liver lesion identified within the limitations of abnormal echogenicity.    RIGHT KIDNEY: No hydronephrosis.    PANCREAS: The pancreas is obscured by overlying gas.    No ascites.      Impression    IMPRESSION:  1.  Distended gallbladder with wall thickening and positive sonographic Dunaway sign. Findings are concerning for acute cholecystitis.   2.  Hepatomegaly with hepatic steatosis.             This note was created using voice recognition software. Undetected word substitutions or other errors may have occurred.     Time spent with the patient with greater that 50% of the time in discussion was 40 minutes.     Loc Kramer, DO  [facility] [place]    Please route or send letter to:  Primary Care Provider (PCP)

## 2024-05-14 NOTE — LETTER
5/14/2024         RE: Shon Sargent  [address]  [facility] [address]        Dear Colleague,    Thank you for referring your patient, Shon Sargent, to the [facility]. Please see a copy of my visit note below.    Assessment and Plan:    Shon Sargent is a 63 year old male seen in consultation for Abdominal pain, right upper quadrant at the request of [facility].    It is my impression that Shon has symptomatic gallstones.   I have offered him a laparoscopic cholecystectomy.      Patient desires surgery at convenient time for him. Advised each episode can become worse.  Advised of signs/symptoms to return.    We have discussed the indication, alternatives, risks and expected recovery.  Specifically we have discussed incisions, scarring, postoperative infections, anesthesia, bleeding, blood transfusion, open conversion, common bile duct injury, injury to intra-abdominal organs, adhesions that can lead to bowel obstruction, retained common bile duct stone, bile leak, DVT, PE, hernia, post cholecystectomy diarrhea, postoperative dietary restrictions and physical limitations.  We have discussed the recommended interventions and treatments for these complications.  All questions have been answered to the best of my ability.         Comorbidities:  Obesity, HTN    We will schedule surgery at the patient's convenience.      Chief complaint:  Abdominal pain, right upper quadrant    HPI:  Shon Sargent is a 63 year old male who presents with intermittent right upper quadrant pain for several years.  3 weeks prior, he was in the ED.  That episode resolved in 2 days. The pain is associated with eating fatty foods.  Positive for associated symptoms of anorexia, nausea, and bloating.  Negative for associated symptoms of fevers or chills.  He does not have a history of jaundice or dark urine.  He  has not had pancreatitis in the past.        Past Medical History:   has a  past medical history of ED (erectile dysfunction), GERD (gastroesophageal reflux disease), Gout, Hypertension, and Prostate cancer (H).    Past Surgical History:  Past Surgical History:   Procedure Laterality Date     COLONOSCOPY N/A 10/08/2015    Procedure: COLONOSCOPY;  Surgeon: Rolando Ayers MD;  Location: [facility]     DAVINCI PROSTATECTOMY, LYMPHADENECTOMY N/A 2/29/2024    Procedure: ROBOT-ASSISTED LAPAROSCOPIC RADICAL PROSTATECTOMY WITH BILATERAL PELVIC LYMPHADENECTOMY;  Surgeon: Elizabeth Diop MD;  Location:  OR     ESOPHAGOSCOPY, GASTROSCOPY, DUODENOSCOPY (EGD), COMBINED N/A 11/03/2014    Procedure: COMBINED ESOPHAGOSCOPY, GASTROSCOPY, DUODENOSCOPY (EGD);  Surgeon: Rolando Ayers MD;  Location: [facility]     ESOPHAGOSCOPY, GASTROSCOPY, DUODENOSCOPY (EGD), COMBINED N/A 10/08/2015    Procedure: COMBINED ESOPHAGOSCOPY, GASTROSCOPY, DUODENOSCOPY (EGD), BIOPSY SINGLE OR MULTIPLE;  Surgeon: Rolando Ayers MD;  Location: [facility]     HERNIA REPAIR  01/01/2007    umbilical     TOTAL HIP ARTHROPLASTY Right 2021       Social History:  Social History     Socioeconomic History     Marital status: Single     Spouse name: Not on file     Number of children: Not on file     Years of education: Not on file     Highest education level: Not on file   Occupational History     Not on file   Tobacco Use     Smoking status: Never     Smokeless tobacco: Never   Vaping Use     Vaping status: Never Used   Substance and Sexual Activity     Alcohol use: Not Currently     Comment: occas     Drug use: No     Sexual activity: Yes     Partners: Female   Other Topics Concern     Parent/sibling w/ CABG, MI or angioplasty before 65F 55M? No   Social History Narrative     Not on file     Social Determinants of Health     Financial Resource Strain: Low Risk  (12/7/2020)    [facility]    Overall Financial Resource Strain (CARDIA)      Difficulty of Paying Living Expenses: Not very hard   Food Insecurity: No Food Insecurity  (1/30/2024)    Received from [facility]    Hunger Vital Sign      Worried About Running Out of Food in the Last Year: Never true      Ran Out of Food in the Last Year: Never true   Transportation Needs: No Transportation Needs (1/30/2024)    Received from [facility]    PRAPARE - Transportation      Lack of Transportation (Medical): No      Lack of Transportation (Non-Medical): No   Physical Activity: Insufficiently Active (1/30/2024)    Received from [facility]    Exercise Vital Sign      Days of Exercise per Week: 3 days      Minutes of Exercise per Session: 30 min   Stress: No Stress Concern Present (4/22/2021)    Received from [facility]    [place] [employer] - Occupational Stress Questionnaire      Feeling of Stress : Only a little   Social Connections: Unknown (4/22/2021)    Received from [facility]    Social Connection and Isolation Panel [NHANES]      Frequency of Communication with Friends and Family: More than three times a week      Frequency of Social Gatherings with Friends and Family: More than three times a week      Attends Christianity Services: Not on file      Active Member of Clubs or Organizations: No      Attends Club or Organization Meetings: Not on file      Marital Status: Living with partner   Interpersonal Safety: Not on file   Housing Stability: Low Risk  (1/30/2024)    Received from [facility]    Housing Stability      What is your living situation today?: I have a steady place to live        Family History:  Family History   Problem Relation Age of Onset     Cancer Mother         lymphoma     Hypertension Father      Circulatory Father      Gastrointestinal Disease Father      Lipids Father      Alzheimer Disease Maternal Grandfather      Anesthesia Reaction Maternal Grandfather         memory issues     Prostate Cancer Maternal Grandfather      Cardiovascular Paternal Grandfather       "Heart Disease Paternal Grandfather      Asthma Son         allergy induced     Asthma Son         al;lergy induced     Coronary Artery Disease No family hx of      Colon Cancer No family hx of      Venous thrombosis No family hx of      Family history reviewed and is not pertinent    Review of Systems:  The 10 point review of systems is negative other than noted in the HPI and above.    Physical Exam:  Vitals: /80 (BP Location: Right arm, Patient Position: Sitting, Cuff Size: Adult Large)   Pulse 91   Temp 97.9  F (36.6  C) (Tympanic)   Ht 1.803 m (5' 10.98\")   Wt 114.4 kg (252 lb 3.2 oz)   BMI 35.19 kg/m    BMI= Body mass index is 35.19 kg/m .  General - Well developed, well nourished male in no apparent distress  HEENT:  Head normocephalic and atraumatic, pupils equal and round, conjunctivae clear, no scleral icterus, mucous membranes moist, external ears and nose normal  Neck: Supple without thyromegaly or masses  Lymphatic: No cervical, or supraclavicular lymphadenopathy  Pulmonary: Clear to auscultation bilaterally  Abdomen:   soft, obese, non-distended with no tenderness noted Dunaway's sign is absent. no masses palpated.  Extremities: Warm without edema  Musculoskeletal:  Normal station and gait  Neurologic: alert, speech is clear, moves all extremities with good strength  Psychiatric: Mood and affect appropriate  Skin: Without lesions, rashes or juandice.  Well healed prostatectomy incisions    Relevant labs:    WBC -   Lab Results   Component Value Date    WBC 10.3 04/21/2024       HgB -   Lab Results   Component Value Date    HGB 14.8 04/21/2024       Plt-   Lab Results   Component Value Date     04/21/2024       Liver Function Studies -   Recent Labs   Lab Test 04/21/24  1006   PROTTOTAL 7.4   ALBUMIN 4.4   BILITOTAL 0.5   ALKPHOS 114   AST 24   ALT 23       Lipase-   Lab Results   Component Value Date    LIPASE 24 04/21/2024           Imaging:  All imaging studies reviewed by " me.    Ultrasound RUQ: positive cholelithiasis, positive gallbladder wall thickening, positive ductal dilatation, positive pericholecystic fluid, positive sonographic Dunaway's sign.    Recent Results (from the past 744 hour(s))   US Abdomen Limited    Narrative    EXAM: US ABDOMEN LIMITED  LOCATION: [facility]  DATE: 4/21/2024    INDICATION: Right upper quadrant abdominal pain.  COMPARISON: None.  TECHNIQUE: Limited abdominal ultrasound.    FINDINGS:    GALLBLADDER: The gallbladder is distended and contains biliary sludge. There is diffuse gallbladder wall thickening measuring up to 7 mm in thickness. A sonographic Dunaway sign is reported as positive (patient was focally tender to palpation of the   gallbladder with the ultrasound probe). No pericholecystic fluid.    BILE DUCTS: No biliary dilatation. The common duct measures 4 mm.    LIVER: The liver is enlarged measuring up to 20.7 cm in length. Markedly increased echogenicity of the hepatic parenchyma suggestive of hepatic steatosis. No focal liver lesion identified within the limitations of abnormal echogenicity.    RIGHT KIDNEY: No hydronephrosis.    PANCREAS: The pancreas is obscured by overlying gas.    No ascites.      Impression    IMPRESSION:  1.  Distended gallbladder with wall thickening and positive sonographic Dunaway sign. Findings are concerning for acute cholecystitis.   2.  Hepatomegaly with hepatic steatosis.             This note was created using voice recognition software. Undetected word substitutions or other errors may have occurred.     Time spent with the patient with greater that 50% of the time in discussion was 40 minutes.     Loc Kramer DO  [facility] [place]    Please route or send letter to:  Primary Care Provider (PCP)      Again, thank you for allowing me to participate in the care of your patient.        Sincerely,        Loc Kramer DO

## 2024-05-14 NOTE — NURSING NOTE
"Initial /80 (BP Location: Right arm, Patient Position: Sitting, Cuff Size: Adult Large)   Pulse 91   Temp 97.9  F (36.6  C) (Tympanic)   Ht 1.803 m (5' 10.98\")   Wt 114.4 kg (252 lb 3.2 oz)   BMI 35.19 kg/m   Estimated body mass index is 35.19 kg/m  as calculated from the following:    Height as of this encounter: 1.803 m (5' 10.98\").    Weight as of this encounter: 114.4 kg (252 lb 3.2 oz). .  Caridad Bertrand MA    "

## 2024-05-21 NOTE — TELEPHONE ENCOUNTER
Spoke with patient discussed surgery dates, patient will discuss surgery with his prostate DrNirmala And call us back to schedule surgery.

## 2024-05-23 NOTE — TELEPHONE ENCOUNTER
Type of surgery: CHOLECYSTECTOMY, LAPAROSCOPIC   Location of surgery: [place] [facility]  Date and time of surgery: 06/14/2024  Surgeon: YOLY  Pre-Op Appt Date: 06/10/2024  Post-Op Appt Date: 07/02/2024   Packet sent out: Yes  Pre-cert/Authorization completed:  No  Date:

## 2024-06-04 ENCOUNTER — LAB (OUTPATIENT)
Dept: LAB | Facility: CLINIC | Age: 64
End: 2024-06-04
Payer: COMMERCIAL

## 2024-06-04 DIAGNOSIS — C61 PROSTATE CANCER (H): ICD-10-CM

## 2024-06-04 LAB — PSA SERPL DL<=0.01 NG/ML-MCNC: <0.01 NG/ML (ref 0–4.5)

## 2024-06-04 PROCEDURE — 84153 ASSAY OF PSA TOTAL: CPT

## 2024-06-04 PROCEDURE — 36415 COLL VENOUS BLD VENIPUNCTURE: CPT

## 2024-06-07 ENCOUNTER — OFFICE VISIT (OUTPATIENT)
Dept: ONCOLOGY | Facility: CLINIC | Age: 64
End: 2024-06-07
Attending: UROLOGY
Payer: COMMERCIAL

## 2024-06-07 VITALS
TEMPERATURE: 97.9 F | HEART RATE: 75 BPM | DIASTOLIC BLOOD PRESSURE: 92 MMHG | BODY MASS INDEX: 34.86 KG/M2 | OXYGEN SATURATION: 95 % | SYSTOLIC BLOOD PRESSURE: 131 MMHG | RESPIRATION RATE: 16 BRPM | HEIGHT: 71 IN | WEIGHT: 249 LBS

## 2024-06-07 DIAGNOSIS — C61 PROSTATE CANCER (H): Primary | ICD-10-CM

## 2024-06-07 PROCEDURE — G0463 HOSPITAL OUTPT CLINIC VISIT: HCPCS | Performed by: UROLOGY

## 2024-06-07 PROCEDURE — 99214 OFFICE O/P EST MOD 30 MIN: CPT | Performed by: UROLOGY

## 2024-06-07 ASSESSMENT — PAIN SCALES - GENERAL: PAINLEVEL: NO PAIN (0)

## 2024-06-07 NOTE — LETTER
"6/7/2024      Shon Sargent  [address]  [facility] [address]      Dear Colleague,    Thank you for referring your patient, Shon Sargent, to the [facility]. Please see a copy of my visit note below.    Urology Clinic     HPI  Shon Sargent is a 63 year old male with history of prostate cancer sp RALP, here for follow-up .      The patient denies any significant urinary issue for here.  His bladder is very sensitive to any caffeinated beverages and he gets the urgency right away he is still not having much success with erections.    He has been having more recurring gallbladder related abdominal pain and is scheduled for a laparoscopic cholecystectomy the following Friday.  He was concerned that this could be related to his prostate cancer and potentially spread from prostate cancer to the gallbladder.      PHYSICAL EXAM  BP (!) 131/92   Pulse 75   Temp 97.9  F (36.6  C) (Oral)   Resp 16   Ht 1.803 m (5' 10.98\")   Wt 112.9 kg (249 lb)   SpO2 95%   BMI 34.74 kg/m     Constitutional: AO, pleasant, NAD  Resp: Non-labored breathing on room air  Abd: Soft, NT, ND  MARIAH: Deferred   Labs  Lab Results   Component Value Date    WBC 10.3 04/21/2024    WBC 7.8 09/02/2019     Lab Results   Component Value Date    RBC 5.29 04/21/2024    RBC 4.76 09/02/2019     Lab Results   Component Value Date    HGB 14.8 04/21/2024    HGB 13.8 09/02/2019     Lab Results   Component Value Date    HCT 45.0 04/21/2024    HCT 42.8 09/02/2019     No components found for: \"MCT\"  Lab Results   Component Value Date    MCV 85 04/21/2024    MCV 90 09/02/2019     Lab Results   Component Value Date    MCH 28.0 04/21/2024    MCH 29.0 09/02/2019     Lab Results   Component Value Date    MCHC 32.9 04/21/2024    MCHC 32.2 09/02/2019     Lab Results   Component Value Date    RDW 13.2 04/21/2024    RDW 12.8 09/02/2019     Lab Results   Component Value Date     04/21/2024     09/02/2019        Last " Comprehensive Metabolic Panel:  Sodium   Date Value Ref Range Status   04/21/2024 138 135 - 145 mmol/L Final     Comment:     Reference intervals for this test were updated on 09/26/2023 to more accurately reflect our healthy population. There may be differences in the flagging of prior results with similar values performed with this method. Interpretation of those prior results can be made in the context of the updated reference intervals.    09/02/2019 142 133 - 144 mmol/L Final     Potassium   Date Value Ref Range Status   04/21/2024 3.8 3.4 - 5.3 mmol/L Final   06/20/2022 3.9 3.4 - 5.3 mmol/L Final   09/02/2019 4.5 3.4 - 5.3 mmol/L Final     Chloride   Date Value Ref Range Status   04/21/2024 103 98 - 107 mmol/L Final   06/20/2022 108 94 - 109 mmol/L Final   09/02/2019 112 (H) 94 - 109 mmol/L Final     Carbon Dioxide   Date Value Ref Range Status   09/02/2019 23 20 - 32 mmol/L Final     Carbon Dioxide (CO2)   Date Value Ref Range Status   04/21/2024 21 (L) 22 - 29 mmol/L Final   06/20/2022 24 20 - 32 mmol/L Final     Anion Gap   Date Value Ref Range Status   04/21/2024 14 7 - 15 mmol/L Final   06/20/2022 6 3 - 14 mmol/L Final   09/02/2019 7 3 - 14 mmol/L Final     Glucose   Date Value Ref Range Status   04/21/2024 122 (H) 70 - 99 mg/dL Final   06/20/2022 96 70 - 99 mg/dL Final   09/02/2019 109 (H) 70 - 99 mg/dL Final     Urea Nitrogen   Date Value Ref Range Status   04/21/2024 12.4 8.0 - 23.0 mg/dL Final   06/20/2022 16 7 - 30 mg/dL Final   09/02/2019 33 (H) 7 - 30 mg/dL Final     Creatinine   Date Value Ref Range Status   04/21/2024 0.89 0.67 - 1.17 mg/dL Final   09/02/2019 0.85 0.66 - 1.25 mg/dL Final     GFR Estimate   Date Value Ref Range Status   04/21/2024 >90 >60 mL/min/1.73m2 Final   09/02/2019 >90 >60 mL/min/[1.73_m2] Final     Comment:     Non  GFR Calc  Starting 12/18/2018, serum creatinine based estimated GFR (eGFR) will be   calculated using the Chronic Kidney Disease Epidemiology  Collaboration   (CKD-EPI) equation.       Calcium   Date Value Ref Range Status   04/21/2024 9.3 8.8 - 10.2 mg/dL Final   09/02/2019 7.9 (L) 8.5 - 10.1 mg/dL Final     Bilirubin Total   Date Value Ref Range Status   04/21/2024 0.5 <=1.2 mg/dL Final   09/02/2019 0.5 0.2 - 1.3 mg/dL Final     Alkaline Phosphatase   Date Value Ref Range Status   04/21/2024 114 40 - 150 U/L Final     Comment:     Reference intervals for this test were updated on 11/14/2023 to more accurately reflect our healthy population. There may be differences in the flagging of prior results with similar values performed with this method. Interpretation of those prior results can be made in the context of the updated reference intervals.   09/02/2019 76 40 - 150 U/L Final     ALT   Date Value Ref Range Status   04/21/2024 23 0 - 70 U/L Final     Comment:     Reference intervals for this test were updated on 6/12/2023 to more accurately reflect our healthy population. There may be differences in the flagging of prior results with similar values performed with this method. Interpretation of those prior results can be made in the context of the updated reference intervals.     09/02/2019 25 0 - 70 U/L Final     AST   Date Value Ref Range Status   04/21/2024 24 0 - 45 U/L Final     Comment:     Reference intervals for this test were updated on 6/12/2023 to more accurately reflect our healthy population. There may be differences in the flagging of prior results with similar values performed with this method. Interpretation of those prior results can be made in the context of the updated reference intervals.   09/02/2019 16 0 - 45 U/L Final       PSA   Date Value Ref Range Status   12/27/2019 4.76 (H) 0 - 4 ug/L Final     Comment:     Assay Method:  Chemiluminescence using Siemens Vista analyzer     Prostate Specific Antigen Screen   Date Value Ref Range Status   06/29/2023 5.11 (H) 0.00 - 4.50 ng/mL Final   06/20/2022 4.58 (H) 0.00 - 4.00 ug/L Final      PSA Tumor Marker   Date Value Ref Range Status   06/04/2024 <0.01 0.00 - 4.50 ng/mL Final   10/10/2023 5.27 (H) 0.00 - 4.50 ng/mL Final          ASSESSMENT AND PLAN  63 year old male for followup of pT3a prostatic adenocarcinoma status post robotic radical prostatectomy on 3/6/2024 doing well ELLIOTT      I assured him that the risk of spreading his prostate cancer to gallbladder is extremely small.  His PSA is undetectable so there is no evidence of any cancer recurrence at this point.  I reassured him that this is merely an unfortunate coincidence.    Plan   Follow-up in 4 months with PSA prior    30 total minutes spent on the date of the encounter including direct interaction with the patient, performing chart review, documentation and further activities as noted above    Elizabeth Diop MD   Department of Urology   [facility]                   Again, thank you for allowing me to participate in the care of your patient.        Sincerely,        Elizabeth Diop MD

## 2024-06-07 NOTE — NURSING NOTE
"Oncology Rooming Note    June 7, 2024 11:12 AM   Shon Sargent is a 63 year old male who presents for:    Chief Complaint   Patient presents with    Oncology Clinic Visit     Prostate cancer     Initial Vitals: Ht 1.803 m (5' 10.98\")   Wt 112.9 kg (249 lb)   BMI 34.74 kg/m   Estimated body mass index is 34.74 kg/m  as calculated from the following:    Height as of this encounter: 1.803 m (5' 10.98\").    Weight as of this encounter: 112.9 kg (249 lb). Body surface area is 2.38 meters squared.  No Pain (0) Comment: Data Unavailable   No LMP for male patient.  Allergies reviewed: Yes  Medications reviewed: Yes    Medications: Medication refills not needed today.  Pharmacy name entered into [employer]: [facility] - [place], [place] - [facility] [address]     Frailty Screening:   Is the patient here for a new oncology consult visit in cancer care? 2. No      Clinical concerns: Follow up     Having cholecystectomy 6/14. Wondering if his gallbladder problems are related to his prostate surgery?       Diana Downing MA              "

## 2024-06-10 ENCOUNTER — OFFICE VISIT (OUTPATIENT)
Dept: FAMILY MEDICINE | Facility: CLINIC | Age: 64
End: 2024-06-10
Payer: COMMERCIAL

## 2024-06-10 VITALS
DIASTOLIC BLOOD PRESSURE: 80 MMHG | WEIGHT: 249 LBS | RESPIRATION RATE: 20 BRPM | TEMPERATURE: 97.3 F | OXYGEN SATURATION: 96 % | SYSTOLIC BLOOD PRESSURE: 120 MMHG | HEART RATE: 79 BPM | HEIGHT: 72 IN | BODY MASS INDEX: 33.72 KG/M2

## 2024-06-10 DIAGNOSIS — M1A.9XX0 CHRONIC GOUT INVOLVING TOE WITHOUT TOPHUS, UNSPECIFIED CAUSE, UNSPECIFIED LATERALITY: ICD-10-CM

## 2024-06-10 DIAGNOSIS — K21.9 GASTROESOPHAGEAL REFLUX DISEASE WITHOUT ESOPHAGITIS: ICD-10-CM

## 2024-06-10 DIAGNOSIS — R05.1 ACUTE COUGH: ICD-10-CM

## 2024-06-10 DIAGNOSIS — I10 ESSENTIAL HYPERTENSION WITH GOAL BLOOD PRESSURE LESS THAN 140/90: ICD-10-CM

## 2024-06-10 DIAGNOSIS — Z13.1 SCREENING FOR DIABETES MELLITUS: ICD-10-CM

## 2024-06-10 DIAGNOSIS — K80.20 GALLSTONES: ICD-10-CM

## 2024-06-10 DIAGNOSIS — Z01.818 PREOP GENERAL PHYSICAL EXAM: Primary | ICD-10-CM

## 2024-06-10 LAB
ANION GAP SERPL CALCULATED.3IONS-SCNC: 14 MMOL/L (ref 7–15)
BUN SERPL-MCNC: 15.3 MG/DL (ref 8–23)
CALCIUM SERPL-MCNC: 9.1 MG/DL (ref 8.8–10.2)
CHLORIDE SERPL-SCNC: 106 MMOL/L (ref 98–107)
CREAT SERPL-MCNC: 0.9 MG/DL (ref 0.67–1.17)
DEPRECATED HCO3 PLAS-SCNC: 22 MMOL/L (ref 22–29)
EGFRCR SERPLBLD CKD-EPI 2021: >90 ML/MIN/1.73M2
ERYTHROCYTE [DISTWIDTH] IN BLOOD BY AUTOMATED COUNT: 13.6 % (ref 10–15)
GLUCOSE SERPL-MCNC: 127 MG/DL (ref 70–99)
HBA1C MFR BLD: 6.1 % (ref 0–5.6)
HCT VFR BLD AUTO: 44.7 % (ref 40–53)
HGB BLD-MCNC: 14.1 G/DL (ref 13.3–17.7)
MCH RBC QN AUTO: 27.3 PG (ref 26.5–33)
MCHC RBC AUTO-ENTMCNC: 31.5 G/DL (ref 31.5–36.5)
MCV RBC AUTO: 87 FL (ref 78–100)
PLATELET # BLD AUTO: 208 10E3/UL (ref 150–450)
POTASSIUM SERPL-SCNC: 4.4 MMOL/L (ref 3.4–5.3)
RBC # BLD AUTO: 5.17 10E6/UL (ref 4.4–5.9)
SODIUM SERPL-SCNC: 142 MMOL/L (ref 135–145)
WBC # BLD AUTO: 5.4 10E3/UL (ref 4–11)

## 2024-06-10 PROCEDURE — 99214 OFFICE O/P EST MOD 30 MIN: CPT | Performed by: NURSE PRACTITIONER

## 2024-06-10 PROCEDURE — 36415 COLL VENOUS BLD VENIPUNCTURE: CPT | Performed by: NURSE PRACTITIONER

## 2024-06-10 PROCEDURE — 83036 HEMOGLOBIN GLYCOSYLATED A1C: CPT | Performed by: NURSE PRACTITIONER

## 2024-06-10 PROCEDURE — G2211 COMPLEX E/M VISIT ADD ON: HCPCS | Performed by: NURSE PRACTITIONER

## 2024-06-10 PROCEDURE — 80048 BASIC METABOLIC PNL TOTAL CA: CPT | Performed by: NURSE PRACTITIONER

## 2024-06-10 PROCEDURE — 85027 COMPLETE CBC AUTOMATED: CPT | Performed by: NURSE PRACTITIONER

## 2024-06-10 PROCEDURE — 93000 ELECTROCARDIOGRAM COMPLETE: CPT | Performed by: NURSE PRACTITIONER

## 2024-06-10 RX ORDER — LISINOPRIL 30 MG/1
30 TABLET ORAL EVERY MORNING
Qty: 90 TABLET | Refills: 3 | Status: SHIPPED | OUTPATIENT
Start: 2024-06-10

## 2024-06-10 RX ORDER — ALLOPURINOL 300 MG/1
1 TABLET ORAL EVERY MORNING
Qty: 90 TABLET | Refills: 3 | Status: SHIPPED | OUTPATIENT
Start: 2024-06-10

## 2024-06-10 RX ORDER — AMLODIPINE BESYLATE 5 MG/1
5 TABLET ORAL DAILY
Qty: 90 TABLET | Refills: 3 | Status: SHIPPED | OUTPATIENT
Start: 2024-06-10

## 2024-06-10 RX ORDER — ALBUTEROL SULFATE 90 UG/1
AEROSOL, METERED RESPIRATORY (INHALATION)
Qty: 18 G | Refills: 0 | Status: SHIPPED | OUTPATIENT
Start: 2024-06-10

## 2024-06-10 ASSESSMENT — PAIN SCALES - GENERAL: PAINLEVEL: NO PAIN (0)

## 2024-06-10 NOTE — H&P (VIEW-ONLY)
Preoperative Evaluation  [facility]  [address] [place] [address]  Phone: [phone]  Fax: [phone]  Primary Provider: [facility]  Pre-op Performing Provider: TEO Roe CNP  Geoffrey 10, 2024             6/5/2024   Surgical Information   What procedure is being done? Gallbladder removal   Facility or Hospital where procedure/surgery will be performed: [place]   Who is doing the procedure / surgery? DR. Kramer   Date of surgery / procedure: 6-   Time of surgery / procedure: not told yet   Where do you plan to recover after surgery? at home alone     Fax number for surgical facility: Note does not need to be faxed, will be available electronically in Epic.    Assessment & Plan     The proposed surgical procedure is considered LOW risk.    Problem List Items Addressed This Visit          Digestive    Esophageal reflux (GERD)    Relevant Medications    omeprazole (PRILOSEC) 20 MG DR capsule       Endocrine    Gout    Relevant Medications    allopurinol (ZYLOPRIM) 300 MG tablet       Circulatory    Essential hypertension with goal blood pressure less than 140/90    Relevant Medications    lisinopril (ZESTRIL) 30 MG tablet    amLODIPine (NORVASC) 5 MG tablet     Other Visit Diagnoses       Preop general physical exam    -  Primary    Relevant Orders    EKG 12-lead complete w/read - Clinics (Completed)    Basic metabolic panel (Completed)    CBC with platelets (Completed)    Gallstones        Acute cough        Relevant Medications    VENTOLIN  (90 Base) MCG/ACT inhaler    Screening for diabetes mellitus        Relevant Orders    Hemoglobin A1c (Completed)              Possible Sleep Apnea: no       2/13/2024    10:20 AM   STOP-Bang Total Score   Total Score 7           - No identified additional risk factors other than previously addressed    Antiplatelet or Anticoagulation Medication Instructions   - Patient is on no  antiplatelet or anticoagulation medications.    Additional Medication Instructions   - ibuprofen (Advil, Motrin): DO NOT TAKE 1 day before surgery.     Recommendation  Approval given to proceed with proposed procedure, without further diagnostic evaluation.        Shawn Menendez is a 63 year old, presenting for the following:  Pre-Op Exam          6/10/2024     8:35 AM   Additional Questions   Roomed by Katalina         6/10/2024   Forms   Any forms needing to be completed Yes     HPI related to upcoming procedure:         6/5/2024   Pre-Op Questionnaire   Have you ever had a heart attack or stroke? No   Have you ever had surgery on your heart or blood vessels, such as a stent placement, a coronary artery bypass, or surgery on an artery in your head, neck, heart, or legs? No   Do you have chest pain with activity? No   Do you have a history of heart failure? No   Do you currently have a cold, bronchitis or symptoms of other infection? No   Do you have a cough, shortness of breath, or wheezing? No   Do you or anyone in your family have previous history of blood clots? (!) UNKNOWN not the patient    Do you or does anyone in your family have a serious bleeding problem such as prolonged bleeding following surgeries or cuts? (!) YES father had an issue    Have you ever had problems with anemia or been told to take iron pills? No   Have you had any abnormal blood loss such as black, tarry or bloody stools? No   Have you ever had a blood transfusion? No   Are you willing to have a blood transfusion if it is medically needed before, during, or after your surgery? Yes   Have you or any of your relatives ever had problems with anesthesia? (!) YES not respiratory nausea only    Do you have sleep apnea, excessive snoring or daytime drowsiness? (!) YES not tested    Do you have a CPAP machine? (!) NO    Do you have any artifical heart valves or other implanted medical devices like a pacemaker, defibrillator, or continuous  glucose monitor? No   Do you have artificial joints? (!) YES   Are you allergic to latex? No     Health Care Directive  Patient does not have a Health Care Directive or Living Will: Discussed advance care planning with patient; however, patient declined at this time.    Preoperative Review of    reviewed - no record of controlled substances prescribed.        Status of Chronic Conditions:  See problem list for active medical problems.  Problems all longstanding and stable, except as noted/documented.  See ROS for pertinent symptoms related to these conditions.    Patient Active Problem List    Diagnosis Date Noted    Post-operative state 02/29/2024     Priority: Medium    Class 2 severe obesity due to excess calories with serious comorbidity in adult (H) 02/13/2024     Priority: Medium    Iron deficiency anemia 03/18/2015     Priority: Medium    CARDIOVASCULAR SCREENING; LDL GOAL LESS THAN 130 03/13/2015     Priority: Medium    Esophageal reflux (GERD) 10/27/2014     Priority: Medium    Essential hypertension with goal blood pressure less than 140/90 10/20/2014     Priority: Medium    Tremor 10/20/2014     Priority: Medium     10/20/2014:He has been on a beta-blocker for this-propranolol.  It helped blood pressure and tremor but made him tired.   Strong family history tremor.       Gout 10/20/2014     Priority: Medium     10/20/2014:HE has done well with allopurinol.       ED (erectile dysfunction) 10/20/2014     Priority: Medium    Psoriasis 10/20/2014     Priority: Medium      Past Medical History:   Diagnosis Date    Cancer (H)     ED (erectile dysfunction)     GERD (gastroesophageal reflux disease)     Gout     Hypertension     Prostate cancer (H)      Past Surgical History:   Procedure Laterality Date    COLONOSCOPY N/A 10/08/2015    Procedure: COLONOSCOPY;  Surgeon: Rolando Ayers MD;  Location: [facility]    DAVINC PROSTATECTOMY, LYMPHADENECTOMY N/A 2/29/2024    Procedure: ROBOT-ASSISTED LAPAROSCOPIC  RADICAL PROSTATECTOMY WITH BILATERAL PELVIC LYMPHADENECTOMY;  Surgeon: Elizabeth Diop MD;  Location:  OR    ESOPHAGOSCOPY, GASTROSCOPY, DUODENOSCOPY (EGD), COMBINED N/A 11/03/2014    Procedure: COMBINED ESOPHAGOSCOPY, GASTROSCOPY, DUODENOSCOPY (EGD);  Surgeon: Rolando Ayers MD;  Location: [facility]    ESOPHAGOSCOPY, GASTROSCOPY, DUODENOSCOPY (EGD), COMBINED N/A 10/08/2015    Procedure: COMBINED ESOPHAGOSCOPY, GASTROSCOPY, DUODENOSCOPY (EGD), BIOPSY SINGLE OR MULTIPLE;  Surgeon: Rolando Ayers MD;  Location: [facility]    HERNIA REPAIR  01/01/2007    umbilical    TOTAL HIP ARTHROPLASTY Right 2021     Current Outpatient Medications   Medication Sig Dispense Refill    acetaminophen (TYLENOL) 500 MG tablet Take 1,000 mg by mouth as needed for mild pain      allopurinol (ZYLOPRIM) 300 MG tablet Take 1 tablet (300 mg) by mouth daily (Patient taking differently: Take 1 tablet by mouth every morning) 90 tablet 3    amLODIPine (NORVASC) 5 MG tablet Take 1 tablet (5 mg) by mouth daily 90 tablet 3    lisinopril (ZESTRIL) 30 MG tablet Take 1 tablet (30 mg) by mouth daily (Patient taking differently: Take 30 mg by mouth every morning) 90 tablet 3    omeprazole (PRILOSEC) 20 MG DR capsule Take 1 capsule (20 mg) by mouth daily (Patient taking differently: Take 20 mg by mouth every morning) 90 capsule 3    VENTOLIN  (90 Base) MCG/ACT inhaler INHALE 2 PUFFS BY MOUTH EVERY 6 HOURS AS NEEDED FOR SHORTNESS OF BREATH /DYSPNEA  OR  WHEEZING 18 g 0    sildenafil (REVATIO) 20 MG tablet TAKE BY MOUTH AS MANY TABLETS (MAX OF 5 TABS) AS NEEDED AT A TIME PRIOR TO INTERCOURSE. (Patient not taking: Reported on 6/10/2024) 30 tablet 0       Allergies   Allergen Reactions    Oxycodone Nausea and Vomiting    Valdecoxib Hives     Bextra        Social History     Tobacco Use    Smoking status: Never    Smokeless tobacco: Never   Substance Use Topics    Alcohol use: Not Currently     Comment: occas     Family History   Problem Relation Age of Onset     Cancer Mother         lymphoma    Hypertension Father     Circulatory Father     Gastrointestinal Disease Father     Lipids Father     Alzheimer Disease Maternal Grandfather     Anesthesia Reaction Maternal Grandfather         memory issues    Prostate Cancer Maternal Grandfather     Cardiovascular Paternal Grandfather     Heart Disease Paternal Grandfather     Asthma Son         allergy induced    Asthma Son         al;lergy induced    Coronary Artery Disease No family hx of     Colon Cancer No family hx of     Venous thrombosis No family hx of      History   Drug Use No             Review of Systems  Constitutional, neuro, ENT, endocrine, pulmonary, cardiac, gastrointestinal, genitourinary, musculoskeletal, integument and psychiatric systems are negative, except as otherwise noted.    Objective    /80 (BP Location: Right arm)   Pulse 79   Temp 97.3  F (36.3  C) (Tympanic)   Resp 20   Ht 1.829 m (6')   Wt 112.9 kg (249 lb)   SpO2 96%   BMI 33.77 kg/m     Estimated body mass index is 33.77 kg/m  as calculated from the following:    Height as of this encounter: 1.829 m (6').    Weight as of this encounter: 112.9 kg (249 lb).  Physical Exam  GENERAL: alert and no distress  NECK: no adenopathy, no asymmetry, masses, or scars  RESP: lungs clear to auscultation - no rales, rhonchi or wheezes  CV: regular rate and rhythm, normal S1 S2, no S3 or S4, no murmur, click or rub, no peripheral edema  ABDOMEN: soft, nontender, no hepatosplenomegaly, no masses and bowel sounds normal  MS: no gross musculoskeletal defects noted, no edema    Recent Labs   Lab Test 04/21/24  1006 04/02/24  0908 02/13/24  1035 06/29/23  0736   HGB 14.8 14.3   < > 14.5    183   < > 180    142   < > 141   POTASSIUM 3.8 4.4   < > 4.2   CR 0.89 1.01   < > 0.97   A1C  --   --   --  5.8*    < > = values in this interval not displayed.        Diagnostics  Recent Results (from the past 24 hour(s))   CBC with platelets    Collection  Time: 06/10/24  9:17 AM   Result Value Ref Range    WBC Count 5.4 4.0 - 11.0 10e3/uL    RBC Count 5.17 4.40 - 5.90 10e6/uL    Hemoglobin 14.1 13.3 - 17.7 g/dL    Hematocrit 44.7 40.0 - 53.0 %    MCV 87 78 - 100 fL    MCH 27.3 26.5 - 33.0 pg    MCHC 31.5 31.5 - 36.5 g/dL    RDW 13.6 10.0 - 15.0 %    Platelet Count 208 150 - 450 10e3/uL   Basic metabolic panel    Collection Time: 06/10/24  9:17 AM   Result Value Ref Range    Sodium 142 135 - 145 mmol/L    Potassium 4.4 3.4 - 5.3 mmol/L    Chloride 106 98 - 107 mmol/L    Carbon Dioxide (CO2) 22 22 - 29 mmol/L    Anion Gap 14 7 - 15 mmol/L    Urea Nitrogen 15.3 8.0 - 23.0 mg/dL    Creatinine 0.90 0.67 - 1.17 mg/dL    GFR Estimate >90 >60 mL/min/1.73m2    Calcium 9.1 8.8 - 10.2 mg/dL    Glucose 127 (H) 70 - 99 mg/dL   Hemoglobin A1c    Collection Time: 06/10/24  9:17 AM   Result Value Ref Range    Hemoglobin A1C 6.1 (H) 0.0 - 5.6 %      EKG: appears normal, NSR, normal axis, normal intervals, no acute ST/T changes c/w ischemia, no LVH by voltage criteria, unchanged from previous tracings    Revised Cardiac Risk Index (RCRI)  The patient has the following serious cardiovascular risks for perioperative complications:   - No serious cardiac risks = 0 points     RCRI Interpretation: 0 points: Class I (very low risk - 0.4% complication rate)         Signed Electronically by: TEO Roe CNP  Copy of this evaluation report is provided to requesting physician.

## 2024-06-10 NOTE — PROGRESS NOTES
Preoperative Evaluation  [facility]  [address] [place] [id number]  Phone: [phone]  Fax: [phone]  Primary Provider: [facility]  Pre-op Performing Provider: TEO Roe CNP  Geoffrey 10, 2024             6/5/2024   Surgical Information   What procedure is being done? Gallbladder removal   Facility or Hospital where procedure/surgery will be performed: [place]   Who is doing the procedure / surgery? DR. Kramer   Date of surgery / procedure: 6-   Time of surgery / procedure: not told yet   Where do you plan to recover after surgery? at home alone     Fax number for surgical facility: Note does not need to be faxed, will be available electronically in Epic.    Assessment & Plan     The proposed surgical procedure is considered LOW risk.    Problem List Items Addressed This Visit          Digestive    Esophageal reflux (GERD)    Relevant Medications    omeprazole (PRILOSEC) 20 MG DR capsule       Endocrine    Gout    Relevant Medications    allopurinol (ZYLOPRIM) 300 MG tablet       Circulatory    Essential hypertension with goal blood pressure less than 140/90    Relevant Medications    lisinopril (ZESTRIL) 30 MG tablet    amLODIPine (NORVASC) 5 MG tablet     Other Visit Diagnoses       Preop general physical exam    -  Primary    Relevant Orders    EKG 12-lead complete w/read - Clinics (Completed)    Basic metabolic panel (Completed)    CBC with platelets (Completed)    Gallstones        Acute cough        Relevant Medications    VENTOLIN  (90 Base) MCG/ACT inhaler    Screening for diabetes mellitus        Relevant Orders    Hemoglobin A1c (Completed)              Possible Sleep Apnea: no       2/13/2024    10:20 AM   STOP-Bang Total Score   Total Score 7           - No identified additional risk factors other than previously addressed    Antiplatelet or Anticoagulation Medication Instructions   - Patient is on no  antiplatelet or anticoagulation medications.    Additional Medication Instructions   - ibuprofen (Advil, Motrin): DO NOT TAKE 1 day before surgery.     Recommendation  Approval given to proceed with proposed procedure, without further diagnostic evaluation.        Shawn Menendez is a 63 year old, presenting for the following:  Pre-Op Exam          6/10/2024     8:35 AM   Additional Questions   Roomed by Katalina         6/10/2024   Forms   Any forms needing to be completed Yes     HPI related to upcoming procedure:         6/5/2024   Pre-Op Questionnaire   Have you ever had a heart attack or stroke? No   Have you ever had surgery on your heart or blood vessels, such as a stent placement, a coronary artery bypass, or surgery on an artery in your head, neck, heart, or legs? No   Do you have chest pain with activity? No   Do you have a history of heart failure? No   Do you currently have a cold, bronchitis or symptoms of other infection? No   Do you have a cough, shortness of breath, or wheezing? No   Do you or anyone in your family have previous history of blood clots? (!) UNKNOWN not the patient    Do you or does anyone in your family have a serious bleeding problem such as prolonged bleeding following surgeries or cuts? (!) YES father had an issue    Have you ever had problems with anemia or been told to take iron pills? No   Have you had any abnormal blood loss such as black, tarry or bloody stools? No   Have you ever had a blood transfusion? No   Are you willing to have a blood transfusion if it is medically needed before, during, or after your surgery? Yes   Have you or any of your relatives ever had problems with anesthesia? (!) YES not respiratory nausea only    Do you have sleep apnea, excessive snoring or daytime drowsiness? (!) YES not tested    Do you have a CPAP machine? (!) NO    Do you have any artifical heart valves or other implanted medical devices like a pacemaker, defibrillator, or continuous  glucose monitor? No   Do you have artificial joints? (!) YES   Are you allergic to latex? No     Health Care Directive  Patient does not have a Health Care Directive or Living Will: Discussed advance care planning with patient; however, patient declined at this time.    Preoperative Review of    reviewed - no record of controlled substances prescribed.        Status of Chronic Conditions:  See problem list for active medical problems.  Problems all longstanding and stable, except as noted/documented.  See ROS for pertinent symptoms related to these conditions.    Patient Active Problem List    Diagnosis Date Noted    Post-operative state 02/29/2024     Priority: Medium    Class 2 severe obesity due to excess calories with serious comorbidity in adult (H) 02/13/2024     Priority: Medium    Iron deficiency anemia 03/18/2015     Priority: Medium    CARDIOVASCULAR SCREENING; LDL GOAL LESS THAN 130 03/13/2015     Priority: Medium    Esophageal reflux (GERD) 10/27/2014     Priority: Medium    Essential hypertension with goal blood pressure less than 140/90 10/20/2014     Priority: Medium    Tremor 10/20/2014     Priority: Medium     10/20/2014:He has been on a beta-blocker for this-propranolol.  It helped blood pressure and tremor but made him tired.   Strong family history tremor.       Gout 10/20/2014     Priority: Medium     10/20/2014:HE has done well with allopurinol.       ED (erectile dysfunction) 10/20/2014     Priority: Medium    Psoriasis 10/20/2014     Priority: Medium      Past Medical History:   Diagnosis Date    Cancer (H)     ED (erectile dysfunction)     GERD (gastroesophageal reflux disease)     Gout     Hypertension     Prostate cancer (H)      Past Surgical History:   Procedure Laterality Date    COLONOSCOPY N/A 10/08/2015    Procedure: COLONOSCOPY;  Surgeon: Rolando Ayers MD;  Location: [facility]    DAVINC PROSTATECTOMY, LYMPHADENECTOMY N/A 2/29/2024    Procedure: ROBOT-ASSISTED LAPAROSCOPIC  RADICAL PROSTATECTOMY WITH BILATERAL PELVIC LYMPHADENECTOMY;  Surgeon: Elizabeth Diop MD;  Location:  OR    ESOPHAGOSCOPY, GASTROSCOPY, DUODENOSCOPY (EGD), COMBINED N/A 11/03/2014    Procedure: COMBINED ESOPHAGOSCOPY, GASTROSCOPY, DUODENOSCOPY (EGD);  Surgeon: Rolando Ayers MD;  Location: [facility]    ESOPHAGOSCOPY, GASTROSCOPY, DUODENOSCOPY (EGD), COMBINED N/A 10/08/2015    Procedure: COMBINED ESOPHAGOSCOPY, GASTROSCOPY, DUODENOSCOPY (EGD), BIOPSY SINGLE OR MULTIPLE;  Surgeon: Rolando Ayers MD;  Location: [facility]    HERNIA REPAIR  01/01/2007    umbilical    TOTAL HIP ARTHROPLASTY Right 2021     Current Outpatient Medications   Medication Sig Dispense Refill    acetaminophen (TYLENOL) 500 MG tablet Take 1,000 mg by mouth as needed for mild pain      allopurinol (ZYLOPRIM) 300 MG tablet Take 1 tablet (300 mg) by mouth daily (Patient taking differently: Take 1 tablet by mouth every morning) 90 tablet 3    amLODIPine (NORVASC) 5 MG tablet Take 1 tablet (5 mg) by mouth daily 90 tablet 3    lisinopril (ZESTRIL) 30 MG tablet Take 1 tablet (30 mg) by mouth daily (Patient taking differently: Take 30 mg by mouth every morning) 90 tablet 3    omeprazole (PRILOSEC) 20 MG DR capsule Take 1 capsule (20 mg) by mouth daily (Patient taking differently: Take 20 mg by mouth every morning) 90 capsule 3    VENTOLIN  (90 Base) MCG/ACT inhaler INHALE 2 PUFFS BY MOUTH EVERY 6 HOURS AS NEEDED FOR SHORTNESS OF BREATH /DYSPNEA  OR  WHEEZING 18 g 0    sildenafil (REVATIO) 20 MG tablet TAKE BY MOUTH AS MANY TABLETS (MAX OF 5 TABS) AS NEEDED AT A TIME PRIOR TO INTERCOURSE. (Patient not taking: Reported on 6/10/2024) 30 tablet 0       Allergies   Allergen Reactions    Oxycodone Nausea and Vomiting    Valdecoxib Hives     Bextra        Social History     Tobacco Use    Smoking status: Never    Smokeless tobacco: Never   Substance Use Topics    Alcohol use: Not Currently     Comment: occas     Family History   Problem Relation Age of Onset     Cancer Mother         lymphoma    Hypertension Father     Circulatory Father     Gastrointestinal Disease Father     Lipids Father     Alzheimer Disease Maternal Grandfather     Anesthesia Reaction Maternal Grandfather         memory issues    Prostate Cancer Maternal Grandfather     Cardiovascular Paternal Grandfather     Heart Disease Paternal Grandfather     Asthma Son         allergy induced    Asthma Son         al;lergy induced    Coronary Artery Disease No family hx of     Colon Cancer No family hx of     Venous thrombosis No family hx of      History   Drug Use No             Review of Systems  Constitutional, neuro, ENT, endocrine, pulmonary, cardiac, gastrointestinal, genitourinary, musculoskeletal, integument and psychiatric systems are negative, except as otherwise noted.    Objective    /80 (BP Location: Right arm)   Pulse 79   Temp 97.3  F (36.3  C) (Tympanic)   Resp 20   Ht 1.829 m (6')   Wt 112.9 kg (249 lb)   SpO2 96%   BMI 33.77 kg/m     Estimated body mass index is 33.77 kg/m  as calculated from the following:    Height as of this encounter: 1.829 m (6').    Weight as of this encounter: 112.9 kg (249 lb).  Physical Exam  GENERAL: alert and no distress  NECK: no adenopathy, no asymmetry, masses, or scars  RESP: lungs clear to auscultation - no rales, rhonchi or wheezes  CV: regular rate and rhythm, normal S1 S2, no S3 or S4, no murmur, click or rub, no peripheral edema  ABDOMEN: soft, nontender, no hepatosplenomegaly, no masses and bowel sounds normal  MS: no gross musculoskeletal defects noted, no edema    Recent Labs   Lab Test 04/21/24  1006 04/02/24  0908 02/13/24  1035 06/29/23  0736   HGB 14.8 14.3   < > 14.5    183   < > 180    142   < > 141   POTASSIUM 3.8 4.4   < > 4.2   CR 0.89 1.01   < > 0.97   A1C  --   --   --  5.8*    < > = values in this interval not displayed.        Diagnostics  Recent Results (from the past 24 hour(s))   CBC with platelets    Collection  Time: 06/10/24  9:17 AM   Result Value Ref Range    WBC Count 5.4 4.0 - 11.0 10e3/uL    RBC Count 5.17 4.40 - 5.90 10e6/uL    Hemoglobin 14.1 13.3 - 17.7 g/dL    Hematocrit 44.7 40.0 - 53.0 %    MCV 87 78 - 100 fL    MCH 27.3 26.5 - 33.0 pg    MCHC 31.5 31.5 - 36.5 g/dL    RDW 13.6 10.0 - 15.0 %    Platelet Count 208 150 - 450 10e3/uL   Basic metabolic panel    Collection Time: 06/10/24  9:17 AM   Result Value Ref Range    Sodium 142 135 - 145 mmol/L    Potassium 4.4 3.4 - 5.3 mmol/L    Chloride 106 98 - 107 mmol/L    Carbon Dioxide (CO2) 22 22 - 29 mmol/L    Anion Gap 14 7 - 15 mmol/L    Urea Nitrogen 15.3 8.0 - 23.0 mg/dL    Creatinine 0.90 0.67 - 1.17 mg/dL    GFR Estimate >90 >60 mL/min/1.73m2    Calcium 9.1 8.8 - 10.2 mg/dL    Glucose 127 (H) 70 - 99 mg/dL   Hemoglobin A1c    Collection Time: 06/10/24  9:17 AM   Result Value Ref Range    Hemoglobin A1C 6.1 (H) 0.0 - 5.6 %      EKG: appears normal, NSR, normal axis, normal intervals, no acute ST/T changes c/w ischemia, no LVH by voltage criteria, unchanged from previous tracings    Revised Cardiac Risk Index (RCRI)  The patient has the following serious cardiovascular risks for perioperative complications:   - No serious cardiac risks = 0 points     RCRI Interpretation: 0 points: Class I (very low risk - 0.4% complication rate)         Signed Electronically by: TEO Roe CNP  Copy of this evaluation report is provided to requesting physician.

## 2024-06-13 ENCOUNTER — ANESTHESIA EVENT (OUTPATIENT)
Dept: SURGERY | Facility: CLINIC | Age: 64
End: 2024-06-13
Payer: COMMERCIAL

## 2024-06-13 NOTE — ANESTHESIA PREPROCEDURE EVALUATION
Anesthesia Pre-Procedure Evaluation    Patient: Shon Sargent   MRN: [id number] : 1960        Procedure : Procedure(s):  CHOLECYSTECTOMY, LAPAROSCOPIC          Past Medical History:   Diagnosis Date    Cancer (H)     ED (erectile dysfunction)     GERD (gastroesophageal reflux disease)     Gout     Hypertension     Prostate cancer (H)       Past Surgical History:   Procedure Laterality Date    COLONOSCOPY N/A 10/08/2015    Procedure: COLONOSCOPY;  Surgeon: Rolando Ayers MD;  Location: [facility]    DAVINCI PROSTATECTOMY, LYMPHADENECTOMY N/A 2024    Procedure: ROBOT-ASSISTED LAPAROSCOPIC RADICAL PROSTATECTOMY WITH BILATERAL PELVIC LYMPHADENECTOMY;  Surgeon: Elizabeth Diop MD;  Location:  OR    ESOPHAGOSCOPY, GASTROSCOPY, DUODENOSCOPY (EGD), COMBINED N/A 2014    Procedure: COMBINED ESOPHAGOSCOPY, GASTROSCOPY, DUODENOSCOPY (EGD);  Surgeon: Rolando Ayers MD;  Location: [facility]    ESOPHAGOSCOPY, GASTROSCOPY, DUODENOSCOPY (EGD), COMBINED N/A 10/08/2015    Procedure: COMBINED ESOPHAGOSCOPY, GASTROSCOPY, DUODENOSCOPY (EGD), BIOPSY SINGLE OR MULTIPLE;  Surgeon: Rolando Ayers MD;  Location: [facility]    HERNIA REPAIR  2007    umbilical    TOTAL HIP ARTHROPLASTY Right       Allergies   Allergen Reactions    Oxycodone Nausea and Vomiting    Valdecoxib Hives     Bextra      Social History     Tobacco Use    Smoking status: Never    Smokeless tobacco: Never   Substance Use Topics    Alcohol use: Not Currently     Comment: occas      Wt Readings from Last 1 Encounters:   06/10/24 112.9 kg (249 lb)        Anesthesia Evaluation   Pt has had prior anesthetic. Type: General, MAC and Regional.        ROS/MED HX  ENT/Pulmonary:       Neurologic:  - neg neurologic ROS     Cardiovascular:     (+) Dyslipidemia hypertension- -   -  - -                                      METS/Exercise Tolerance:     Hematologic:     (+)      anemia,          Musculoskeletal:       GI/Hepatic:     (+) GERD,                  "  Renal/Genitourinary:       Endo:     (+)               Obesity,       Psychiatric/Substance Use:       Infectious Disease:       Malignancy:   (+) Malignancy,     Other:            Physical Exam    Airway        Mallampati: II   TM distance: > 3 FB   Neck ROM: full   Mouth opening: > 3 cm    Respiratory Devices and Support         Dental       (+) Minor Abnormalities - some fillings, tiny chips      Cardiovascular   cardiovascular exam normal       Rhythm and rate: regular and normal     Pulmonary   pulmonary exam normal        breath sounds clear to auscultation           OUTSIDE LABS:  CBC:   Lab Results   Component Value Date    WBC 5.4 06/10/2024    WBC 10.3 04/21/2024    HGB 14.1 06/10/2024    HGB 14.8 04/21/2024    HCT 44.7 06/10/2024    HCT 45.0 04/21/2024     06/10/2024     04/21/2024     BMP:   Lab Results   Component Value Date     06/10/2024     04/21/2024    POTASSIUM 4.4 06/10/2024    POTASSIUM 3.8 04/21/2024    CHLORIDE 106 06/10/2024    CHLORIDE 103 04/21/2024    CO2 22 06/10/2024    CO2 21 (L) 04/21/2024    BUN 15.3 06/10/2024    BUN 12.4 04/21/2024    CR 0.90 06/10/2024    CR 0.89 04/21/2024     (H) 06/10/2024     (H) 04/21/2024     COAGS:   Lab Results   Component Value Date    INR 1.03 09/02/2019     POC: No results found for: \"BGM\", \"HCG\", \"HCGS\"  HEPATIC:   Lab Results   Component Value Date    ALBUMIN 4.4 04/21/2024    PROTTOTAL 7.4 04/21/2024    ALT 23 04/21/2024    AST 24 04/21/2024    ALKPHOS 114 04/21/2024    BILITOTAL 0.5 04/21/2024     OTHER:   Lab Results   Component Value Date    LACT 1.5 04/21/2024    A1C 6.1 (H) 06/10/2024    ALIX 9.1 06/10/2024    LIPASE 24 04/21/2024       Anesthesia Plan    ASA Status:  3    NPO Status:  NPO Appropriate    Anesthesia Type: General.     - Airway: ETT   Induction: Propofol, Intravenous.   Maintenance: TIVA.        Consents    Anesthesia Plan(s) and associated risks, benefits, and realistic alternatives " discussed. Questions answered and patient/representative(s) expressed understanding.     - Discussed: Risks, Benefits and Alternatives for BOTH SEDATION and the PROCEDURE were discussed     - Discussed with:  Patient            Postoperative Care    Pain management: IV analgesics, Oral pain medications, Multi-modal analgesia.   PONV prophylaxis: Background Propofol Infusion, Ondansetron (or other 5HT-3), Dexamethasone or Solumedrol     Comments:               TEO Gordon CRNA    I have reviewed the pertinent notes and labs in the chart from the past 30 days and (re)examined the patient.  Any updates or changes from those notes are reflected in this note.              # Obesity: Estimated body mass index is 33.77 kg/m  as calculated from the following:    Height as of 6/10/24: 1.829 m (6').    Weight as of 6/10/24: 112.9 kg (249 lb).

## 2024-06-14 ENCOUNTER — ANESTHESIA (OUTPATIENT)
Dept: SURGERY | Facility: CLINIC | Age: 64
End: 2024-06-14
Payer: COMMERCIAL

## 2024-06-14 ENCOUNTER — PREP FOR PROCEDURE (OUTPATIENT)
Dept: GASTROENTEROLOGY | Facility: CLINIC | Age: 64
End: 2024-06-14

## 2024-06-14 ENCOUNTER — APPOINTMENT (OUTPATIENT)
Dept: GENERAL RADIOLOGY | Facility: CLINIC | Age: 64
End: 2024-06-14
Attending: SURGERY
Payer: COMMERCIAL

## 2024-06-14 ENCOUNTER — HOSPITAL ENCOUNTER (OUTPATIENT)
Facility: CLINIC | Age: 64
Discharge: HOME | End: 2024-06-14
Attending: SURGERY | Admitting: SURGERY
Payer: COMMERCIAL

## 2024-06-14 ENCOUNTER — PATIENT OUTREACH (OUTPATIENT)
Dept: GASTROENTEROLOGY | Facility: CLINIC | Age: 64
End: 2024-06-14

## 2024-06-14 VITALS
BODY MASS INDEX: 33.72 KG/M2 | SYSTOLIC BLOOD PRESSURE: 120 MMHG | WEIGHT: 249 LBS | HEIGHT: 72 IN | HEART RATE: 68 BPM | OXYGEN SATURATION: 97 % | RESPIRATION RATE: 12 BRPM | DIASTOLIC BLOOD PRESSURE: 78 MMHG | TEMPERATURE: 98.2 F

## 2024-06-14 DIAGNOSIS — K80.50 BILE DUCT STONE: Primary | ICD-10-CM

## 2024-06-14 DIAGNOSIS — Z90.49 S/P CHOLECYSTECTOMY: Primary | ICD-10-CM

## 2024-06-14 PROCEDURE — 88304 TISSUE EXAM BY PATHOLOGIST: CPT | Mod: 26 | Performed by: PATHOLOGY

## 2024-06-14 PROCEDURE — 47563 LAPARO CHOLECYSTECTOMY/GRAPH: CPT | Mod: AS | Performed by: PHYSICIAN ASSISTANT

## 2024-06-14 PROCEDURE — 250N000011 HC RX IP 250 OP 636: Performed by: SURGERY

## 2024-06-14 PROCEDURE — 47563 LAPARO CHOLECYSTECTOMY/GRAPH: CPT | Performed by: SURGERY

## 2024-06-14 PROCEDURE — 272N000001 HC OR GENERAL SUPPLY STERILE: Performed by: SURGERY

## 2024-06-14 PROCEDURE — 250N000013 HC RX MED GY IP 250 OP 250 PS 637

## 2024-06-14 PROCEDURE — 999N000179 XR SURGERY CARM FLUORO LESS THAN 5 MIN W STILLS: Mod: TC

## 2024-06-14 PROCEDURE — 710N000012 HC RECOVERY PHASE 2, PER MINUTE: Performed by: SURGERY

## 2024-06-14 PROCEDURE — 250N000013 HC RX MED GY IP 250 OP 250 PS 637: Performed by: PHYSICIAN ASSISTANT

## 2024-06-14 PROCEDURE — 370N000017 HC ANESTHESIA TECHNICAL FEE, PER MIN: Performed by: SURGERY

## 2024-06-14 PROCEDURE — 250N000025 HC SEVOFLURANE, PER MIN: Performed by: SURGERY

## 2024-06-14 PROCEDURE — 250N000013 HC RX MED GY IP 250 OP 250 PS 637: Performed by: NURSE ANESTHETIST, CERTIFIED REGISTERED

## 2024-06-14 PROCEDURE — 258N000003 HC RX IP 258 OP 636: Mod: JZ | Performed by: NURSE ANESTHETIST, CERTIFIED REGISTERED

## 2024-06-14 PROCEDURE — 250N000009 HC RX 250

## 2024-06-14 PROCEDURE — 88304 TISSUE EXAM BY PATHOLOGIST: CPT | Mod: TC | Performed by: SURGERY

## 2024-06-14 PROCEDURE — 250N000009 HC RX 250: Performed by: SURGERY

## 2024-06-14 PROCEDURE — 710N000009 HC RECOVERY PHASE 1, LEVEL 1, PER MIN: Performed by: SURGERY

## 2024-06-14 PROCEDURE — 258N000003 HC RX IP 258 OP 636: Mod: JZ

## 2024-06-14 PROCEDURE — 360N000084 HC SURGERY LEVEL 4 W/ FLUORO, PER MIN: Performed by: SURGERY

## 2024-06-14 PROCEDURE — 250N000011 HC RX IP 250 OP 636: Mod: JZ

## 2024-06-14 PROCEDURE — 271N000001 HC OR GENERAL SUPPLY NON-STERILE: Performed by: SURGERY

## 2024-06-14 PROCEDURE — 999N000141 HC STATISTIC PRE-PROCEDURE NURSING ASSESSMENT: Performed by: SURGERY

## 2024-06-14 PROCEDURE — 250N000011 HC RX IP 250 OP 636: Mod: JZ | Performed by: NURSE ANESTHETIST, CERTIFIED REGISTERED

## 2024-06-14 RX ORDER — LIDOCAINE HYDROCHLORIDE AND EPINEPHRINE 10; 10 MG/ML; UG/ML
INJECTION, SOLUTION INFILTRATION; PERINEURAL PRN
Status: DISCONTINUED | OUTPATIENT
Start: 2024-06-14 | End: 2024-06-14 | Stop reason: HOSPADM

## 2024-06-14 RX ORDER — KETAMINE HYDROCHLORIDE 10 MG/ML
INJECTION INTRAMUSCULAR; INTRAVENOUS PRN
Status: DISCONTINUED | OUTPATIENT
Start: 2024-06-14 | End: 2024-06-14

## 2024-06-14 RX ORDER — ACETAMINOPHEN 325 MG/1
650 TABLET ORAL
Status: DISCONTINUED | OUTPATIENT
Start: 2024-06-14 | End: 2024-06-14 | Stop reason: HOSPADM

## 2024-06-14 RX ORDER — FENTANYL CITRATE 50 UG/ML
50 INJECTION, SOLUTION INTRAMUSCULAR; INTRAVENOUS EVERY 5 MIN PRN
Status: DISCONTINUED | OUTPATIENT
Start: 2024-06-14 | End: 2024-06-14 | Stop reason: HOSPADM

## 2024-06-14 RX ORDER — ACETAMINOPHEN 325 MG/1
975 TABLET ORAL ONCE
Status: COMPLETED | OUTPATIENT
Start: 2024-06-14 | End: 2024-06-14

## 2024-06-14 RX ORDER — SODIUM CHLORIDE, SODIUM LACTATE, POTASSIUM CHLORIDE, CALCIUM CHLORIDE 600; 310; 30; 20 MG/100ML; MG/100ML; MG/100ML; MG/100ML
INJECTION, SOLUTION INTRAVENOUS CONTINUOUS
Status: DISCONTINUED | OUTPATIENT
Start: 2024-06-14 | End: 2024-06-14 | Stop reason: HOSPADM

## 2024-06-14 RX ORDER — HEPARIN SODIUM 5000 [USP'U]/.5ML
5000 INJECTION, SOLUTION INTRAVENOUS; SUBCUTANEOUS
Status: DISCONTINUED | OUTPATIENT
Start: 2024-06-14 | End: 2024-06-14 | Stop reason: HOSPADM

## 2024-06-14 RX ORDER — DEXAMETHASONE SODIUM PHOSPHATE 4 MG/ML
4 INJECTION, SOLUTION INTRA-ARTICULAR; INTRALESIONAL; INTRAMUSCULAR; INTRAVENOUS; SOFT TISSUE
Status: DISCONTINUED | OUTPATIENT
Start: 2024-06-14 | End: 2024-06-14 | Stop reason: HOSPADM

## 2024-06-14 RX ORDER — NALOXONE HYDROCHLORIDE 0.4 MG/ML
0.1 INJECTION, SOLUTION INTRAMUSCULAR; INTRAVENOUS; SUBCUTANEOUS
Status: DISCONTINUED | OUTPATIENT
Start: 2024-06-14 | End: 2024-06-14 | Stop reason: HOSPADM

## 2024-06-14 RX ORDER — HYDROMORPHONE HCL IN WATER/PF 6 MG/30 ML
0.2 PATIENT CONTROLLED ANALGESIA SYRINGE INTRAVENOUS EVERY 5 MIN PRN
Status: DISCONTINUED | OUTPATIENT
Start: 2024-06-14 | End: 2024-06-14 | Stop reason: HOSPADM

## 2024-06-14 RX ORDER — LIDOCAINE HYDROCHLORIDE 20 MG/ML
INJECTION, SOLUTION INFILTRATION; PERINEURAL PRN
Status: DISCONTINUED | OUTPATIENT
Start: 2024-06-14 | End: 2024-06-14

## 2024-06-14 RX ORDER — TRAMADOL HYDROCHLORIDE 50 MG/1
50 TABLET ORAL EVERY 6 HOURS PRN
Qty: 10 TABLET | Refills: 0 | Status: SHIPPED | OUTPATIENT
Start: 2024-06-14 | End: 2024-06-17

## 2024-06-14 RX ORDER — ALBUTEROL SULFATE 90 UG/1
AEROSOL, METERED RESPIRATORY (INHALATION) PRN
Status: DISCONTINUED | OUTPATIENT
Start: 2024-06-14 | End: 2024-06-14

## 2024-06-14 RX ORDER — HYDROCODONE BITARTRATE AND ACETAMINOPHEN 5; 325 MG/1; MG/1
1 TABLET ORAL EVERY 6 HOURS PRN
Qty: 12 TABLET | Refills: 0 | Status: SHIPPED | OUTPATIENT
Start: 2024-06-14 | End: 2024-06-14

## 2024-06-14 RX ORDER — HYDROMORPHONE HCL IN WATER/PF 6 MG/30 ML
0.4 PATIENT CONTROLLED ANALGESIA SYRINGE INTRAVENOUS EVERY 5 MIN PRN
Status: DISCONTINUED | OUTPATIENT
Start: 2024-06-14 | End: 2024-06-14 | Stop reason: HOSPADM

## 2024-06-14 RX ORDER — PROPOFOL 10 MG/ML
INJECTION, EMULSION INTRAVENOUS PRN
Status: DISCONTINUED | OUTPATIENT
Start: 2024-06-14 | End: 2024-06-14

## 2024-06-14 RX ORDER — HYDROCODONE BITARTRATE AND ACETAMINOPHEN 5; 325 MG/1; MG/1
1 TABLET ORAL
Status: COMPLETED | OUTPATIENT
Start: 2024-06-14 | End: 2024-06-14

## 2024-06-14 RX ORDER — MAGNESIUM SULFATE HEPTAHYDRATE 40 MG/ML
2 INJECTION, SOLUTION INTRAVENOUS ONCE
Status: COMPLETED | OUTPATIENT
Start: 2024-06-14 | End: 2024-06-14

## 2024-06-14 RX ORDER — BUPIVACAINE HYDROCHLORIDE 5 MG/ML
INJECTION, SOLUTION PERINEURAL PRN
Status: DISCONTINUED | OUTPATIENT
Start: 2024-06-14 | End: 2024-06-14 | Stop reason: HOSPADM

## 2024-06-14 RX ORDER — ONDANSETRON 2 MG/ML
INJECTION INTRAMUSCULAR; INTRAVENOUS PRN
Status: DISCONTINUED | OUTPATIENT
Start: 2024-06-14 | End: 2024-06-14

## 2024-06-14 RX ORDER — LIDOCAINE 40 MG/G
CREAM TOPICAL
Status: DISCONTINUED | OUTPATIENT
Start: 2024-06-14 | End: 2024-06-14 | Stop reason: HOSPADM

## 2024-06-14 RX ORDER — FENTANYL CITRATE 50 UG/ML
INJECTION, SOLUTION INTRAMUSCULAR; INTRAVENOUS PRN
Status: DISCONTINUED | OUTPATIENT
Start: 2024-06-14 | End: 2024-06-14

## 2024-06-14 RX ORDER — INDOCYANINE GREEN AND WATER 25 MG
2.5 KIT INJECTION ONCE
Status: COMPLETED | OUTPATIENT
Start: 2024-06-14 | End: 2024-06-14

## 2024-06-14 RX ORDER — PROPOFOL 10 MG/ML
INJECTION, EMULSION INTRAVENOUS CONTINUOUS PRN
Status: DISCONTINUED | OUTPATIENT
Start: 2024-06-14 | End: 2024-06-14

## 2024-06-14 RX ORDER — DEXAMETHASONE SODIUM PHOSPHATE 4 MG/ML
INJECTION, SOLUTION INTRA-ARTICULAR; INTRALESIONAL; INTRAMUSCULAR; INTRAVENOUS; SOFT TISSUE PRN
Status: DISCONTINUED | OUTPATIENT
Start: 2024-06-14 | End: 2024-06-14

## 2024-06-14 RX ORDER — ONDANSETRON 2 MG/ML
4 INJECTION INTRAMUSCULAR; INTRAVENOUS EVERY 30 MIN PRN
Status: DISCONTINUED | OUTPATIENT
Start: 2024-06-14 | End: 2024-06-14 | Stop reason: HOSPADM

## 2024-06-14 RX ORDER — FENTANYL CITRATE 50 UG/ML
25 INJECTION, SOLUTION INTRAMUSCULAR; INTRAVENOUS EVERY 5 MIN PRN
Status: DISCONTINUED | OUTPATIENT
Start: 2024-06-14 | End: 2024-06-14 | Stop reason: HOSPADM

## 2024-06-14 RX ORDER — CEFAZOLIN SODIUM/WATER 2 G/20 ML
2 SYRINGE (ML) INTRAVENOUS
Status: COMPLETED | OUTPATIENT
Start: 2024-06-14 | End: 2024-06-14

## 2024-06-14 RX ORDER — HYDROXYZINE HYDROCHLORIDE 25 MG/1
25 TABLET, FILM COATED ORAL EVERY 6 HOURS PRN
Status: DISCONTINUED | OUTPATIENT
Start: 2024-06-14 | End: 2024-06-14 | Stop reason: HOSPADM

## 2024-06-14 RX ORDER — CEFAZOLIN SODIUM/WATER 2 G/20 ML
2 SYRINGE (ML) INTRAVENOUS SEE ADMIN INSTRUCTIONS
Status: DISCONTINUED | OUTPATIENT
Start: 2024-06-14 | End: 2024-06-14 | Stop reason: HOSPADM

## 2024-06-14 RX ORDER — SODIUM CHLORIDE, SODIUM LACTATE, POTASSIUM CHLORIDE, CALCIUM CHLORIDE 600; 310; 30; 20 MG/100ML; MG/100ML; MG/100ML; MG/100ML
INJECTION, SOLUTION INTRAVENOUS CONTINUOUS PRN
Status: DISCONTINUED | OUTPATIENT
Start: 2024-06-14 | End: 2024-06-14

## 2024-06-14 RX ORDER — ONDANSETRON 4 MG/1
4 TABLET, ORALLY DISINTEGRATING ORAL EVERY 30 MIN PRN
Status: DISCONTINUED | OUTPATIENT
Start: 2024-06-14 | End: 2024-06-14 | Stop reason: HOSPADM

## 2024-06-14 RX ADMIN — SODIUM CHLORIDE, POTASSIUM CHLORIDE, SODIUM LACTATE AND CALCIUM CHLORIDE 100 ML: 600; 310; 30; 20 INJECTION, SOLUTION INTRAVENOUS at 08:15

## 2024-06-14 RX ADMIN — ONDANSETRON 4 MG: 2 INJECTION INTRAMUSCULAR; INTRAVENOUS at 12:17

## 2024-06-14 RX ADMIN — DEXMEDETOMIDINE HYDROCHLORIDE 8 MCG: 100 INJECTION, SOLUTION INTRAVENOUS at 11:14

## 2024-06-14 RX ADMIN — FENTANYL CITRATE 50 MCG: 50 INJECTION INTRAMUSCULAR; INTRAVENOUS at 11:17

## 2024-06-14 RX ADMIN — FENTANYL CITRATE 50 MCG: 50 INJECTION INTRAMUSCULAR; INTRAVENOUS at 12:17

## 2024-06-14 RX ADMIN — FENTANYL CITRATE 50 MCG: 50 INJECTION INTRAMUSCULAR; INTRAVENOUS at 11:13

## 2024-06-14 RX ADMIN — FENTANYL CITRATE 50 MCG: 50 INJECTION INTRAMUSCULAR; INTRAVENOUS at 11:54

## 2024-06-14 RX ADMIN — FENTANYL CITRATE 50 MCG: 50 INJECTION INTRAMUSCULAR; INTRAVENOUS at 12:00

## 2024-06-14 RX ADMIN — HYDROCODONE BITARTRATE AND ACETAMINOPHEN 1 TABLET: 5; 325 TABLET ORAL at 13:48

## 2024-06-14 RX ADMIN — MIDAZOLAM 2 MG: 1 INJECTION INTRAMUSCULAR; INTRAVENOUS at 09:39

## 2024-06-14 RX ADMIN — ALBUTEROL SULFATE 6 PUFF: 90 AEROSOL, METERED RESPIRATORY (INHALATION) at 11:15

## 2024-06-14 RX ADMIN — Medication 2.5 MG: at 08:19

## 2024-06-14 RX ADMIN — SUGAMMADEX 200 MG: 100 INJECTION, SOLUTION INTRAVENOUS at 11:09

## 2024-06-14 RX ADMIN — ONDANSETRON 4 MG: 2 INJECTION INTRAMUSCULAR; INTRAVENOUS at 10:59

## 2024-06-14 RX ADMIN — MAGNESIUM SULFATE HEPTAHYDRATE 2 G: 40 INJECTION, SOLUTION INTRAVENOUS at 09:50

## 2024-06-14 RX ADMIN — LIDOCAINE HYDROCHLORIDE 100 MG: 20 INJECTION, SOLUTION INFILTRATION; PERINEURAL at 09:43

## 2024-06-14 RX ADMIN — PHENYLEPHRINE HYDROCHLORIDE 100 MCG: 10 INJECTION INTRAVENOUS at 10:29

## 2024-06-14 RX ADMIN — FENTANYL CITRATE 50 MCG: 50 INJECTION INTRAMUSCULAR; INTRAVENOUS at 12:24

## 2024-06-14 RX ADMIN — PHENYLEPHRINE HYDROCHLORIDE 200 MCG: 10 INJECTION INTRAVENOUS at 10:19

## 2024-06-14 RX ADMIN — SODIUM CHLORIDE, POTASSIUM CHLORIDE, SODIUM LACTATE AND CALCIUM CHLORIDE: 600; 310; 30; 20 INJECTION, SOLUTION INTRAVENOUS at 09:40

## 2024-06-14 RX ADMIN — PHENYLEPHRINE HYDROCHLORIDE 100 MCG: 10 INJECTION INTRAVENOUS at 10:16

## 2024-06-14 RX ADMIN — SODIUM CHLORIDE, POTASSIUM CHLORIDE, SODIUM LACTATE AND CALCIUM CHLORIDE: 600; 310; 30; 20 INJECTION, SOLUTION INTRAVENOUS at 09:39

## 2024-06-14 RX ADMIN — ACETAMINOPHEN 975 MG: 325 TABLET, FILM COATED ORAL at 08:05

## 2024-06-14 RX ADMIN — PROPOFOL 200 MG: 10 INJECTION, EMULSION INTRAVENOUS at 09:43

## 2024-06-14 RX ADMIN — ROCURONIUM BROMIDE 50 MG: 50 INJECTION, SOLUTION INTRAVENOUS at 09:43

## 2024-06-14 RX ADMIN — PROPOFOL 200 MCG/KG/MIN: 10 INJECTION, EMULSION INTRAVENOUS at 09:43

## 2024-06-14 RX ADMIN — Medication 2 G: at 09:39

## 2024-06-14 RX ADMIN — PHENYLEPHRINE HYDROCHLORIDE 100 MCG: 10 INJECTION INTRAVENOUS at 10:12

## 2024-06-14 RX ADMIN — FENTANYL CITRATE 100 MCG: 50 INJECTION INTRAMUSCULAR; INTRAVENOUS at 09:42

## 2024-06-14 RX ADMIN — DEXAMETHASONE SODIUM PHOSPHATE 4 MG: 4 INJECTION, SOLUTION INTRA-ARTICULAR; INTRALESIONAL; INTRAMUSCULAR; INTRAVENOUS; SOFT TISSUE at 11:22

## 2024-06-14 RX ADMIN — KETAMINE HYDROCHLORIDE 50 MG: 10 INJECTION INTRAMUSCULAR; INTRAVENOUS at 09:50

## 2024-06-14 RX ADMIN — DEXAMETHASONE SODIUM PHOSPHATE 4 MG: 4 INJECTION, SOLUTION INTRA-ARTICULAR; INTRALESIONAL; INTRAMUSCULAR; INTRAVENOUS; SOFT TISSUE at 09:50

## 2024-06-14 RX ADMIN — SODIUM CHLORIDE, POTASSIUM CHLORIDE, SODIUM LACTATE AND CALCIUM CHLORIDE: 600; 310; 30; 20 INJECTION, SOLUTION INTRAVENOUS at 11:14

## 2024-06-14 ASSESSMENT — ACTIVITIES OF DAILY LIVING (ADL)
ADLS_ACUITY_SCORE: 20
ADLS_ACUITY_SCORE: 21
ADLS_ACUITY_SCORE: 20

## 2024-06-14 NOTE — PROGRESS NOTES
Procedure/Imaging/Clinic: ERCP  Physician: Kristen  [email or web address]  Scope time: Provider average  Anesthesia: General  Dx: Bile duct stone  Tier:2  Location: [facility]  Patient communication letter header:ERCP (Endoscopic Retrograde Cholangiopancreatography)

## 2024-06-14 NOTE — PROGRESS NOTES
Per communication from Dr. Peterson:   Semi urgent outpatient ERCP for Tuesday   This gent just had his GB out today and IOC demonstrates a nonobstructing stone   Could we get him on my schedule for Tuesday with general ERCP.     Please assist in scheduling:     Procedure/Imaging/Clinic: ERCP  Physician: Kirsten  [email or web address]  Scope time: Provider average  Anesthesia: General  Dx: Bile duct stone  Tier:2  Location: [facility]  Patient communication letter header:ERCP (Endoscopic Retrograde Cholangiopancreatography)    Called to discuss with patient; LVM as patient is recovering from lap pacheco today.  Discussed plan to schedule ERCP on 24. Per Dr. Peterson, patient is aware. Provided with my clinic contact information to discuss further either this afternoon or Monday morning.     Patient will need a , someone to stay with them for 24 hours and should stay in town for 24 hours (within 45 min of Hospital) post procedure.    Patient will need a pre-op physical within 30 days of procedure. If outside [facility] system, will need physical faxed to number [phone]   If you do not get a preop physical, your procedure could be cancelled, patient voiced understanding*    Preop Plan: 6/10/24 Yue BOND    Does patient have any history of gastric bypass/gastric surgery/altered panc/bili anatomy? None noted in history.     Does patient have [employer] insurance? No    Med Review    Blood thinner -  None  ASA - None  Diabetic - None   Injectable or oral medications for weight loss - None    Patient Education r/t procedure: MyChart confirmation once scheduled.     A pre-op nurse will call 1-2 days prior to the procedure.    NPO/Prep: No solid food 8 hours prior to arrival at the hospital. Clear liquids okay until 2 hours prior to arrival.     Myrna Salomon RN, BSN  Care Coordinator  [facility]

## 2024-06-14 NOTE — ANESTHESIA CARE TRANSFER NOTE
Patient: Shon Sargent    Procedure: Procedure(s):  CHOLECYSTECTOMY, LAPAROSCOPIC with cholangiograms       Diagnosis: Biliary colic [K80.50]  Diagnosis Additional Information: No value filed.    Anesthesia Type:   General     Note:    Oropharynx: oropharynx clear of all foreign objects and spontaneously breathing  Level of Consciousness: drowsy  Oxygen Supplementation: face mask  Level of Supplemental Oxygen (L/min / FiO2): 10  Independent Airway: airway patency satisfactory and stable  Dentition: dentition unchanged  Vital Signs Stable: post-procedure vital signs reviewed and stable  Report to RN Given: handoff report given  Patient transferred to: PACU    Handoff Report: Identifed the Patient, Identified the Reponsible Provider, Reviewed the pertinent medical history, Discussed the surgical course, Reviewed Intra-OP anesthesia mangement and issues during anesthesia, Set expectations for post-procedure period and Allowed opportunity for questions and acknowledgement of understanding      Vitals:  Vitals Value Taken Time   /70 06/14/24 1300   Temp 35.4  C (95.72  F) 06/14/24 1158   Pulse 68 06/14/24 1307   Resp 14 06/14/24 1307   SpO2 94 % 06/14/24 1307   Vitals shown include unfiled device data.    Electronically Signed By: TEO Lazcano CRNA  June 14, 2024  1:16 PM

## 2024-06-14 NOTE — INTERVAL H&P NOTE
Patient endorses white colored stools after consult in clinic.  This lasted 2 days but has since resolved.  He had severe pain associated with this episode as well.  I advised intra-operative cholangiogram in addition to cholecystectomy.  Consent revised.  Discussed risks, benefits, alternatives with patient.    Clinical Conditions Present on Arrival:  Clinically Significant Risk Factors Present on Admission                      # Obesity: Estimated body mass index is 33.77 kg/m  as calculated from the following:    Height as of this encounter: 1.829 m (6').    Weight as of this encounter: 112.9 kg (249 lb).

## 2024-06-14 NOTE — OP NOTE
Procedure Date: June 14, 2024    Pre-operative Diagnosis: 1. Symptomatic cholelithiasis    Post-operative Diagnosis:    Choledocholithiasis  Intra-abdominal adhesions    Procedure Performed: Laparoscopic Cholecystectomy with intraoperative cholangiogram    Surgeon: Loc Kramer DO    Assistants: Shelton Morejon PA-C (Needed for retraction, suction, assistance with closure)    Anesthesia Type: General plus local    Findings: Critical view of safety, intra-abdominal adhesions (omental), choledocholithiasis    Estimated Blood Loss: 30 mL           Condition: Stable    Indications:   Mr. Shon Sargent presents with right upper abdominal pain after meals, and characteristics of cholelithiasis on routine ultrasound.  Patient reported white stools x 2 days which resolved 2 weeks prior.  We discussed performing cholangiogram intra-operatively today. He may benefit from cholecystectomy, and was advised of the indications, alternatives, benefits, and risks (including, but not limited to, bleeding, infection, conversion to open surgery, potential for bile leak or bile duct injury, MI, DVT/PE, or death). He understood the information and consented to the aforementioned operative procedure    Procedure: The patient was brought to the Operating Room and placed on the operating table in a supine position. After induction of general endotracheal anesthesia, the abdomen was prepped and draped in the usual sterile fashion. The abdomen was entered through an infraumbilical incision using an open Poppy approach, a 5mm trochar was delivered under direct visualization, and the abdomen was insufflated with CO2 gas to a pressure of 15mmHg. Standard laparoscopic cholecystectomy ports were placed in the epigastrium, right midclavicular line and right anterior axillary line. The gallbladder was identified and the fundus retracted superiorly, the gallbladder wall was quite thin and tore at the fundus, spilling bile which was promptly  suctioned free.  There were significant omental adhesions to the gallbaldder which were taken down sharply and bluntly where appropriate. The cystic duct and artery were carefully dissected to establish the critical view of safety.  The cystic artery was secured with 2 clips proximal, 1 distal but not divided. A cystic ductotomy was created after placing a single hemoclip proximal to prevent stone spill. A cholangiocatheter was introduced and cholangiography performed. The bile duct filled normally, and there was a smooth taper as contrast entered the duodenum immediately. There was a fixed filling defect in the mid common duct, this did not move with flushing of the cystic duct. The cholangiogram was terminated. The cystic duct was secured with two hemoclips proximal and one hemooclip distal, prior to division.  The cystic artery was divided.  The gallbladder was then dissected from the liver bed using electrocautery.  A small portion of the gallbladder remained as it was densely adhered midway up the gallbladder.  This was fulgurated after the case. The gallbladder was retrieved through the epigastric port, and the trochar replaced. Once pneumoperitoneum was reestablished, the gallbladder bed was inspected for bleeding and bile leak and no leak was noted.  Bleeding points were controlled with cautery and surgicel.  There were adhesions noted at the umbilicus and midway in the left abdomen, this created an opening and potential for closed loop obstruction.  These were easily taken down with blunt dissection and sharp dissection as appropriate.  A small umbilical mesh was noted and well incorporated.  The patient was positioned flat, irrigant solution was suctioned free, and pneumoperitoneum was relieved before removing the trocars.     An instrument count, including laparotomy pads, sponges and needles was performed and found to be correct. Fascial closure at the 5mm port site was accomplished with a single  0-Vicryl suture.  Skin edges were re-approximated with 4-0 Monocryl suture, and the wounds were all cleansed and dried prior to application of sterile glue. The patient tolerated the procedure well, was extubated without incident, and transferred to the PACU in stable condition.    Loc Kramer DO on 6/14/2024 at 11:39 AM

## 2024-06-14 NOTE — PROGRESS NOTES
[facility] DISCHARGE NOTE    Patient discharged to home at 2:19 PM via wheel chair. Accompanied by daughter and staff. Discharge instructions reviewed with patient and daughter, opportunity offered to ask questions. Prescriptions sent to patients preferred pharmacy. All belongings sent with patient.  Tramadol sent to preferred pharmacy. Daughter and patient in agreement with plan; if pain increases over the weekend to go to [facility], for an emergency ERCP.     Betzaida Morales RN

## 2024-06-14 NOTE — ANESTHESIA PROCEDURE NOTES
Airway       Patient location during procedure: OR       Procedure Start/Stop Times: 6/14/2024 9:45 AM  Staff -        CRNA: Fito Crowley APRN CRNA       Performed By: CRNAIndications and Patient Condition       Indications for airway management: kriss-procedural       Induction type:intravenous       Mask difficulty assessment: 1 - vent by mask    Final Airway Details       Final airway type: endotracheal airway       Successful airway: ETT - single  Endotracheal Airway Details        ETT size (mm): 7.5       Cuffed: yes       Successful intubation technique: video laryngoscopy       VL Blade Size: Abdalla 3       Grade View of Cords: 1       Adjucts: stylet       Position: Center       Measured from: gums/teeth       Secured at (cm): 24       Bite block used: None    Post intubation assessment        Placement verified by: capnometry, equal breath sounds and chest rise        Number of attempts at approach: 1       Number of other approaches attempted: 0       Secured with: tape       Ease of procedure: easy       Dentition: Intact and Unchanged    Medication(s) Administered   Medication Administration Time: 6/14/2024 9:45 AM

## 2024-06-14 NOTE — ANESTHESIA POSTPROCEDURE EVALUATION
Patient: Shon Sargent    Procedure: Procedure(s):  CHOLECYSTECTOMY, LAPAROSCOPIC with cholangiograms       Anesthesia Type:  General    Note:  Disposition: Outpatient   Postop Pain Control: Uneventful            Sign Out: Well controlled pain   PONV: No   Neuro/Psych: Uneventful            Sign Out: Acceptable/Baseline neuro status   Airway/Respiratory: Uneventful            Sign Out: Acceptable/Baseline resp. status   CV/Hemodynamics: Uneventful            Sign Out: Acceptable CV status; No obvious hypovolemia; No obvious fluid overload   Other NRE: NONE   DID A NON-ROUTINE EVENT OCCUR? No           Last vitals:  Vitals Value Taken Time   /70 06/14/24 1300   Temp 35.4  C (95.72  F) 06/14/24 1158   Pulse 68 06/14/24 1307   Resp 14 06/14/24 1307   SpO2 94 % 06/14/24 1307   Vitals shown include unfiled device data.    Electronically Signed By: TEO Lazcano CRNA  June 14, 2024  1:16 PM

## 2024-06-14 NOTE — PROGRESS NOTES
Per communication from Dr. Peterson:   Semi urgent outpatient ERCP for Tuesday   This gent just had his GB out today and IOC demonstrates a nonobstructing stone   Could we get him on my schedule for Tuesday with general ERCP.    Please assist in scheduling:     Procedure/Imaging/Clinic: ERCP  Physician: Kristen  [email or web address]  Scope time: Provider average  Anesthesia: General  Dx: Bile duct stone  Tier:2  Location: [facility]  Patient communication letter header:ERCP (Endoscopic Retrograde Cholangiopancreatography)

## 2024-06-14 NOTE — DISCHARGE INSTRUCTIONS
HOME CARE FOLLOWING LAPAROSCOPIC CHOLECYSTECTOMY      INCISIONAL CARE:  Replace the bandage over your incisions until all drainage stops, or if more comfortable to have in place.  If present, leave the steri-strips (white paper tapes) in place for 14 days after surgery.  If Dermabond (a type of skin glue) is present, leave in place until it wears/flakes off.     BATHING:  Avoid baths for 1 week after surgery.  Showers are okay.  You may wash your hair at any time.  Gently pat your incisions dry after bathing.    ACTIVITY:  Light Activity -- you may immediately be up and about as tolerated.  Driving -- you may drive when comfortable and off narcotic pain medications.  Light Work -- resume when comfortable off pain medications.  (If you can drive, you probably can work.)  Strenuous Work/Activity -- limit lifting to 20 pounds for 1 week.  Progressively increase with time.  Active Sports (running, biking, etc.) -- cautiously resume after 2 weeks.    DISCOMFORT:  Use pain medications as prescribed by your surgeon.  Take the pain medication with some food, when possible, to minimize side effects.  Intermittent use of ice packs at the incision sites may help during the first 48 hours.  Expect gradual improvement.  You may experience shoulder pain, which is due to the air placed within your abdomen during the procedure.  This is temporary and usually passes within 2 days.    DIET:  Drink plenty of fluids.  While taking pain medications, increase dietary fiber or add a fiber supplementation like Metamucil or Citrucel to help prevent constipation - a possible side effect of pain medications.  It is not uncommon to experience some bowel changes (loose stools or constipation) after surgery.  Your body has to adapt to you no longer having a gall bladder.  To help minimize this side effect, avoid fatty foods for the first week after surgery.  You may then slowly increase the amount of fatty foods in your diet.      NAUSEA:  If  nauseated from the anesthetic/pain meds; rest in bed, get up cautiously with assistance, and drink clear liquids (juice, tea, broth).    RETURN APPOINTMENT:  Schedule a follow-up visit 2-3 weeks post-op.  Office Phone:  [phone]     CONTACT US IF THE FOLLOWING DEVELOPS:   1. A fever that is above 101     2. If there is a large amount of drainage, bleeding, or swelling.   3. Severe pain that is not relieved by your prescription.   4. Drainage that is thick, cloudy, yellow, green or white.   5. Any other questions not answered by  Frequently Asked Questions  sheet.      FREQUENTLY ASKED QUESTIONS:    Q:  How should my incision look?    A:  Normally your incision will appear slightly swollen with light redness directly along the incision itself as it heals.  It may feel like a bump or ridge as the healing/scarring happens, and over time (3-4 months) this bump or ridge feeling should slowly go away.  In general, clear or pink watery drainage can be normal at first as your incision heals, but should decrease over time.    Q:  How do I know if my incision is infected?  A:  Look at your incision for signs of infection, like redness around the incision spreading to surrounding skin, or drainage of cloudy or foul-smelling drainage.  If you feel warm, check your temperature to see if you are running a fever.    **If any of these things occur, please notify the nurse at our office.  We may need you to come into the office for an incision check.      Q:  How do I take care of my incision?  A:  If you have a dressing in place - Starting the day after surgery, replace the dressing 1-2 times a day until there is no further drainage from the incision.  At that time, a dressing is no longer needed.  Try to minimize tape on the skin if irritation is occurring at the tape sites.  If you have significant irritation from tape on the skin, please call the office to discuss other method of dressing your incision.    Small pieces of  tape called  steri-strips  may be present directly overlying your incision; these may be removed 10 days after surgery unless otherwise specified by your surgeon.  If these tapes start to loosen at the ends, you may trim them back until they fall off or are removed.    A:  If you had  Dermabond  tissue glue used as a dressing (this causes your incision to look shiny with a clear covering over it) - This type of dressing wears off with time and does not require more dressings over the top unless it is draining around the glue as it wears off.  Do not apply ointments or lotions over the incisions until the glue has completely worn off.    Q:  There is a piece of tape or a sticky  lead  still on my skin.  Can I remove this?  A:  Sometimes the sticky  leads  used for monitoring during surgery or for evaluation in the emergency department are not all removed while you are in the hospital.  These sometimes have a tab or metal dot on them.  You can easily remove these on your own, like taking off a band-aid.  If there is a gel substance under the  lead , simply wipe/clean it off with a washcloth or paper towel.      Q:  What can I do to minimize constipation (very hard stools, or lack of stools)?  A:  Stay well hydrated.  Increase your dietary fiber intake or take a fiber supplement -with plenty of water.  Walk around frequently.  You may consider an over-the-counter stool-softener.  Your Pharmacist can assist you with choosing one that is stocked at your pharmacy.  Constipation is also one of the most common side effects of pain medication.  If you are using pain medication, be pro-active and try to PREVENT problems with constipation by taking the steps above BEFORE constipation becomes a problem.    Q:  What do I do if I need more pain medications?  A:  Call the office to receive refills.  Be aware that certain pain meds cannot be called into a pharmacy and actually require a paper prescription.  A change may be made in  your pain med as you progress thru your recovery period or if you have side effects to certain meds.    --Pain meds are NOT refilled after 5pm on weekdays, and NOT AT ALL on the weekends, so please look ahead to prevent problems.      Q:  Why am I having a hard time sleeping now that I am at home?  A:  Many medications you receive while you are in the hospital can impact your sleep for a number of days after your surgery/hospitalization.  Decreased level of activity and naps during the day may also make sleeping at night difficult.  Try to minimize day-time naps, and get up frequently during the day to walk around your home during your recovery time.  Sleep aides may be of some help, but are not recommended for long-term use.      Q:  I am having some back discomfort.  What should I do?  A:  This may be related to certain positioning that was required for your surgery, extended periods of time in bed, or other changes in your overall activity level.  You may try ice, heat, acetaminophen, or ibuprofen to treat this temporarily.  Note that many pain medications have acetaminophen in them and would state this on the prescription bottle.  Be sure not to exceed the maximum of 4000mg per day of acetaminophen.     **If the pain you are having does not resolve, is severe, or is a flare of back pain you have had on other occasions prior to surgery, please contact your primary physician for further recommendations or for an appointment to be examined at their office.    Q:  Why am I having headaches?  A:  Headaches can be caused by many things:  caffeine withdrawal, use of pain meds, dehydration, high blood pressure, lack of sleep, over-activity/exhaustion, flare-up of usual migraine headaches.  If you feel this is related to muscle tension (a band-like feeling around the head, or a pressure at the low-back of the head) you may try ice or heat to this area.  You may need to drink more fluids (try electrolyte drink like  Gatorade), rest, or take your usual migraine medications.   **If your headaches do not resolve, worsen, are accompanied by other symptoms, or if your blood pressure is high, please call your primary physician for recommendation and/or examination.    Q:  I am unable to urinate.  What do I do?  A:  A small percentage of people can have difficulty urinating initially after surgery.  This includes being able to urinate only a very small amount at a time and feeling discomfort or pressure in the very low abdomen.  This is called  urinary retention , and is actually an urgent situation.  Proceed to your nearest Emergency department for evaluation (not an Urgent Care Center).  Sometimes the bladder does not work correctly after certain medications you receive during surgery, or related to certain procedures.  You may need to have a catheter placed until your bladder recovers.  When planning to go to an Emergency department, it may help to call the ER to let them know you are coming in for this problem after a surgery.  This may help you get in quicker to be evaluated.  **If you have symptoms of a urinary tract infection, please contact your primary physician for the proper evaluation and treatment.          If you have other questions, please call the office Monday thru Friday between 8am and 5pm to discuss with the nurse.  #[phone]    There is a surgeon ON CALL on weekday evenings and over the weekend in case of urgent need only, and may be contacted at the same number.    If you are having an emergency, call 911 or proceed to your nearest emergency department.                              Same Day Surgery Discharge Instructions  Special Precautions After Surgery - Adult    It is not unusual to feel lightheaded or faint, up to 24 hours after surgery or while taking pain medication.  If you have these symptoms; sit for a few minutes before standing and have someone assist you when getting up.  You should rest and  relax for the next 24 hours and must have someone stay with you for at least 24 hours after your discharge.  DO NOT DRIVE any vehicle or operate mechanical equipment for 24 hours following the end of your surgery.  DO NOT DRIVE while taking narcotic pain medications that have been prescribed by your physician.  If you had a limb operated on, you must be able to use it fully to drive.  DO NOT drink alcoholic beverages for 24 hours following surgery or while taking prescription pain medication.  Drink clear liquids (apple juice, ginger ale, broth, 7-Up, etc.).  Progress to your regular diet as you feel able.  Any questions call your physician and do not make important decisions for 24 hours.    Nausea and Vomiting: Nausea and vomiting can occur any time after receiving anesthesia. If you experience nausea and vomiting we encourage you to move to a clear liquid diet and advance your diet as tolerated. If nausea and vomiting do not improve within 12 hours please call the surgeon or present to the Emergency department.     Break-through Bleeding: If your experience bleeding from your surgical site apply pressure and additional dressing per nurse instruction. For simple problems such as a saturated dressing, you may need to reinforce the dressing with more gauze and tape and put slight pressure on the site. If bleeding does not subside contact the surgeon or present to the Emergency Department.    Post-op Infection: If you develop a fever of 100.4 or greater, have pus like drainage, redness, swelling or severe pain at the surgical site not alleviated with pain medications; please contact the surgeon or present to the Emergency Department.     Medications:  975 mg Acetaminophen (Tylenol) @ 0800 am:  Next dose: 2:00 pm.  Ibuprofen (Motrin, Advil):  Next dose: Begin as needed for pain- Take with food to avoid upset stomach.  5 mg Norco given at 1:45 pm, Begin Tramadol as needed for pain- Take with food to avoid upset  stomach.  Follow the instructions on the bottle.  If your pain increases over weekend, please [facility] the [facility] for an ER[facility]. They are aware of this plan.   __________________________________________________________________________________________________________________________________  IMPORTANT NUMBERS:    [facility] [place] Number:  [phone], [phone]  Pharmacy:  [phone]  Same Day Surgery:  [phone], for general post-op questions call Monday - Thursday until 8:30 p.m., Fridays until 6:00 p.m.  Nurse Advice Line:  [phone]                                                                         [facility]:  [phone]

## 2024-06-17 NOTE — PROGRESS NOTES
Spoke with patient this morning. Reviewed plan for procedure and it's indication. Reviewed pre-procedure information. Patient is agreeable to move forward with procedure as planned. He will arrange a .     Myrna Salomon RN Care Coordinator

## 2024-06-18 ENCOUNTER — APPOINTMENT (OUTPATIENT)
Dept: GENERAL RADIOLOGY | Facility: CLINIC | Age: 64
End: 2024-06-18
Attending: INTERNAL MEDICINE
Payer: COMMERCIAL

## 2024-06-18 ENCOUNTER — HOSPITAL ENCOUNTER (OUTPATIENT)
Facility: CLINIC | Age: 64
Discharge: HOME | End: 2024-06-18
Attending: INTERNAL MEDICINE | Admitting: INTERNAL MEDICINE
Payer: COMMERCIAL

## 2024-06-18 ENCOUNTER — ANESTHESIA (OUTPATIENT)
Dept: SURGERY | Facility: CLINIC | Age: 64
End: 2024-06-18
Payer: COMMERCIAL

## 2024-06-18 ENCOUNTER — ANESTHESIA EVENT (OUTPATIENT)
Dept: SURGERY | Facility: CLINIC | Age: 64
End: 2024-06-18
Payer: COMMERCIAL

## 2024-06-18 VITALS
TEMPERATURE: 98 F | SYSTOLIC BLOOD PRESSURE: 139 MMHG | DIASTOLIC BLOOD PRESSURE: 99 MMHG | BODY MASS INDEX: 34.04 KG/M2 | WEIGHT: 243.17 LBS | HEIGHT: 71 IN | OXYGEN SATURATION: 93 % | HEART RATE: 72 BPM | RESPIRATION RATE: 16 BRPM

## 2024-06-18 LAB
ALBUMIN SERPL BCG-MCNC: 4.1 G/DL (ref 3.5–5.2)
ALP SERPL-CCNC: 135 U/L (ref 40–150)
ALT SERPL W P-5'-P-CCNC: 28 U/L (ref 0–70)
ANION GAP SERPL CALCULATED.3IONS-SCNC: 13 MMOL/L (ref 7–15)
AST SERPL W P-5'-P-CCNC: 39 U/L (ref 0–45)
BILIRUB SERPL-MCNC: 0.7 MG/DL
BUN SERPL-MCNC: 12.2 MG/DL (ref 8–23)
CALCIUM SERPL-MCNC: 9.6 MG/DL (ref 8.8–10.2)
CHLORIDE SERPL-SCNC: 105 MMOL/L (ref 98–107)
CREAT SERPL-MCNC: 0.93 MG/DL (ref 0.67–1.17)
DEPRECATED HCO3 PLAS-SCNC: 20 MMOL/L (ref 22–29)
EGFRCR SERPLBLD CKD-EPI 2021: >90 ML/MIN/1.73M2
ERCP: NORMAL
ERYTHROCYTE [DISTWIDTH] IN BLOOD BY AUTOMATED COUNT: 14 % (ref 10–15)
GLUCOSE SERPL-MCNC: 116 MG/DL (ref 70–99)
HCT VFR BLD AUTO: 46.3 % (ref 40–53)
HGB BLD-MCNC: 15.3 G/DL (ref 13.3–17.7)
INR PPP: 1.06 (ref 0.85–1.15)
LIPASE SERPL-CCNC: 26 U/L (ref 13–60)
MCH RBC QN AUTO: 27.7 PG (ref 26.5–33)
MCHC RBC AUTO-ENTMCNC: 33 G/DL (ref 31.5–36.5)
MCV RBC AUTO: 84 FL (ref 78–100)
PLATELET # BLD AUTO: 171 10E3/UL (ref 150–450)
POTASSIUM SERPL-SCNC: 4.2 MMOL/L (ref 3.4–5.3)
PROT SERPL-MCNC: 7.3 G/DL (ref 6.4–8.3)
RBC # BLD AUTO: 5.52 10E6/UL (ref 4.4–5.9)
SODIUM SERPL-SCNC: 138 MMOL/L (ref 135–145)
WBC # BLD AUTO: 8 10E3/UL (ref 4–11)

## 2024-06-18 PROCEDURE — 255N000002 HC RX 255 OP 636: Mod: JZ | Performed by: INTERNAL MEDICINE

## 2024-06-18 PROCEDURE — C1726 CATH, BAL DIL, NON-VASCULAR: HCPCS | Performed by: INTERNAL MEDICINE

## 2024-06-18 PROCEDURE — 80053 COMPREHEN METABOLIC PANEL: CPT | Performed by: INTERNAL MEDICINE

## 2024-06-18 PROCEDURE — 43274 ERCP DUCT STENT PLACEMENT: CPT | Performed by: ANESTHESIOLOGY

## 2024-06-18 PROCEDURE — 85014 HEMATOCRIT: CPT | Performed by: INTERNAL MEDICINE

## 2024-06-18 PROCEDURE — 710N000010 HC RECOVERY PHASE 1, LEVEL 2, PER MIN: Performed by: INTERNAL MEDICINE

## 2024-06-18 PROCEDURE — 36415 COLL VENOUS BLD VENIPUNCTURE: CPT | Performed by: INTERNAL MEDICINE

## 2024-06-18 PROCEDURE — 43274 ERCP DUCT STENT PLACEMENT: CPT | Performed by: NURSE ANESTHETIST, CERTIFIED REGISTERED

## 2024-06-18 PROCEDURE — 83690 ASSAY OF LIPASE: CPT | Performed by: INTERNAL MEDICINE

## 2024-06-18 PROCEDURE — 250N000009 HC RX 250: Performed by: INTERNAL MEDICINE

## 2024-06-18 PROCEDURE — 250N000009 HC RX 250: Performed by: NURSE ANESTHETIST, CERTIFIED REGISTERED

## 2024-06-18 PROCEDURE — 710N000012 HC RECOVERY PHASE 2, PER MINUTE: Performed by: INTERNAL MEDICINE

## 2024-06-18 PROCEDURE — 250N000011 HC RX IP 250 OP 636: Mod: JZ | Performed by: NURSE ANESTHETIST, CERTIFIED REGISTERED

## 2024-06-18 PROCEDURE — C1769 GUIDE WIRE: HCPCS | Performed by: INTERNAL MEDICINE

## 2024-06-18 PROCEDURE — 250N000025 HC SEVOFLURANE, PER MIN: Performed by: INTERNAL MEDICINE

## 2024-06-18 PROCEDURE — C1877 STENT, NON-COAT/COV W/O DEL: HCPCS | Performed by: INTERNAL MEDICINE

## 2024-06-18 PROCEDURE — 360N000083 HC SURGERY LEVEL 3 W/ FLUORO, PER MIN: Performed by: INTERNAL MEDICINE

## 2024-06-18 PROCEDURE — 258N000003 HC RX IP 258 OP 636: Mod: JZ | Performed by: NURSE ANESTHETIST, CERTIFIED REGISTERED

## 2024-06-18 PROCEDURE — 250N000011 HC RX IP 250 OP 636: Mod: JZ | Performed by: ANESTHESIOLOGY

## 2024-06-18 PROCEDURE — 999N000179 XR SURGERY CARM FLUORO LESS THAN 5 MIN W STILLS: Mod: TC

## 2024-06-18 PROCEDURE — 370N000017 HC ANESTHESIA TECHNICAL FEE, PER MIN: Performed by: INTERNAL MEDICINE

## 2024-06-18 PROCEDURE — 272N000001 HC OR GENERAL SUPPLY STERILE: Performed by: INTERNAL MEDICINE

## 2024-06-18 PROCEDURE — 85610 PROTHROMBIN TIME: CPT | Performed by: INTERNAL MEDICINE

## 2024-06-18 PROCEDURE — 999N000141 HC STATISTIC PRE-PROCEDURE NURSING ASSESSMENT: Performed by: INTERNAL MEDICINE

## 2024-06-18 DEVICE — A STERILE NON-BIOABSORBABLE TUBULAR DEVICE INTENDED TO BE IMPLANTED IN AN OBSTRUCTED PANCREATIC DUCT (E.G., DUE TO STRICTURE OR MALIGNANCY) TO MAINTAIN LUMINAL PATENCY; IT MAY ALSO BE INTENDED TO TREAT A WIDE VARIETY OF CONDITIONS IN PANCREATIC ENDOSCOPY (E.G., DRAIN PSEUDOCYST, TREAT FISTULA OR DUCT DISRUPTION). IT IS MADE ENTIRELY OF A SYNTHETIC POLYMER AND HAS A CONTINUOUS TUBE DESIGN WITH OR WITHOUT RETENTION FLANGES. THIS IS A SINGLE-USE DEVICE.
Type: IMPLANTABLE DEVICE | Site: PANCREATIC DUCT | Status: FUNCTIONAL
Brand: FREEMAN PANCREATIC FLEXI-STENT

## 2024-06-18 RX ORDER — OXYCODONE HYDROCHLORIDE 5 MG/1
5 TABLET ORAL
Status: DISCONTINUED | OUTPATIENT
Start: 2024-06-18 | End: 2024-06-18 | Stop reason: HOSPADM

## 2024-06-18 RX ORDER — FENTANYL CITRATE 50 UG/ML
25 INJECTION, SOLUTION INTRAMUSCULAR; INTRAVENOUS EVERY 5 MIN PRN
Status: DISCONTINUED | OUTPATIENT
Start: 2024-06-18 | End: 2024-06-18 | Stop reason: HOSPADM

## 2024-06-18 RX ORDER — ONDANSETRON 4 MG/1
4 TABLET, ORALLY DISINTEGRATING ORAL EVERY 30 MIN PRN
Status: DISCONTINUED | OUTPATIENT
Start: 2024-06-18 | End: 2024-06-18 | Stop reason: HOSPADM

## 2024-06-18 RX ORDER — NALOXONE HYDROCHLORIDE 0.4 MG/ML
0.1 INJECTION, SOLUTION INTRAMUSCULAR; INTRAVENOUS; SUBCUTANEOUS
Status: DISCONTINUED | OUTPATIENT
Start: 2024-06-18 | End: 2024-06-18 | Stop reason: HOSPADM

## 2024-06-18 RX ORDER — HYDROMORPHONE HCL IN WATER/PF 6 MG/30 ML
0.4 PATIENT CONTROLLED ANALGESIA SYRINGE INTRAVENOUS EVERY 5 MIN PRN
Status: DISCONTINUED | OUTPATIENT
Start: 2024-06-18 | End: 2024-06-18 | Stop reason: HOSPADM

## 2024-06-18 RX ORDER — SODIUM CHLORIDE, SODIUM LACTATE, POTASSIUM CHLORIDE, CALCIUM CHLORIDE 600; 310; 30; 20 MG/100ML; MG/100ML; MG/100ML; MG/100ML
INJECTION, SOLUTION INTRAVENOUS CONTINUOUS
Status: DISCONTINUED | OUTPATIENT
Start: 2024-06-18 | End: 2024-06-18 | Stop reason: HOSPADM

## 2024-06-18 RX ORDER — INDOMETHACIN 50 MG/1
SUPPOSITORY RECTAL PRN
Status: DISCONTINUED | OUTPATIENT
Start: 2024-06-18 | End: 2024-06-18 | Stop reason: HOSPADM

## 2024-06-18 RX ORDER — LIDOCAINE 40 MG/G
CREAM TOPICAL
Status: DISCONTINUED | OUTPATIENT
Start: 2024-06-18 | End: 2024-06-18 | Stop reason: HOSPADM

## 2024-06-18 RX ORDER — ONDANSETRON 2 MG/ML
INJECTION INTRAMUSCULAR; INTRAVENOUS PRN
Status: DISCONTINUED | OUTPATIENT
Start: 2024-06-18 | End: 2024-06-18

## 2024-06-18 RX ORDER — SODIUM CHLORIDE, SODIUM LACTATE, POTASSIUM CHLORIDE, CALCIUM CHLORIDE 600; 310; 30; 20 MG/100ML; MG/100ML; MG/100ML; MG/100ML
INJECTION, SOLUTION INTRAVENOUS CONTINUOUS PRN
Status: DISCONTINUED | OUTPATIENT
Start: 2024-06-18 | End: 2024-06-18

## 2024-06-18 RX ORDER — FENTANYL CITRATE 50 UG/ML
50 INJECTION, SOLUTION INTRAMUSCULAR; INTRAVENOUS EVERY 5 MIN PRN
Status: DISCONTINUED | OUTPATIENT
Start: 2024-06-18 | End: 2024-06-18 | Stop reason: HOSPADM

## 2024-06-18 RX ORDER — PROPOFOL 10 MG/ML
INJECTION, EMULSION INTRAVENOUS PRN
Status: DISCONTINUED | OUTPATIENT
Start: 2024-06-18 | End: 2024-06-18

## 2024-06-18 RX ORDER — DEXAMETHASONE SODIUM PHOSPHATE 4 MG/ML
4 INJECTION, SOLUTION INTRA-ARTICULAR; INTRALESIONAL; INTRAMUSCULAR; INTRAVENOUS; SOFT TISSUE
Status: DISCONTINUED | OUTPATIENT
Start: 2024-06-18 | End: 2024-06-18 | Stop reason: HOSPADM

## 2024-06-18 RX ORDER — ONDANSETRON 2 MG/ML
4 INJECTION INTRAMUSCULAR; INTRAVENOUS EVERY 30 MIN PRN
Status: DISCONTINUED | OUTPATIENT
Start: 2024-06-18 | End: 2024-06-18 | Stop reason: HOSPADM

## 2024-06-18 RX ORDER — OXYCODONE HYDROCHLORIDE 10 MG/1
10 TABLET ORAL
Status: DISCONTINUED | OUTPATIENT
Start: 2024-06-18 | End: 2024-06-18 | Stop reason: HOSPADM

## 2024-06-18 RX ORDER — DEXAMETHASONE SODIUM PHOSPHATE 4 MG/ML
INJECTION, SOLUTION INTRA-ARTICULAR; INTRALESIONAL; INTRAMUSCULAR; INTRAVENOUS; SOFT TISSUE PRN
Status: DISCONTINUED | OUTPATIENT
Start: 2024-06-18 | End: 2024-06-18

## 2024-06-18 RX ORDER — HYDROMORPHONE HCL IN WATER/PF 6 MG/30 ML
0.2 PATIENT CONTROLLED ANALGESIA SYRINGE INTRAVENOUS EVERY 5 MIN PRN
Status: DISCONTINUED | OUTPATIENT
Start: 2024-06-18 | End: 2024-06-18 | Stop reason: HOSPADM

## 2024-06-18 RX ORDER — LIDOCAINE HYDROCHLORIDE 20 MG/ML
INJECTION, SOLUTION INFILTRATION; PERINEURAL PRN
Status: DISCONTINUED | OUTPATIENT
Start: 2024-06-18 | End: 2024-06-18

## 2024-06-18 RX ORDER — INDOMETHACIN 50 MG/1
100 SUPPOSITORY RECTAL
Status: DISCONTINUED | OUTPATIENT
Start: 2024-06-18 | End: 2024-06-18 | Stop reason: HOSPADM

## 2024-06-18 RX ORDER — IOPAMIDOL 510 MG/ML
INJECTION, SOLUTION INTRAVASCULAR PRN
Status: DISCONTINUED | OUTPATIENT
Start: 2024-06-18 | End: 2024-06-18 | Stop reason: HOSPADM

## 2024-06-18 RX ADMIN — PHENYLEPHRINE HYDROCHLORIDE 200 MCG: 10 INJECTION INTRAVENOUS at 11:13

## 2024-06-18 RX ADMIN — PHENYLEPHRINE HYDROCHLORIDE 200 MCG: 10 INJECTION INTRAVENOUS at 11:08

## 2024-06-18 RX ADMIN — DEXAMETHASONE SODIUM PHOSPHATE 6 MG: 4 INJECTION, SOLUTION INTRA-ARTICULAR; INTRALESIONAL; INTRAMUSCULAR; INTRAVENOUS; SOFT TISSUE at 10:47

## 2024-06-18 RX ADMIN — Medication 100 MG: at 11:16

## 2024-06-18 RX ADMIN — SODIUM CHLORIDE, POTASSIUM CHLORIDE, SODIUM LACTATE AND CALCIUM CHLORIDE: 600; 310; 30; 20 INJECTION, SOLUTION INTRAVENOUS at 10:29

## 2024-06-18 RX ADMIN — PHENYLEPHRINE HYDROCHLORIDE 200 MCG: 10 INJECTION INTRAVENOUS at 10:47

## 2024-06-18 RX ADMIN — PROPOFOL 200 MG: 10 INJECTION, EMULSION INTRAVENOUS at 10:34

## 2024-06-18 RX ADMIN — ONDANSETRON 4 MG: 2 INJECTION INTRAMUSCULAR; INTRAVENOUS at 11:51

## 2024-06-18 RX ADMIN — MIDAZOLAM 2 MG: 1 INJECTION INTRAMUSCULAR; INTRAVENOUS at 10:24

## 2024-06-18 RX ADMIN — Medication 50 MG: at 10:35

## 2024-06-18 RX ADMIN — ONDANSETRON 4 MG: 2 INJECTION INTRAMUSCULAR; INTRAVENOUS at 11:13

## 2024-06-18 RX ADMIN — LIDOCAINE HYDROCHLORIDE 100 MG: 20 INJECTION, SOLUTION INFILTRATION; PERINEURAL at 10:33

## 2024-06-18 ASSESSMENT — ACTIVITIES OF DAILY LIVING (ADL)
ADLS_ACUITY_SCORE: 20
ADLS_ACUITY_SCORE: 21
ADLS_ACUITY_SCORE: 21
ADLS_ACUITY_SCORE: 20

## 2024-06-18 NOTE — ANESTHESIA PROCEDURE NOTES
Airway       Patient location during procedure: OR       Procedure Start/Stop Times: 6/18/2024 10:37 AM  Staff -        Anesthesiologist:  Mahin Puentes MD       CRNA: Mesha Ariza APRN CRNA       Performed By: CRNA  Consent for Airway        Urgency: elective  Indications and Patient Condition       Indications for airway management: kriss-procedural       Induction type:intravenous       Mask difficulty assessment: 1 - vent by mask    Final Airway Details       Final airway type: endotracheal airway       Successful airway: ETT - single  Endotracheal Airway Details        ETT size (mm): 7.5       Cuffed: yes       Successful intubation technique: direct laryngoscopy       DL Blade Type: MAC 4       Grade View of Cords: 1       Adjucts: stylet       Position: Right       Measured from: gums/teeth       Secured at (cm): 23       Bite Block used: Endo Green Bite Block.    Post intubation assessment        Placement verified by: capnometry, equal breath sounds and chest rise        Number of attempts at approach: 1       Number of other approaches attempted: 0       Secured with: pink tape       Ease of procedure: easy       Dentition: Intact and Unchanged    Medication(s) Administered   Medication Administration Time: 6/18/2024 10:37 AM

## 2024-06-18 NOTE — ANESTHESIA PREPROCEDURE EVALUATION
Anesthesia Pre-Procedure Evaluation    Patient: Shon Sargent   MRN: [id number] : 1960        Procedure : Procedure(s):  ENDOSCOPIC RETROGRADE CHOLANGIOPANCREATOGRAPHY          Past Medical History:   Diagnosis Date    Cancer (H)     ED (erectile dysfunction)     GERD (gastroesophageal reflux disease)     Gout     Hypertension     PONV (postoperative nausea and vomiting)     Prostate cancer (H)       Past Surgical History:   Procedure Laterality Date    CHOLECYSTECTOMY WITH CHOLANGIOGRAMS N/A 2024    Procedure: CHOLECYSTECTOMY, LAPAROSCOPIC with cholangiograms;  Surgeon: Loc Kramer DO;  Location: [facility] OR    COLONOSCOPY N/A 10/08/2015    Procedure: COLONOSCOPY;  Surgeon: Rolando Ayers MD;  Location: [facility]    DAVINCI PROSTATECTOMY, LYMPHADENECTOMY N/A 2024    Procedure: ROBOT-ASSISTED LAPAROSCOPIC RADICAL PROSTATECTOMY WITH BILATERAL PELVIC LYMPHADENECTOMY;  Surgeon: Elizabeth Diop MD;  Location:  OR    ESOPHAGOSCOPY, GASTROSCOPY, DUODENOSCOPY (EGD), COMBINED N/A 2014    Procedure: COMBINED ESOPHAGOSCOPY, GASTROSCOPY, DUODENOSCOPY (EGD);  Surgeon: Rolando Ayers MD;  Location: [facility]    ESOPHAGOSCOPY, GASTROSCOPY, DUODENOSCOPY (EGD), COMBINED N/A 10/08/2015    Procedure: COMBINED ESOPHAGOSCOPY, GASTROSCOPY, DUODENOSCOPY (EGD), BIOPSY SINGLE OR MULTIPLE;  Surgeon: Rolando Ayers MD;  Location: [facility]    HERNIA REPAIR  2007    umbilical    TOTAL HIP ARTHROPLASTY Right       Allergies   Allergen Reactions    Oxycodone Nausea and Vomiting    Valdecoxib Hives     Bextra      Social History     Tobacco Use    Smoking status: Never    Smokeless tobacco: Never   Substance Use Topics    Alcohol use: Not Currently     Comment: occas      Wt Readings from Last 1 Encounters:   24 112.9 kg (249 lb)        Anesthesia Evaluation   Pt has had prior anesthetic. Type: General.    History of anesthetic complications  - PONV.      ROS/MED HX  ENT/Pulmonary:       Neurologic:  - neg  "neurologic ROS     Cardiovascular:     (+) Dyslipidemia hypertension- -   -  - -                                 Previous cardiac testing   Echo: Date: Results:    Stress Test:  Date: Results:    ECG Reviewed:  Date: 06/10/24 Results:  Normal sinus rhythm  Cath:  Date: Results:      METS/Exercise Tolerance:     Hematologic:     (+)      anemia,          Musculoskeletal:       GI/Hepatic:     (+) GERD,                   Renal/Genitourinary:       Endo: Comment: Gout    (+)               Obesity,       Psychiatric/Substance Use:       Infectious Disease:       Malignancy:   (+) Malignancy, History of Prostate.    Other:            Physical Exam    Airway        Mallampati: III   TM distance: > 3 FB   Neck ROM: full   Mouth opening: > 3 cm    Respiratory Devices and Support         Dental       (+) Modest Abnormalities - crowns, retainers, 1 or 2 missing teeth      Cardiovascular   cardiovascular exam normal          Pulmonary   pulmonary exam normal                OUTSIDE LABS:  CBC:   Lab Results   Component Value Date    WBC 5.4 06/10/2024    WBC 10.3 04/21/2024    HGB 14.1 06/10/2024    HGB 14.8 04/21/2024    HCT 44.7 06/10/2024    HCT 45.0 04/21/2024     06/10/2024     04/21/2024     BMP:   Lab Results   Component Value Date     06/10/2024     04/21/2024    POTASSIUM 4.4 06/10/2024    POTASSIUM 3.8 04/21/2024    CHLORIDE 106 06/10/2024    CHLORIDE 103 04/21/2024    CO2 22 06/10/2024    CO2 21 (L) 04/21/2024    BUN 15.3 06/10/2024    BUN 12.4 04/21/2024    CR 0.90 06/10/2024    CR 0.89 04/21/2024     (H) 06/10/2024     (H) 04/21/2024     COAGS:   Lab Results   Component Value Date    INR 1.03 09/02/2019     POC: No results found for: \"BGM\", \"HCG\", \"HCGS\"  HEPATIC:   Lab Results   Component Value Date    ALBUMIN 4.4 04/21/2024    PROTTOTAL 7.4 04/21/2024    ALT 23 04/21/2024    AST 24 04/21/2024    ALKPHOS 114 04/21/2024    BILITOTAL 0.5 04/21/2024     OTHER:   Lab Results "   Component Value Date    LACT 1.5 04/21/2024    A1C 6.1 (H) 06/10/2024    ALIX 9.1 06/10/2024    LIPASE 24 04/21/2024       Anesthesia Plan    ASA Status:  3    NPO Status:  NPO Appropriate    Anesthesia Type: General.     - Airway: ETT   Induction: Intravenous.   Maintenance: Inhalation.        Consents    Anesthesia Plan(s) and associated risks, benefits, and realistic alternatives discussed. Questions answered and patient/representative(s) expressed understanding.     - Discussed:     - Discussed with:  Patient      - Extended Intubation/Ventilatory Support Discussed: No.      - Patient is DNR/DNI Status: No     Use of blood products discussed: No .     Postoperative Care    Pain management: Multi-modal analgesia.   PONV prophylaxis: Ondansetron (or other 5HT-3), Dexamethasone or Solumedrol     Comments:               Keerthi Obrien MD    I have reviewed the pertinent notes and labs in the chart from the past 30 days and (re)examined the patient.  Any updates or changes from those notes are reflected in this note.              # Obesity: Estimated body mass index is 33.77 kg/m  as calculated from the following:    Height as of 6/14/24: 1.829 m (6').    Weight as of 6/14/24: 112.9 kg (249 lb).

## 2024-06-18 NOTE — ANESTHESIA POSTPROCEDURE EVALUATION
Patient: Shon Sargent    Procedure: Procedure(s):  ENDOSCOPIC RETROGRADE CHOLANGIOPANCREATOGRAPHY WITH BILIARY SPHINCTEROTOMY AND STONE REMOVAL, PANCREATIC DUCT STENT PLACEMENT       Anesthesia Type:  General    Note:  Disposition: Outpatient   Postop Pain Control: Uneventful            Sign Out: Well controlled pain   PONV: No   Neuro/Psych: Uneventful            Sign Out: Acceptable/Baseline neuro status   Airway/Respiratory: Uneventful            Sign Out: Acceptable/Baseline resp. status   CV/Hemodynamics: Uneventful            Sign Out: Acceptable CV status; No obvious hypovolemia; No obvious fluid overload   Other NRE: NONE   DID A NON-ROUTINE EVENT OCCUR? No       Last vitals:  Vitals Value Taken Time   /99 06/18/24 1145   Temp 36.3  C (97.3  F) 06/18/24 1130   Pulse 73 06/18/24 1153   Resp 22 06/18/24 1153   SpO2 97 % 06/18/24 1153   Vitals shown include unfiled device data.    Electronically Signed By: Mahin Puentes MD  June 18, 2024  11:54 AM

## 2024-06-18 NOTE — ANESTHESIA CARE TRANSFER NOTE
Patient: Shon Sargent    Procedure: Procedure(s):  ENDOSCOPIC RETROGRADE CHOLANGIOPANCREATOGRAPHY WITH BILIARY SPHINCTEROTOMY AND STONE REMOVAL, PANCREATIC DUCT STENT PLACEMENT       Diagnosis: Bile duct stone [K80.50]  Diagnosis Additional Information: No value filed.    Anesthesia Type:   General     Note:    Oropharynx: spontaneously breathing and oropharynx clear of all foreign objects  Level of Consciousness: awake  Oxygen Supplementation: nasal cannula  Level of Supplemental Oxygen (L/min / FiO2): 4  Independent Airway: airway patency satisfactory and stable  Dentition: dentition unchanged  Vital Signs Stable: post-procedure vital signs reviewed and stable  Report to RN Given: handoff report given  Patient transferred to: PACU    Handoff Report: Identifed the Patient, Identified the Reponsible Provider, Reviewed the pertinent medical history, Discussed the surgical course, Reviewed Intra-OP anesthesia mangement and issues during anesthesia, Set expectations for post-procedure period and Allowed opportunity for questions and acknowledgement of understanding      Vitals:  Vitals Value Taken Time   /88 06/18/24 1130   Temp     Pulse 65 06/18/24 1131   Resp 13 06/18/24 1131   SpO2 99 % 06/18/24 1131   Vitals shown include unfiled device data.    Electronically Signed By: TEO Moeller CRNA  June 18, 2024  11:32 AM

## 2024-06-18 NOTE — DISCHARGE INSTRUCTIONS
Contacting your Doctor -   To contact a doctor, call Dr Peterson's clinic at [phone]  or:  [phone] and ask for the resident on call for Gastroenterology (answered 24 hours a day)   Emergency Department:  [facility]: [phone]  [facility]: [phone] 911 if you are in need of immediate or emergent help

## 2024-06-20 LAB
PATH REPORT.COMMENTS IMP SPEC: NORMAL
PATH REPORT.COMMENTS IMP SPEC: NORMAL
PATH REPORT.FINAL DX SPEC: NORMAL
PATH REPORT.GROSS SPEC: NORMAL
PATH REPORT.MICROSCOPIC SPEC OTHER STN: NORMAL
PATH REPORT.RELEVANT HX SPEC: NORMAL
PHOTO IMAGE: NORMAL

## 2024-06-21 ENCOUNTER — CARE COORDINATION (OUTPATIENT)
Dept: GASTROENTEROLOGY | Facility: CLINIC | Age: 64
End: 2024-06-21

## 2024-06-21 DIAGNOSIS — Z46.89 ENCOUNTER FOR REMOVAL OF PANCREATIC STENT: Primary | ICD-10-CM

## 2024-06-21 NOTE — PROGRESS NOTES
Post ERCP with Dr. Peterson 6-18-24  Plain film in 4w to ensure spontaneous ejection of   the pancreatic stent     Imaging ordered, Mychart sent    ML  
Site: Sternum (01 Jul 2022 09:45)  Bilirubin: 5.8 (01 Jul 2022 09:45)  Bilirubin Comment: D/C TCB low risk (01 Jul 2022 09:45)  Bilirubin: 6 (01 Jul 2022 02:00)  Bilirubin Comment: TCB @60HOL-low wisk as per [employer] (01 Jul 2022 02:00)  Site: Forehead (01 Jul 2022 02:00)  Site: Forehead (30 Jun 2022 13:45)  Bilirubin: 4.6 (30 Jun 2022 13:45)  Bilirubin Comment: 48hr of life low risk (30 Jun 2022 13:45)  Bilirubin Comment: TCB @36HOL low risk as per bilitool (30 Jun 2022 02:00)  Site: Sternum (30 Jun 2022 02:00)  Bilirubin: 4.4 (30 Jun 2022 02:00)  Bilirubin: 2.3 (29 Jun 2022 14:15)  Site: Forehead (29 Jun 2022 14:15)  Bilirubin Comment: TCB @ 24hrs - 2.3 - lowrisk (29 Jun 2022 14:15)  Bilirubin Comment: TCB @ 17 hrs - Low risk (29 Jun 2022 06:30)  Bilirubin: 2.3 (29 Jun 2022 06:30)  Site: Forehead (29 Jun 2022 06:30)

## 2024-07-02 ENCOUNTER — OFFICE VISIT (OUTPATIENT)
Dept: SURGERY | Facility: CLINIC | Age: 64
End: 2024-07-02
Payer: COMMERCIAL

## 2024-07-02 VITALS
WEIGHT: 243 LBS | BODY MASS INDEX: 34.02 KG/M2 | SYSTOLIC BLOOD PRESSURE: 136 MMHG | HEART RATE: 85 BPM | HEIGHT: 71 IN | OXYGEN SATURATION: 96 % | DIASTOLIC BLOOD PRESSURE: 84 MMHG

## 2024-07-02 DIAGNOSIS — Z90.49 S/P CHOLECYSTECTOMY: Primary | ICD-10-CM

## 2024-07-02 PROCEDURE — 99024 POSTOP FOLLOW-UP VISIT: CPT | Performed by: PHYSICIAN ASSISTANT

## 2024-07-02 NOTE — NURSING NOTE
"Chief Complaint   Patient presents with    Surgical Followup     Postop/Lap pacheco       Vitals:    07/02/24 0959   Weight: 110.2 kg (243 lb)   Height: 1.803 m (5' 11\")     Wt Readings from Last 1 Encounters:   07/02/24 110.2 kg (243 lb)     Helene Logan MA      "

## 2024-07-02 NOTE — LETTER
"7/2/2024      Shon Sargent  [address]  [facility] [address]      Dear Colleague,    Thank you for referring your patient, Shon Sargent, to the [facility]. Please see a copy of my visit note below.    Surgery Post-op Note  [facility]  [address] [place] [address]  T: [phone]  F: [phone]    Subjective:     Shon Sargent presents to the clinic after undergoing laparoscopic cholecystectomy with cholangiogram with Dr. Kramer, then ERCP with Dr. Peterson on 6/18/2024.  Patient is here for follow up. The patient reports no incisional pain.  Patient is currently tolerating a regular diet.  Patient states bowel habits  are normal and  soft. Patient denies significant nausea or vomiting. He is feel much better.         Objective:     Vitals:   /84   Pulse 85   Ht 1.803 m (5' 11\")   Wt 110.2 kg (243 lb)   SpO2 96%   BMI 33.89 kg/m     General Appearance:    Shon is a well-appearing 63 year old  male who is in no acute distress.   Cardiac:    Skin is warm and dry     Pulmonary:   Regular rate, rhythm. Breathing is nonlabored on room air.    Abdomen:    Soft, nontender, nondistended. Incisions are c/d/I.    Incision: The incision site is healing  well. There are no signs of cellulitis or drainage.        Labs/Imaging:     No new labs or imaging.        Pathology:     Final Diagnosis   Gallbladder, cholecystectomy:  -Chronic cholecystitis with cholelithiasis  -Foci of low-grade biliary intraepithelial neoplasia, not at margin; no evidence of high-grade dysplasia or malignancy     Discussed pathology with Dr. Kramer. Patient can follow up with PCP regarding pathology. Due to no margins and removal of lesion, Dr. Kramer is not recommending any additional intervention.      Assessment:     Mr.Kurt CHARIS Sargent is status post laparoscopic cholecystectomy with cholangiogram and ERCP, doing well .        Plan:     1. The patient is instructed to call " the office if there is any increasing incisional pain, swelling, redness, drainage, or any other problems.   2. Follow up if any new or worsening symptoms, questions or concern. Follow up with PCP regarding pathology if any further questions or referrals needed.        Again, thank you for allowing me to participate in the care of your patient.        Sincerely,        BEAN CAMACHO PA-C

## 2024-07-11 ENCOUNTER — VIRTUAL VISIT (OUTPATIENT)
Dept: FAMILY MEDICINE | Facility: CLINIC | Age: 64
End: 2024-07-11
Payer: COMMERCIAL

## 2024-07-11 DIAGNOSIS — D49.9 PRECANCEROUS LESION: Primary | ICD-10-CM

## 2024-07-11 PROCEDURE — 99213 OFFICE O/P EST LOW 20 MIN: CPT | Mod: 95 | Performed by: NURSE PRACTITIONER

## 2024-07-11 NOTE — PROGRESS NOTES
Shon is a 63 year old who is being evaluated via a billable video visit.    How would you like to obtain your AVS? MyChart  If the video visit is dropped, the invitation should be resent by: Send to e-mail at: [email or web address] [employer]  Will anyone else be joining your video visit? No      Assessment & Plan     Precancerous lesion  Patient has history of prostate cancer noted to have precancerous lesions in the gallbladder referral to oncology gastroenterology was placed today as he did have follow-up with general surgery.  Recommend patient follow-up with gastroenterology oncology they will contact to schedule appointment  - Adult Oncology/Hematology  Referral; Future            See Patient Instructions    Subjective   Shon is a 63 year old, presenting for the following health issues:  RECHECK (Gall bladder and prostate surgery and results, 06/2024)        7/11/2024     1:24 PM   Additional Questions   Roomed by Michaela OLSON   Accompanied by Self     History of Present Illness       Reason for visit:  Descuss options or treatmentHe consumes 2 sweetened beverage(s) daily.He exercises with enough effort to increase his heart rate 10 to 19 minutes per day.  He exercises with enough effort to increase his heart rate 3 or less days per week.   He is taking medications regularly.               Review of Systems  Constitutional, HEENT, cardiovascular, pulmonary, gi and gu systems are negative, except as otherwise noted.      Objective           Vitals:  No vitals were obtained today due to virtual visit.    Physical Exam   GENERAL: alert and no distress  EYES: Eyes grossly normal to inspection.  No discharge or erythema, or obvious scleral/conjunctival abnormalities.  RESP: No audible wheeze, cough, or visible cyanosis.    SKIN: Visible skin clear. No significant rash, abnormal pigmentation or lesions.  NEURO: Cranial nerves grossly intact.  Mentation and speech appropriate for age.  PSYCH: Appropriate affect, tone, and pace  of words    No results found for any visits on 07/11/24.      Video-Visit Details    Type of service:  Video Visit   Originating Location (pt. Location): Home    Distant Location (provider location):  On-site  Platform used for Video Visit: Earl  Signed Electronically by: TEO Roe CNP

## 2024-07-16 ENCOUNTER — PATIENT OUTREACH (OUTPATIENT)
Dept: ONCOLOGY | Facility: CLINIC | Age: 64
End: 2024-07-16

## 2024-07-16 ENCOUNTER — ANCILLARY PROCEDURE (OUTPATIENT)
Dept: GENERAL RADIOLOGY | Facility: CLINIC | Age: 64
End: 2024-07-16
Attending: INTERNAL MEDICINE
Payer: COMMERCIAL

## 2024-07-16 DIAGNOSIS — Z46.89 ENCOUNTER FOR REMOVAL OF PANCREATIC STENT: ICD-10-CM

## 2024-07-16 PROCEDURE — 74018 RADEX ABDOMEN 1 VIEW: CPT | Mod: TC | Performed by: RADIOLOGY

## 2024-07-16 NOTE — PROGRESS NOTES
New Patient Oncology Nurse Navigator Note     Referring provider: Jelly Boswell NP     Referring Clinic/Organization: [facility]     Referred to: Hepatobiliary Surgery      Requested provider (if applicable): First available provider    Referral Received: 07/16/24       Evaluation for :  High risk feature on cholecystectomy pathology   - evaluation for need of any further surgical intervention.      Clinical History (per Nurse review of records provided):      See book marked documents:     Referring MD office note  Pathology report  Imaging reports   Procedure report       Allergies   Allergen Reactions    Oxycodone Nausea and Vomiting    Valdecoxib Hives     Bextra        Past Medical History:   Diagnosis Date    Cancer (H)     ED (erectile dysfunction)     GERD (gastroesophageal reflux disease)     Gout     Hypertension     PONV (postoperative nausea and vomiting)     Prostate cancer (H)        Past Surgical History:   Procedure Laterality Date    CHOLECYSTECTOMY WITH CHOLANGIOGRAMS N/A 6/14/2024    Procedure: CHOLECYSTECTOMY, LAPAROSCOPIC with cholangiograms;  Surgeon: Loc Kramer DO;  Location: [facility] OR    COLONOSCOPY N/A 10/08/2015    Procedure: COLONOSCOPY;  Surgeon: Rolando Ayers MD;  Location: [facility] GI    DAVINCI PROSTATECTOMY, LYMPHADENECTOMY N/A 2/29/2024    Procedure: ROBOT-ASSISTED LAPAROSCOPIC RADICAL PROSTATECTOMY WITH BILATERAL PELVIC LYMPHADENECTOMY;  Surgeon: Elizabeth Diop MD;  Location:  OR    ENDOSCOPIC RETROGRADE CHOLANGIOPANCREATOGRAM N/A 6/18/2024    Procedure: ENDOSCOPIC RETROGRADE CHOLANGIOPANCREATOGRAPHY WITH BILIARY SPHINCTEROTOMY AND STONE REMOVAL, PANCREATIC DUCT STENT PLACEMENT;  Surgeon: Fernando Peterson MD;  Location:  OR    ESOPHAGOSCOPY, GASTROSCOPY, DUODENOSCOPY (EGD), COMBINED N/A 11/03/2014    Procedure: COMBINED ESOPHAGOSCOPY, GASTROSCOPY, DUODENOSCOPY (EGD);  Surgeon: Rolando Ayers MD;  Location: [facility]    ESOPHAGOSCOPY, GASTROSCOPY, DUODENOSCOPY (EGD),  COMBINED N/A 10/08/2015    Procedure: COMBINED ESOPHAGOSCOPY, GASTROSCOPY, DUODENOSCOPY (EGD), BIOPSY SINGLE OR MULTIPLE;  Surgeon: Rolando Ayers MD;  Location: [facility] GI    HERNIA REPAIR  01/01/2007    umbilical    TOTAL HIP ARTHROPLASTY Right 2021       Current Outpatient Medications   Medication Sig Dispense Refill    acetaminophen (TYLENOL) 500 MG tablet Take 1,000 mg by mouth as needed for mild pain      allopurinol (ZYLOPRIM) 300 MG tablet Take 1 tablet (300 mg) by mouth every morning 90 tablet 3    amLODIPine (NORVASC) 5 MG tablet Take 1 tablet (5 mg) by mouth daily 90 tablet 3    lisinopril (ZESTRIL) 30 MG tablet Take 1 tablet (30 mg) by mouth every morning (Patient taking differently: Take 600 mg by mouth every morning) 90 tablet 3    omeprazole (PRILOSEC) 20 MG DR capsule Take 1 capsule (20 mg) by mouth every morning 90 capsule 3    VENTOLIN  (90 Base) MCG/ACT inhaler INHALE 2 PUFFS BY MOUTH EVERY 6 HOURS AS NEEDED FOR SHORTNESS OF BREATH /DYSPNEA  OR  WHEEZING 18 g 0        Patient Active Problem List   Diagnosis    Essential hypertension with goal blood pressure less than 140/90    Tremor    Gout    ED (erectile dysfunction)    Psoriasis    Esophageal reflux (GERD)    CARDIOVASCULAR SCREENING; LDL GOAL LESS THAN 130    Iron deficiency anemia    Class 2 severe obesity due to excess calories with serious comorbidity in adult (H)    Post-operative state         Clinical Assessment / Barriers to Care (Per Nurse):    None at this time.     Records Location:     [facility]     Records Needed:     NONE AT THIS TIME      Additional testing needed prior to consult:     NONE AT THIS TIME    Referral updates and Plan:       Consult with Surgical Oncology       Teresa Dillon RN, BSN   Surgical Oncology New Patient Nurse Navigator  [facility]  [phone]

## 2024-07-18 NOTE — PROGRESS NOTES
"Surgery Post-op Note  [facility]  [address] [address]  T: [phone]  F: [phone]    Subjective:     Shon Sargent presents to the clinic after undergoing laparoscopic cholecystectomy with cholangiogram with Dr. Kramer, then ERCP with Dr. Peterson on 6/18/2024.  Patient is here for follow up. The patient reports no incisional pain.  Patient is currently tolerating a regular diet.  Patient states bowel habits  are normal and  soft. Patient denies significant nausea or vomiting. He is feel much better.         Objective:     Vitals:   /84   Pulse 85   Ht 1.803 m (5' 11\")   Wt 110.2 kg (243 lb)   SpO2 96%   BMI 33.89 kg/m     General Appearance:    Shon is a well-appearing 63 year old  male who is in no acute distress.   Cardiac:    Skin is warm and dry     Pulmonary:   Regular rate, rhythm. Breathing is nonlabored on room air.    Abdomen:    Soft, nontender, nondistended. Incisions are c/d/I.    Incision: The incision site is healing  well. There are no signs of cellulitis or drainage.        Labs/Imaging:     No new labs or imaging.        Pathology:     Final Diagnosis   Gallbladder, cholecystectomy:  -Chronic cholecystitis with cholelithiasis  -Foci of low-grade biliary intraepithelial neoplasia, not at margin; no evidence of high-grade dysplasia or malignancy     Discussed pathology with Dr. Kramer. Patient can follow up with PCP regarding pathology. Due to no margins and removal of lesion, Dr. Kramer is not recommending any additional intervention.      Assessment:     Mr.Kurt CHARIS Sargent is status post laparoscopic cholecystectomy with cholangiogram and ERCP, doing well .        Plan:     1. The patient is instructed to call the office if there is any increasing incisional pain, swelling, redness, drainage, or any other problems.   2. Follow up if any new or worsening symptoms, questions or concern. Follow up with PCP regarding pathology if any " further questions or referrals needed.

## 2024-08-09 NOTE — TELEPHONE ENCOUNTER
RECORDS STATUS - ALL OTHER DIAGNOSIS      RECORDS RECEIVED FROM: [facility]   NOTES STATUS DETAILS   OFFICE NOTE from referring provider EPic 7/11/24: TEO Hallman CNP   OPERATIVE REPORT Epic 6/14/24: CHOLECYSTECTOMY   MEDICATION LIST [facility]    LABS     PATHOLOGY REPORTS Report in Epic 6/14/24: [id number]    ANYTHING RELATED TO DIAGNOSIS Epic Most recent 6/18/24   IMAGING (NEED IMAGES & REPORT)     XRAYS PACS 7/16/24: XR Abd   ULTRASOUND PACS 4/21/24: US Abd

## 2024-08-12 NOTE — PROGRESS NOTES
"Surgical Oncology - New Patient  8/13/2024    63 M s/p laparoscopic partial cholecystectomy (small portion of hepatic bed gallbladder wall left in place an fulgurated per the operative report) for chronic cholecystitis and choledocholithiasis 6/14/2024.  He had subsequent ERCP with stone clearance of the CBD 6/18/2024.  Final pathology on the gallbladder showed chronic cholecystitis with cholelithiasis and foci of low grade biliary intraepithelial neoplasia (not at margins) without evidence of high grade dysplasia or malignancy.  Given this finding, the patient has been referred for recommendations on management.    Of Note: The patient is feeling well since his surgery.  He is worried about the findings in his gallbladder.  He does not have significant cardiac or pulmonary problems.  No blood thinning medications.  Active and independent without the use of assistive devices.    BP (!) 147/90 (BP Location: Right arm, Patient Position: Sitting, Cuff Size: Adult Large)   Pulse 82   Temp 98.6  F (37  C) (Oral)   Resp 16   Ht 1.791 m (5' 10.51\")   Wt 110.4 kg (243 lb 6.4 oz)   SpO2 94%   BMI 34.42 kg/m      Surgical Pathology (6/14/2024):  Gallbladder, cholecystectomy:  - Chronic cholecystitis with cholelithiasis.  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    No recent labs and no significant abnormalities on his last set in June 2024.    Assessment/Plan:  63 M s/p laparoscopic partial cholecystectomy (small portion of hepatic bed gallbladder wall left in place and fulgurated per the operative report) for chronic cholecystitis and choledocholithiasis 6/14/2024.  He had subsequent ERCP with stone clearance of the CBD 6/18/2024.  Final pathology on the gallbladder showed chronic cholecystitis with cholelithiasis and foci of low grade biliary intraepithelial neoplasia (not at margins) without evidence of high grade dysplasia or " malignancy.  Given this finding, the patient has been referred for recommendations on management.  We discussed the significance of low grade biliary intraepithelial neoplasia and the very low risk of this becoming malignancy.  Even with the residual back wall of gallbladder left in situ, in the absence of high grade dysplasia or malignancy, I think his overall risk is likely low and that the risks of going back for further surgery to remove any residual gallbladder would outweigh the benefits.  I think before officially moving down this path of management, it would be worthwhile to obtain a CTAP with liver protocol contrast to be sure there is not concern for any residual suspicious mass lesion as this area was described as firmly adherent in the operative report.  Provided there was not a concerning finding, non-operative management would be the likely recommendation.  Will obtain imaging and reconvene to discuss.  Questions answered and the patient was in agreement with and understanding of the plan.    A total of 20 minutes were spent on this encounter including face to face consultation, imaging review, chart review, documentation, and coordination of care.

## 2024-08-13 ENCOUNTER — PRE VISIT (OUTPATIENT)
Dept: SURGERY | Facility: CLINIC | Age: 64
End: 2024-08-13
Payer: COMMERCIAL

## 2024-08-13 ENCOUNTER — ONCOLOGY VISIT (OUTPATIENT)
Dept: SURGERY | Facility: CLINIC | Age: 64
End: 2024-08-13
Attending: NURSE PRACTITIONER
Payer: COMMERCIAL

## 2024-08-13 VITALS
OXYGEN SATURATION: 94 % | TEMPERATURE: 98.6 F | WEIGHT: 243.4 LBS | DIASTOLIC BLOOD PRESSURE: 90 MMHG | RESPIRATION RATE: 16 BRPM | HEIGHT: 71 IN | BODY MASS INDEX: 34.07 KG/M2 | HEART RATE: 82 BPM | SYSTOLIC BLOOD PRESSURE: 147 MMHG

## 2024-08-13 DIAGNOSIS — D49.9 PRECANCEROUS LESION: ICD-10-CM

## 2024-08-13 DIAGNOSIS — K81.1 CHRONIC CHOLECYSTITIS: Primary | ICD-10-CM

## 2024-08-13 PROCEDURE — G0463 HOSPITAL OUTPT CLINIC VISIT: HCPCS | Performed by: SURGERY

## 2024-08-13 PROCEDURE — 99024 POSTOP FOLLOW-UP VISIT: CPT | Performed by: SURGERY

## 2024-08-13 ASSESSMENT — PAIN SCALES - GENERAL: PAINLEVEL: NO PAIN (0)

## 2024-08-13 NOTE — NURSING NOTE
"Oncology Rooming Note    August 13, 2024 12:04 PM   Shon Sargent is a 63 year old male who presents for:    Chief Complaint   Patient presents with    Oncology Clinic Visit     Precancerous lesion     Initial Vitals: BP (!) 147/90 (BP Location: Right arm, Patient Position: Sitting, Cuff Size: Adult Large)   Pulse 82   Temp 98.6  F (37  C) (Oral)   Resp 16   Ht 1.791 m (5' 10.51\")   Wt 110.4 kg (243 lb 6.4 oz)   SpO2 94%   BMI 34.42 kg/m   Estimated body mass index is 34.42 kg/m  as calculated from the following:    Height as of this encounter: 1.791 m (5' 10.51\").    Weight as of this encounter: 110.4 kg (243 lb 6.4 oz). Body surface area is 2.34 meters squared.  No Pain (0) Comment: Data Unavailable   No LMP for male patient.  Allergies reviewed: Yes  Medications reviewed: Yes    Medications: Medication refills not needed today.  Pharmacy name entered into [place]: [id number] - [place] [id number] [address]    Frailty Screening:   Is the patient here for a new oncology consult visit in cancer care? 1. Yes. Over the past month, have you experienced difficulty or required a caregiver to assist with:   1. Balance, walking or general mobility (including any falls)? NO  2. Completion of self-care tasks such as bathing, dressing, toileting, grooming/hygiene?  NO  3. Concentration or memory that affects your daily life?  NO       Clinical concerns: none  Lavinia Hill"

## 2024-08-13 NOTE — LETTER
"8/13/2024      Shon Sargent  [address]  [facility] [address]      Dear Colleague,    Thank you for referring your patient, Shon Sargent, to the [facility]. Please see a copy of my visit note below.    Surgical Oncology - New Patient  8/13/2024    63 M s/p laparoscopic partial cholecystectomy (small portion of hepatic bed gallbladder wall left in place an fulgurated per the operative report) for chronic cholecystitis and choledocholithiasis 6/14/2024.  He had subsequent ERCP with stone clearance of the CBD 6/18/2024.  Final pathology on the gallbladder showed chronic cholecystitis with cholelithiasis and foci of low grade biliary intraepithelial neoplasia (not at margins) without evidence of high grade dysplasia or malignancy.  Given this finding, the patient has been referred for recommendations on management.    Of Note: The patient is feeling well since his surgery.  He is worried about the findings in his gallbladder.  He does not have significant cardiac or pulmonary problems.  No blood thinning medications.  Active and independent without the use of assistive devices.    BP (!) 147/90 (BP Location: Right arm, Patient Position: Sitting, Cuff Size: Adult Large)   Pulse 82   Temp 98.6  F (37  C) (Oral)   Resp 16   Ht 1.791 m (5' 10.51\")   Wt 110.4 kg (243 lb 6.4 oz)   SpO2 94%   BMI 34.42 kg/m      Surgical Pathology (6/14/2024):  Gallbladder, cholecystectomy:  - Chronic cholecystitis with cholelithiasis.  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    No recent labs and no significant abnormalities on his last set in June 2024.    Assessment/Plan:  63 M s/p laparoscopic partial cholecystectomy (small portion of hepatic bed gallbladder wall left in place and fulgurated per the operative report) for chronic cholecystitis and choledocholithiasis 6/14/2024.  He had subsequent ERCP with stone clearance " of the CBD 6/18/2024.  Final pathology on the gallbladder showed chronic cholecystitis with cholelithiasis and foci of low grade biliary intraepithelial neoplasia (not at margins) without evidence of high grade dysplasia or malignancy.  Given this finding, the patient has been referred for recommendations on management.  We discussed the significance of low grade biliary intraepithelial neoplasia and the very low risk of this becoming malignancy.  Even with the residual back wall of gallbladder left in situ, in the absence of high grade dysplasia or malignancy, I think his overall risk is likely low and that the risks of going back for further surgery to remove any residual gallbladder would outweigh the benefits.  I think before officially moving down this path of management, it would be worthwhile to obtain a CTAP with liver protocol contrast to be sure there is not concern for any residual suspicious mass lesion as this area was described as firmly adherent in the operative report.  Provided there was not a concerning finding, non-operative management would be the likely recommendation.  Will obtain imaging and reconvene to discuss.  Questions answered and the patient was in agreement with and understanding of the plan.    A total of 20 minutes were spent on this encounter including face to face consultation, imaging review, chart review, documentation, and coordination of care.      Again, thank you for allowing me to participate in the care of your patient.        Sincerely,        Anastacio Nguyen MD

## 2024-08-13 NOTE — PATIENT INSTRUCTIONS
CT scan  and follow-up visit with Dr. Nguyen - Clinic Coordinator will call to schedule      COURTNEY Alanis @ [phone]

## 2024-08-23 ENCOUNTER — ANCILLARY PROCEDURE (OUTPATIENT)
Dept: CT IMAGING | Facility: CLINIC | Age: 64
End: 2024-08-23
Attending: SURGERY
Payer: COMMERCIAL

## 2024-08-23 DIAGNOSIS — D49.9 PRECANCEROUS LESION: ICD-10-CM

## 2024-08-23 DIAGNOSIS — K81.1 CHRONIC CHOLECYSTITIS: ICD-10-CM

## 2024-08-23 PROCEDURE — 74178 CT ABD&PLV WO CNTR FLWD CNTR: CPT | Mod: TC | Performed by: RADIOLOGY

## 2024-08-23 RX ORDER — IOPAMIDOL 755 MG/ML
135 INJECTION, SOLUTION INTRAVASCULAR ONCE
Status: COMPLETED | OUTPATIENT
Start: 2024-08-23 | End: 2024-08-23

## 2024-08-23 RX ADMIN — IOPAMIDOL 135 ML: 755 INJECTION, SOLUTION INTRAVASCULAR at 08:43

## 2024-08-27 ENCOUNTER — VIRTUAL VISIT (OUTPATIENT)
Dept: SURGERY | Facility: CLINIC | Age: 64
End: 2024-08-27
Attending: SURGERY
Payer: COMMERCIAL

## 2024-08-27 VITALS — BODY MASS INDEX: 34.02 KG/M2 | WEIGHT: 243 LBS | HEIGHT: 71 IN

## 2024-08-27 DIAGNOSIS — D49.9 PRECANCEROUS LESION: ICD-10-CM

## 2024-08-27 DIAGNOSIS — K81.1 CHRONIC CHOLECYSTITIS: ICD-10-CM

## 2024-08-27 PROCEDURE — 99024 POSTOP FOLLOW-UP VISIT: CPT | Mod: 93 | Performed by: SURGERY

## 2024-08-27 ASSESSMENT — PAIN SCALES - GENERAL: PAINLEVEL: NO PAIN (0)

## 2024-08-27 NOTE — PROGRESS NOTES
Virtual Visit Details    Type of service: Telephone Visit   Phone call duration: 4 minutes   Originating Location (pt. Location): Home    Distant Location (provider location):  On-site    Surgical Oncology - Established Patient  8/27/2024    63 M s/p laparoscopic partial cholecystectomy (small portion of hepatic bed gallbladder wall left in place and fulgurated per the operative report) for chronic cholecystitis and choledocholithiasis 6/14/2024.  He had subsequent ERCP with stone clearance of the CBD 6/18/2024.  Final pathology on the gallbladder showed chronic cholecystitis with cholelithiasis and foci of low grade biliary intraepithelial neoplasia (not at margins) without evidence of high grade dysplasia or malignancy.  Given this finding, the patient has been referred for recommendations on management.  He has since had a liver CT without evidence of residual disease or worrisome findings as below:    CT Liver (8/23/2024): IMPRESSION: No recurrent or residual malignancy demonstrated.     We briefly re-discussed the significance of low grade biliary intraepithelial neoplasia and the very low risk of this becoming malignancy.  Even with the residual back wall of gallbladder left in situ, in the absence of high grade dysplasia or malignancy, I think his overall risk is likely low and that the risks of going back for further surgery to remove any residual gallbladder would outweigh the benefits.  Thus, I would not recommend further surgery.  Whether or not he would need ongoing surveillance is not well known and this could be done with his prior providers if he wanted to pursue this.  He does have a non-worrisome 5 mm pulmonary nodule, and again, this could be followed by prior providers and I discussed the patient reaching out to his PCP for this purpose.  No further follow-up needed with surgical oncology unless issues or questions arise.  Questions answered and the patient was in agreement with and understanding  of the plan.     A total of 10 minutes were spent on this encounter including face to face consultation, imaging review, chart review, documentation, and coordination of care.

## 2024-08-27 NOTE — LETTER
8/27/2024      Shon Sargent  [id number]  [facility] [address]      Dear Colleague,    Thank you for referring your patient, Shon Sargent, to the [facility]. Please see a copy of my visit note below.    Virtual Visit Details    Type of service: Telephone Visit   Phone call duration: 4 minutes   Originating Location (pt. Location): Home    Distant Location (provider location):  On-site    Surgical Oncology - Established Patient  8/27/2024    63 M s/p laparoscopic partial cholecystectomy (small portion of hepatic bed gallbladder wall left in place and fulgurated per the operative report) for chronic cholecystitis and choledocholithiasis 6/14/2024.  He had subsequent ERCP with stone clearance of the CBD 6/18/2024.  Final pathology on the gallbladder showed chronic cholecystitis with cholelithiasis and foci of low grade biliary intraepithelial neoplasia (not at margins) without evidence of high grade dysplasia or malignancy.  Given this finding, the patient has been referred for recommendations on management.  He has since had a liver CT without evidence of residual disease or worrisome findings as below:    CT Liver (8/23/2024): IMPRESSION: No recurrent or residual malignancy demonstrated.     We briefly re-discussed the significance of low grade biliary intraepithelial neoplasia and the very low risk of this becoming malignancy.  Even with the residual back wall of gallbladder left in situ, in the absence of high grade dysplasia or malignancy, I think his overall risk is likely low and that the risks of going back for further surgery to remove any residual gallbladder would outweigh the benefits.  Thus, I would not recommend further surgery.  Whether or not he would need ongoing surveillance is not well known and this could be done with his prior providers if he wanted to pursue this.  He does have a non-worrisome 5 mm pulmonary nodule, and again, this could be followed by prior  providers and I discussed the patient reaching out to his PCP for this purpose.  No further follow-up needed with surgical oncology unless issues or questions arise.  Questions answered and the patient was in agreement with and understanding of the plan.     A total of 10 minutes were spent on this encounter including face to face consultation, imaging review, chart review, documentation, and coordination of care.      Again, thank you for allowing me to participate in the care of your patient.        Sincerely,        Anastacio Nguyen MD

## 2024-08-27 NOTE — NURSING NOTE
Is the patient currently in the state of [place]? YES    Visit mode:TELEPHONE    If the visit is dropped, the patient can be reconnected by: VIDEO VISIT: Send to e-mail at: [email or web address]    Will anyone else be joining the visit? NO  (If patient encounters technical issues they should call [phone]    How would you like to obtain your AVS? MyChart    Are changes needed to the allergy or medication list? No  Rooming Documentation:  Questionnaire(s) completed      Reason for visit: Video Visit (Follow up)    Jeanette PEREA

## 2024-11-02 ENCOUNTER — HEALTH MAINTENANCE LETTER (OUTPATIENT)
Age: 64
End: 2024-11-02

## 2025-03-06 ENCOUNTER — MYC MEDICAL ADVICE (OUTPATIENT)
Dept: ONCOLOGY | Facility: CLINIC | Age: 65
End: 2025-03-06
Payer: COMMERCIAL

## 2025-03-06 DIAGNOSIS — C61 PROSTATE CANCER (H): Primary | ICD-10-CM

## 2025-03-10 ENCOUNTER — TELEPHONE (OUTPATIENT)
Dept: UROLOGY | Facility: CLINIC | Age: 65
End: 2025-03-10
Payer: COMMERCIAL

## 2025-03-10 NOTE — TELEPHONE ENCOUNTER
Patient confirmed scheduled appointment:  Date: 04/23/25  Time: 2:00pm  Visit type: Return Patient  Provider:   Location: [facility]  Testing/imaging: PSA  Additional notes:

## 2025-03-25 ENCOUNTER — OFFICE VISIT (OUTPATIENT)
Dept: FAMILY MEDICINE | Facility: CLINIC | Age: 65
End: 2025-03-25
Payer: COMMERCIAL

## 2025-03-25 VITALS
OXYGEN SATURATION: 99 % | HEART RATE: 65 BPM | DIASTOLIC BLOOD PRESSURE: 89 MMHG | HEIGHT: 71 IN | SYSTOLIC BLOOD PRESSURE: 132 MMHG | RESPIRATION RATE: 20 BRPM | WEIGHT: 248 LBS | TEMPERATURE: 97.9 F | BODY MASS INDEX: 34.72 KG/M2

## 2025-03-25 DIAGNOSIS — Z00.00 ROUTINE GENERAL MEDICAL EXAMINATION AT A HEALTH CARE FACILITY: Primary | ICD-10-CM

## 2025-03-25 DIAGNOSIS — K21.9 GASTROESOPHAGEAL REFLUX DISEASE WITHOUT ESOPHAGITIS: ICD-10-CM

## 2025-03-25 DIAGNOSIS — C61 PROSTATE CANCER (H): ICD-10-CM

## 2025-03-25 DIAGNOSIS — R73.03 PRE-DIABETES: ICD-10-CM

## 2025-03-25 DIAGNOSIS — M10.00 IDIOPATHIC GOUT, UNSPECIFIED CHRONICITY, UNSPECIFIED SITE: ICD-10-CM

## 2025-03-25 DIAGNOSIS — M1A.9XX0 CHRONIC GOUT INVOLVING TOE WITHOUT TOPHUS, UNSPECIFIED CAUSE, UNSPECIFIED LATERALITY: ICD-10-CM

## 2025-03-25 DIAGNOSIS — I10 ESSENTIAL HYPERTENSION WITH GOAL BLOOD PRESSURE LESS THAN 140/90: ICD-10-CM

## 2025-03-25 DIAGNOSIS — R05.1 ACUTE COUGH: ICD-10-CM

## 2025-03-25 DIAGNOSIS — E66.09 CLASS 1 OBESITY DUE TO EXCESS CALORIES WITHOUT SERIOUS COMORBIDITY WITH BODY MASS INDEX (BMI) OF 34.0 TO 34.9 IN ADULT: ICD-10-CM

## 2025-03-25 DIAGNOSIS — E66.811 CLASS 1 OBESITY DUE TO EXCESS CALORIES WITHOUT SERIOUS COMORBIDITY WITH BODY MASS INDEX (BMI) OF 34.0 TO 34.9 IN ADULT: ICD-10-CM

## 2025-03-25 LAB
ALBUMIN SERPL BCG-MCNC: 4.4 G/DL (ref 3.5–5.2)
ALP SERPL-CCNC: 114 U/L (ref 40–150)
ALT SERPL W P-5'-P-CCNC: 29 U/L (ref 0–70)
ANION GAP SERPL CALCULATED.3IONS-SCNC: 11 MMOL/L (ref 7–15)
AST SERPL W P-5'-P-CCNC: 26 U/L (ref 0–45)
BILIRUB SERPL-MCNC: 0.6 MG/DL
BUN SERPL-MCNC: 16.4 MG/DL (ref 8–23)
CALCIUM SERPL-MCNC: 9.7 MG/DL (ref 8.8–10.4)
CHLORIDE SERPL-SCNC: 105 MMOL/L (ref 98–107)
CHOLEST SERPL-MCNC: 191 MG/DL
CREAT SERPL-MCNC: 1.22 MG/DL (ref 0.67–1.17)
EGFRCR SERPLBLD CKD-EPI 2021: 67 ML/MIN/1.73M2
ERYTHROCYTE [DISTWIDTH] IN BLOOD BY AUTOMATED COUNT: 13.4 % (ref 10–15)
EST. AVERAGE GLUCOSE BLD GHB EST-MCNC: 120 MG/DL
FASTING STATUS PATIENT QL REPORTED: YES
FASTING STATUS PATIENT QL REPORTED: YES
GLUCOSE SERPL-MCNC: 111 MG/DL (ref 70–99)
HBA1C MFR BLD: 5.8 % (ref 0–5.6)
HCO3 SERPL-SCNC: 25 MMOL/L (ref 22–29)
HCT VFR BLD AUTO: 48.6 % (ref 40–53)
HDLC SERPL-MCNC: 33 MG/DL
HGB BLD-MCNC: 15.8 G/DL (ref 13.3–17.7)
LDLC SERPL CALC-MCNC: 113 MG/DL
MCH RBC QN AUTO: 28.6 PG (ref 26.5–33)
MCHC RBC AUTO-ENTMCNC: 32.5 G/DL (ref 31.5–36.5)
MCV RBC AUTO: 88 FL (ref 78–100)
NONHDLC SERPL-MCNC: 158 MG/DL
PLATELET # BLD AUTO: 208 10E3/UL (ref 150–450)
POTASSIUM SERPL-SCNC: 5.4 MMOL/L (ref 3.4–5.3)
PROT SERPL-MCNC: 7.3 G/DL (ref 6.4–8.3)
PSA SERPL DL<=0.01 NG/ML-MCNC: 0.04 NG/ML (ref 0–4.5)
PSA SERPL DL<=0.01 NG/ML-MCNC: 0.04 NG/ML (ref 0–4.5)
RBC # BLD AUTO: 5.52 10E6/UL (ref 4.4–5.9)
SODIUM SERPL-SCNC: 141 MMOL/L (ref 135–145)
TRIGL SERPL-MCNC: 227 MG/DL
URATE SERPL-MCNC: 5.6 MG/DL (ref 3.4–7)
WBC # BLD AUTO: 7.5 10E3/UL (ref 4–11)

## 2025-03-25 PROCEDURE — 80061 LIPID PANEL: CPT | Performed by: NURSE PRACTITIONER

## 2025-03-25 PROCEDURE — 85027 COMPLETE CBC AUTOMATED: CPT | Performed by: NURSE PRACTITIONER

## 2025-03-25 PROCEDURE — 84550 ASSAY OF BLOOD/URIC ACID: CPT | Performed by: NURSE PRACTITIONER

## 2025-03-25 PROCEDURE — 3079F DIAST BP 80-89 MM HG: CPT | Performed by: NURSE PRACTITIONER

## 2025-03-25 PROCEDURE — 36415 COLL VENOUS BLD VENIPUNCTURE: CPT | Performed by: NURSE PRACTITIONER

## 2025-03-25 PROCEDURE — G0103 PSA SCREENING: HCPCS | Performed by: NURSE PRACTITIONER

## 2025-03-25 PROCEDURE — 99214 OFFICE O/P EST MOD 30 MIN: CPT | Mod: 25 | Performed by: NURSE PRACTITIONER

## 2025-03-25 PROCEDURE — 80053 COMPREHEN METABOLIC PANEL: CPT | Performed by: NURSE PRACTITIONER

## 2025-03-25 PROCEDURE — 3075F SYST BP GE 130 - 139MM HG: CPT | Performed by: NURSE PRACTITIONER

## 2025-03-25 PROCEDURE — 84153 ASSAY OF PSA TOTAL: CPT | Performed by: NURSE PRACTITIONER

## 2025-03-25 PROCEDURE — 1126F AMNT PAIN NOTED NONE PRSNT: CPT | Performed by: NURSE PRACTITIONER

## 2025-03-25 PROCEDURE — 83036 HEMOGLOBIN GLYCOSYLATED A1C: CPT | Performed by: NURSE PRACTITIONER

## 2025-03-25 PROCEDURE — 99396 PREV VISIT EST AGE 40-64: CPT | Performed by: NURSE PRACTITIONER

## 2025-03-25 RX ORDER — AMLODIPINE BESYLATE 5 MG/1
5 TABLET ORAL DAILY
Qty: 90 TABLET | Refills: 3 | Status: CANCELLED | OUTPATIENT
Start: 2025-03-25

## 2025-03-25 RX ORDER — LISINOPRIL 30 MG/1
30 TABLET ORAL EVERY MORNING
Qty: 90 TABLET | Refills: 3 | Status: SHIPPED | OUTPATIENT
Start: 2025-03-25

## 2025-03-25 RX ORDER — ALLOPURINOL 300 MG/1
1 TABLET ORAL EVERY MORNING
Qty: 90 TABLET | Refills: 3 | Status: SHIPPED | OUTPATIENT
Start: 2025-03-25

## 2025-03-25 RX ORDER — ALBUTEROL SULFATE 90 UG/1
AEROSOL, METERED RESPIRATORY (INHALATION)
Qty: 18 G | Refills: 0 | Status: SHIPPED | OUTPATIENT
Start: 2025-03-25

## 2025-03-25 RX ORDER — AMLODIPINE BESYLATE 10 MG/1
10 TABLET ORAL DAILY
Qty: 90 TABLET | Refills: 3 | Status: SHIPPED | OUTPATIENT
Start: 2025-03-25

## 2025-03-25 RX ORDER — OMEPRAZOLE 20 MG/1
20 CAPSULE, DELAYED RELEASE ORAL EVERY MORNING
Qty: 90 CAPSULE | Refills: 3 | Status: SHIPPED | OUTPATIENT
Start: 2025-03-25

## 2025-03-25 SDOH — HEALTH STABILITY: PHYSICAL HEALTH: ON AVERAGE, HOW MANY DAYS PER WEEK DO YOU ENGAGE IN MODERATE TO STRENUOUS EXERCISE (LIKE A BRISK WALK)?: 0 DAYS

## 2025-03-25 SDOH — HEALTH STABILITY: PHYSICAL HEALTH: ON AVERAGE, HOW MANY MINUTES DO YOU ENGAGE IN EXERCISE AT THIS LEVEL?: 0 MIN

## 2025-03-25 ASSESSMENT — SOCIAL DETERMINANTS OF HEALTH (SDOH): HOW OFTEN DO YOU GET TOGETHER WITH FRIENDS OR RELATIVES?: MORE THAN THREE TIMES A WEEK

## 2025-03-25 ASSESSMENT — PAIN SCALES - GENERAL: PAINLEVEL_OUTOF10: NO PAIN (0)

## 2025-03-25 NOTE — PROGRESS NOTES
Preventive Care Visit  [facility]  TEO Roe CNP, Family Medicine  Mar 25, 2025      Assessment & Plan     Routine general medical examination at a health care facility    - REVIEW OF HEALTH MAINTENANCE PROTOCOL ORDERS  - Lipid panel reflex to direct LDL Fasting; Future  - CBC with platelets; Future  - Comprehensive metabolic panel (BMP + Alb, Alk Phos, ALT, AST, Total. Bili, TP); Future  - PSA, screen; Future  - Lipid panel reflex to direct LDL Fasting  - CBC with platelets  - Comprehensive metabolic panel (BMP + Alb, Alk Phos, ALT, AST, Total. Bili, TP)  - PSA, screen    Gastroesophageal reflux disease without esophagitis  stable  - omeprazole (PRILOSEC) 20 MG DR capsule; Take 1 capsule (20 mg) by mouth every morning.    Acute cough  stable  - VENTOLIN  (90 Base) MCG/ACT inhaler; INHALE 2 PUFFS BY MOUTH EVERY 6 HOURS AS NEEDED FOR SHORTNESS OF BREATH /DYSPNEA  OR  WHEEZING    Essential hypertension with goal blood pressure less than 140/90  Borderline will increase Norvasc to 10 mg  - lisinopril (ZESTRIL) 30 MG tablet; Take 1 tablet (30 mg) by mouth every morning.  - amLODIPine (NORVASC) 10 MG tablet; Take 1 tablet (10 mg) by mouth daily.    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    Idiopathic gout, unspecified chronicity, unspecified site  Stable  - Uric acid; Future  - Uric acid      Class 1 obesity due to excess calories without serious comorbidity with body mass index (BMI) of 34.0 to 34.9 in adult  Reviewed diet and exercise patient would like to see nutritionist recommend also intermittent fasting may be an option for patient.  - Adult Nutrition  Referral; Future    Pre-diabetes  Labs pending  - Hemoglobin A1c; Future  - Hemoglobin A1c    Prostate cancer (H)  Labs pending does have follow-up with urology.  - PSA,  "tumor marker    Patient has been advised of split billing requirements and indicates understanding: Yes        BMI  Estimated body mass index is 34.59 kg/m  as calculated from the following:    Height as of this encounter: 1.803 m (5' 11\").    Weight as of this encounter: 112.5 kg (248 lb).   Weight management plan: Discussed healthy diet and exercise guidelines    Counseling  Appropriate preventive services were addressed with this patient via screening, questionnaire, or discussion as appropriate for fall prevention, nutrition, physical activity, Tobacco-use cessation, social engagement, weight loss and cognition.  Checklist reviewing preventive services available has been given to the patient.  Reviewed patient's diet, addressing concerns and/or questions.   The patient was instructed to see the dentist every 6 months.       See Patient Instructions    Shawn Menendez is a 64 year old, presenting for the following:  Physical and Hypertension        7/11/2024     1:24 PM   Additional Questions   Roomed by Michaela OLSON   Accompanied by Self          Healthy Habits:     Taking medications regularly:  0  History of Present Illness       Hypertension: He presents for follow up of hypertension.  He does not check blood pressure  regularly outside of the clinic. Outside blood pressures have been over 140/90. He does not follow a low salt diet.     He eats 0-1 servings of fruits and vegetables daily.He consumes 1 sweetened beverage(s) daily.He exercises with enough effort to increase his heart rate 10 to 19 minutes per day.  He exercises with enough effort to increase his heart rate 4 days per week.   He is taking medications regularly.      Advance Care Planning  Patient does not have a Health Care Directive: Discussed advance care planning with patient; however, patient declined at this time.      3/25/2025   General Health   How would you rate your overall physical health? Good   Feel stress (tense, anxious, or " unable to sleep) Only a little   (!) STRESS CONCERN      3/25/2025   Nutrition   Three or more servings of calcium each day? Yes   Diet: Regular (no restrictions)   How many servings of fruit and vegetables per day? (!) 0-1   How many sweetened beverages each day? 0-1         3/25/2025   Exercise   Days per week of moderate/strenous exercise 0 days   Average minutes spent exercising at this level 0 min   (!) EXERCISE CONCERN      3/25/2025   Social Factors   Frequency of gathering with friends or relatives More than three times a week   Worry food won't last until get money to buy more No   Food not last or not have enough money for food? No   Do you have housing? (Housing is defined as stable permanent housing and does not include staying ouside in a car, in a tent, in an abandoned building, in an overnight shelter, or couch-surfing.) Yes   Are you worried about losing your housing? No   Lack of transportation? No   Unable to get utilities (heat,electricity)? No         3/25/2025   Fall Risk   Fallen 2 or more times in the past year? No    Trouble with walking or balance? No        Proxy-reported          3/25/2025   Dental   Dentist two times every year? (!) NO           Today's PHQ-2 Score:       3/25/2025     8:10 AM   PHQ-2 ( 1999 [employer])   Q1: Little interest or pleasure in doing things 0    Q2: Feeling down, depressed or hopeless 0    PHQ-2 Score 0    Q1: Little interest or pleasure in doing things Not at all   Q2: Feeling down, depressed or hopeless Not at all   PHQ-2 Score 0       Proxy-reported           3/25/2025   Substance Use   Alcohol more than 3/day or more than 7/wk No   Do you use any other substances recreationally? No     Social History     Tobacco Use    Smoking status: Never    Smokeless tobacco: Never   Vaping Use    Vaping status: Never Used   Substance Use Topics    Alcohol use: Not Currently     Comment: occas    Drug use: No             3/25/2025   One time HIV Screening   Previous HIV  test? No         3/25/2025   STI Screening   New sexual partner(s) since last STI/HIV test? No   Last PSA:   PSA   Date Value Ref Range Status   2019 4.76 (H) 0 - 4 ug/L Final     Comment:     Assay Method:  Chemiluminescence using Siemens Vista analyzer     Prostate Specific Antigen Screen   Date Value Ref Range Status   2022 4.58 (H) 0.00 - 4.00 ug/L Final     PSA Tumor Marker   Date Value Ref Range Status   2024 <0.01 0.00 - 4.50 ng/mL Final     ASCVD Risk   The 10-year ASCVD risk score (Brandan NEWELL, et al., 2019) is: 14%    Values used to calculate the score:      Age: 64 years      Sex: Male      Is Non- : No      Diabetic: No      Tobacco smoker: No      Systolic Blood Pressure: 132 mmHg      Is BP treated: Yes      HDL Cholesterol: 36 mg/dL      Total Cholesterol: 163 mg/dL    Fracture Risk Assessment Tool  Link to Frax Calculator  Use the information below to complete the Frax calculator  : 1960  Sex: male  Weight (kg): 112.5 kg (actual weight)  Height (cm): 180.3 cm  Previous Fragility Fracture:  No  History of parent with fractured hip:  No  Current Smoking:  No  Patient has been on glucocorticoids for more than 3 months (5mg/day or more): No  Rheumatoid Arthritis on Problem List:  No  Secondary Osteoporosis on Problem List:  No  Consumes 3 or more units of alcohol per day: No  Femoral Neck BMD (g/cm2)           Reviewed and updated as needed this visit by Provider                    BP Readings from Last 3 Encounters:   25 132/89   24 (!) 147/90   24 136/84    Wt Readings from Last 3 Encounters:   25 112.5 kg (248 lb)   24 110.2 kg (243 lb)   24 110.4 kg (243 lb 6.4 oz)                  Patient Active Problem List   Diagnosis    Essential hypertension with goal blood pressure less than 140/90    Tremor    Gout    ED (erectile dysfunction)    Psoriasis    Esophageal reflux (GERD)    CARDIOVASCULAR SCREENING; LDL  GOAL LESS THAN 130    Iron deficiency anemia    Class 2 severe obesity due to excess calories with serious comorbidity in adult (H)    Post-operative state     Past Surgical History:   Procedure Laterality Date    CHOLECYSTECTOMY WITH CHOLANGIOGRAMS N/A 6/14/2024    Procedure: CHOLECYSTECTOMY, LAPAROSCOPIC with cholangiograms;  Surgeon: Loc Kraemr DO;  Location: [facility]    COLONOSCOPY N/A 10/08/2015    Procedure: COLONOSCOPY;  Surgeon: Rolando Ayers MD;  Location: [facility]    DAVINCI PROSTATECTOMY, LYMPHADENECTOMY N/A 2/29/2024    Procedure: ROBOT-ASSISTED LAPAROSCOPIC RADICAL PROSTATECTOMY WITH BILATERAL PELVIC LYMPHADENECTOMY;  Surgeon: Elizabeth Diop MD;  Location:  OR    ENDOSCOPIC RETROGRADE CHOLANGIOPANCREATOGRAM N/A 6/18/2024    Procedure: ENDOSCOPIC RETROGRADE CHOLANGIOPANCREATOGRAPHY WITH BILIARY SPHINCTEROTOMY AND STONE REMOVAL, PANCREATIC DUCT STENT PLACEMENT;  Surgeon: Fernando Peterson MD;  Location:  OR    ESOPHAGOSCOPY, GASTROSCOPY, DUODENOSCOPY (EGD), COMBINED N/A 11/03/2014    Procedure: COMBINED ESOPHAGOSCOPY, GASTROSCOPY, DUODENOSCOPY (EGD);  Surgeon: Rolando Ayers MD;  Location: [facility]    ESOPHAGOSCOPY, GASTROSCOPY, DUODENOSCOPY (EGD), COMBINED N/A 10/08/2015    Procedure: COMBINED ESOPHAGOSCOPY, GASTROSCOPY, DUODENOSCOPY (EGD), BIOPSY SINGLE OR MULTIPLE;  Surgeon: Rolando Ayers MD;  Location: [facility]    HERNIA REPAIR  01/01/2007    umbilical    TOTAL HIP ARTHROPLASTY Right 2021       Social History     Tobacco Use    Smoking status: Never    Smokeless tobacco: Never   Substance Use Topics    Alcohol use: Not Currently     Comment: occas     Family History   Problem Relation Age of Onset    Cancer Mother         lymphoma    Hypertension Father     Circulatory Father     Gastrointestinal Disease Father     Lipids Father     Alzheimer Disease Maternal Grandfather     Anesthesia Reaction Maternal Grandfather         memory issues    Prostate Cancer Maternal Grandfather      Cardiovascular Paternal Grandfather     Heart Disease Paternal Grandfather     Asthma Son         allergy induced    Asthma Son         al;lergy induced    Coronary Artery Disease No family hx of     Colon Cancer No family hx of     Venous thrombosis No family hx of          Current Outpatient Medications   Medication Sig Dispense Refill    acetaminophen (TYLENOL) 500 MG tablet Take 1,000 mg by mouth as needed for mild pain      allopurinol (ZYLOPRIM) 300 MG tablet Take 1 tablet (300 mg) by mouth every morning 90 tablet 3    amLODIPine (NORVASC) 5 MG tablet Take 1 tablet (5 mg) by mouth daily 90 tablet 3    lisinopril (ZESTRIL) 30 MG tablet Take 1 tablet (30 mg) by mouth every morning 90 tablet 3    omeprazole (PRILOSEC) 20 MG DR capsule Take 1 capsule (20 mg) by mouth every morning 90 capsule 3    VENTOLIN  (90 Base) MCG/ACT inhaler INHALE 2 PUFFS BY MOUTH EVERY 6 HOURS AS NEEDED FOR SHORTNESS OF BREATH /DYSPNEA  OR  WHEEZING 18 g 0     Allergies   Allergen Reactions    Oxycodone Nausea and Vomiting    Valdecoxib Hives     Bextra     Recent Labs   Lab Test 06/18/24  0932 06/10/24  0917 04/21/24  1006 02/29/24  0930 02/13/24  1035 06/29/23  0736 06/20/22  1345 06/20/22  1345 12/27/19  0952 09/02/19  0619 06/11/19  1619   A1C  --  6.1*  --   --   --  5.8*  --   --   --   --   --    LDL  --   --   --   --   --  104*  --  83 138*  --   --    HDL  --   --   --   --   --  36*  --  35* 42  --   --    TRIG  --   --   --   --   --  117  --  237* 166*  --   --    ALT 28  --  23  --  20 23   < > 23  --  25 22   CR 0.93 0.90 0.89   < > 0.99 0.97   < > 0.88  --  0.85 0.85   GFRESTIMATED >90 >90 >90   < > 86 89   < > >90  --  >90 >90   GFRESTBLACK  --   --   --   --   --   --   --   --   --  >90 >90   POTASSIUM 4.2 4.4 3.8   < > 4.5 4.2   < > 3.9  --  4.5 3.9    < > = values in this interval not displayed.          Review of Systems  Constitutional, HEENT, cardiovascular, pulmonary, gi and gu systems are negative, except  "as otherwise noted.     Objective    Exam  /89 (BP Location: Right arm)   Pulse 65   Temp 97.9  F (36.6  C) (Tympanic)   Resp 20   Ht 1.803 m (5' 11\")   Wt 112.5 kg (248 lb)   SpO2 99%   BMI 34.59 kg/m     Estimated body mass index is 34.59 kg/m  as calculated from the following:    Height as of this encounter: 1.803 m (5' 11\").    Weight as of this encounter: 112.5 kg (248 lb).    Physical Exam  GENERAL: alert and no distress  EYES: Eyes grossly normal to inspection, PERRL and conjunctivae and sclerae normal  HENT: ear canals and TM's normal, nose and mouth without ulcers or lesions  NECK: no adenopathy, no asymmetry, masses, or scars  RESP: lungs clear to auscultation - no rales, rhonchi or wheezes  CV: regular rate and rhythm, normal S1 S2, no S3 or S4, no murmur, click or rub, no peripheral edema  MS: no gross musculoskeletal defects noted, no edema  SKIN: no suspicious lesions or rashes  NEURO: Normal strength and tone, mentation intact and speech normal  PSYCH: mentation appears normal, affect normal/bright        Signed Electronically by: TEO Roe CNP    "

## 2025-03-25 NOTE — PATIENT INSTRUCTIONS
Patient Education   Preventive Care Advice   This is general advice given by our system to help you stay healthy. However, your care team may have specific advice just for you. Please talk to your care team about your preventive care needs.  Nutrition  Eat 5 or more servings of fruits and vegetables each day.  Try wheat bread, brown rice and whole grain pasta (instead of white bread, rice, and pasta).  Get enough calcium and vitamin D. Check the label on foods and aim for 100% of the RDA (recommended daily allowance).  Lifestyle  Exercise at least 150 minutes each week  (30 minutes a day, 5 days a week).  Do muscle strengthening activities 2 days a week. These help control your weight and prevent disease.  No smoking.  Wear sunscreen to prevent skin cancer.  Have a dental exam and cleaning every 6 months.  Yearly exams  See your health care team every year to talk about:  Any changes in your health.  Any medicines your care team has prescribed.  Preventive care, family planning, and ways to prevent chronic diseases.  Shots (vaccines)   HPV shots (up to age 26), if you've never had them before.  Hepatitis B shots (up to age 59), if you've never had them before.  COVID-19 shot: Get this shot when it's due.  Flu shot: Get a flu shot every year.  Tetanus shot: Get a tetanus shot every 10 years.  Pneumococcal, hepatitis A, and RSV shots: Ask your care team if you need these based on your risk.  Shingles shot (for age 50 and up)  General health tests  Diabetes screening:  Starting at age 35, Get screened for diabetes at least every 3 years.  If you are younger than age 35, ask your care team if you should be screened for diabetes.  Cholesterol test: At age 39, start having a cholesterol test every 5 years, or more often if advised.  Bone density scan (DEXA): At age 50, ask your care team if you should have this scan for osteoporosis (brittle bones).  Hepatitis C: Get tested at least once in your life.  STIs (sexually  transmitted infections)  Before age 24: Ask your care team if you should be screened for STIs.  After age 24: Get screened for STIs if you're at risk. You are at risk for STIs (including HIV) if:  You are sexually active with more than one person.  You don't use condoms every time.  You or a partner was diagnosed with a sexually transmitted infection.  If you are at risk for HIV, ask about PrEP medicine to prevent HIV.  Get tested for HIV at least once in your life, whether you are at risk for HIV or not.  Cancer screening tests  Cervical cancer screening: If you have a cervix, begin getting regular cervical cancer screening tests starting at age 21.  Breast cancer scan (mammogram): If you've ever had breasts, begin having regular mammograms starting at age 40. This is a scan to check for breast cancer.  Colon cancer screening: It is important to start screening for colon cancer at age 45.  Have a colonoscopy test every 10 years (or more often if you're at risk) Or, ask your provider about stool tests like a FIT test every year or Cologuard test every 3 years.  To learn more about your testing options, visit:   .  For help making a decision, visit:   https://bit.ly/sc74715.  Prostate cancer screening test: If you have a prostate, ask your care team if a prostate cancer screening test (PSA) at age 55 is right for you.  Lung cancer screening: If you are a current or former smoker ages 50 to 80, ask your care team if ongoing lung cancer screenings are right for you.  For informational purposes only. Not to replace the advice of your health care provider. Copyright   2023 [employer]. All rights reserved. Clinically reviewed by the [facility]. [employer] 168607 - REV 01/24.

## 2025-03-26 LAB — HOLD SPECIMEN: NORMAL

## 2025-04-07 ENCOUNTER — PRE VISIT (OUTPATIENT)
Dept: UROLOGY | Facility: CLINIC | Age: 65
End: 2025-04-07
Payer: COMMERCIAL

## 2025-04-07 NOTE — TELEPHONE ENCOUNTER
Reason for visit: annual follow-up    Dx/Hx/Sx: prostate cancer s/p radical prostatectomy in February 2024    Records/imaging/labs/orders: PSA done on 3/25    At rooming: standard    --  Dallin Yu on 4/7/2025 at 3:44 PM

## 2025-04-23 ENCOUNTER — OFFICE VISIT (OUTPATIENT)
Dept: UROLOGY | Facility: CLINIC | Age: 65
End: 2025-04-23
Payer: COMMERCIAL

## 2025-04-23 VITALS
OXYGEN SATURATION: 99 % | DIASTOLIC BLOOD PRESSURE: 83 MMHG | HEART RATE: 97 BPM | SYSTOLIC BLOOD PRESSURE: 133 MMHG | HEIGHT: 71 IN | BODY MASS INDEX: 33.6 KG/M2 | WEIGHT: 240 LBS

## 2025-04-23 DIAGNOSIS — C61 PROSTATE CANCER (H): Primary | ICD-10-CM

## 2025-04-23 ASSESSMENT — PAIN SCALES - GENERAL: PAINLEVEL_OUTOF10: NO PAIN (0)

## 2025-04-23 NOTE — NURSING NOTE
"Shon Sargent is a 64 year old male patient that presents today in clinic for the following:    Chief Complaint   Patient presents with    Follow Up     PSA       The patient's allergies and medications were reviewed as noted. A set of vitals were recorded as noted without incident. The patient does not have any other questions for the provider.    Blood pressure 133/83, pulse 97, height 1.803 m (5' 11\"), weight 108.9 kg (240 lb), SpO2 99%. Body mass index is 33.47 kg/m .    Patient Active Problem List   Diagnosis    Essential hypertension with goal blood pressure less than 140/90    Tremor    Gout    ED (erectile dysfunction)    Psoriasis    Esophageal reflux (GERD)    CARDIOVASCULAR SCREENING; LDL GOAL LESS THAN 130    Iron deficiency anemia    Class 2 severe obesity due to excess calories with serious comorbidity in adult (H)    Post-operative state       Allergies   Allergen Reactions    Oxycodone Nausea and Vomiting    Valdecoxib Hives     Bextra       Current Outpatient Medications   Medication Sig Dispense Refill    acetaminophen (TYLENOL) 500 MG tablet Take 1,000 mg by mouth as needed for mild pain      allopurinol (ZYLOPRIM) 300 MG tablet Take 1 tablet (300 mg) by mouth every morning. 90 tablet 3    amLODIPine (NORVASC) 10 MG tablet Take 1 tablet (10 mg) by mouth daily. 90 tablet 3    lisinopril (ZESTRIL) 30 MG tablet Take 1 tablet (30 mg) by mouth every morning. 90 tablet 3    omeprazole (PRILOSEC) 20 MG DR capsule Take 1 capsule (20 mg) by mouth every morning. 90 capsule 3    VENTOLIN  (90 Base) MCG/ACT inhaler INHALE 2 PUFFS BY MOUTH EVERY 6 HOURS AS NEEDED FOR SHORTNESS OF BREATH /DYSPNEA  OR  WHEEZING 18 g 0       Social History     Tobacco Use    Smoking status: Never    Smokeless tobacco: Never   Vaping Use    Vaping status: Never Used   Substance Use Topics    Alcohol use: Not Currently     Comment: occas    Drug use: No       Surya Vegas, EMT-P   4/23/2025  1:59 PM  "

## 2025-04-23 NOTE — LETTER
"4/23/2025       RE: Shon Sargent  [id number]  [facility] [address]     Dear Colleague,    Thank you for referring your patient, Shon Sargent, to the [facility] [place] at [facility]. Please see a copy of my visit note below.    Urology Clinic     HPI  Shon Sargent is a 64 year old male with history of prostate cancer sp RALP and PLND, here for follow-up.      The patient denies any significant changes to health, hospitalizations or new diagnoses in the interim.     PHYSICAL EXAM  /83 (BP Location: Right arm, Patient Position: Sitting, Cuff Size: Adult Large)   Pulse 97   Ht 1.803 m (5' 11\")   Wt 108.9 kg (240 lb)   SpO2 99%   BMI 33.47 kg/m     Constitutional: AO, pleasant, NAD  Resp: Non-labored breathing on room air  Abd: Soft, NT, ND  MARIAH: Deferred     Labs  Lab Results   Component Value Date    WBC 7.5 03/25/2025    WBC 7.8 09/02/2019     Lab Results   Component Value Date    RBC 5.52 03/25/2025    RBC 4.76 09/02/2019     Lab Results   Component Value Date    HGB 15.8 03/25/2025    HGB 13.8 09/02/2019     Lab Results   Component Value Date    HCT 48.6 03/25/2025    HCT 42.8 09/02/2019     No components found for: \"MCT\"  Lab Results   Component Value Date    MCV 88 03/25/2025    MCV 90 09/02/2019     Lab Results   Component Value Date    MCH 28.6 03/25/2025    MCH 29.0 09/02/2019     Lab Results   Component Value Date    MCHC 32.5 03/25/2025    MCHC 32.2 09/02/2019     Lab Results   Component Value Date    RDW 13.4 03/25/2025    RDW 12.8 09/02/2019     Lab Results   Component Value Date     03/25/2025     09/02/2019        Last Comprehensive Metabolic Panel:  Sodium   Date Value Ref Range Status   03/25/2025 141 135 - 145 mmol/L Final   09/02/2019 142 133 - 144 mmol/L Final     Potassium   Date Value Ref Range Status   03/25/2025 5.4 (H) 3.4 - 5.3 mmol/L Final   06/20/2022 3.9 3.4 - 5.3 mmol/L Final   09/02/2019 " 4.5 3.4 - 5.3 mmol/L Final     Chloride   Date Value Ref Range Status   03/25/2025 105 98 - 107 mmol/L Final   06/20/2022 108 94 - 109 mmol/L Final   09/02/2019 112 (H) 94 - 109 mmol/L Final     Carbon Dioxide   Date Value Ref Range Status   09/02/2019 23 20 - 32 mmol/L Final     Carbon Dioxide (CO2)   Date Value Ref Range Status   03/25/2025 25 22 - 29 mmol/L Final   06/20/2022 24 20 - 32 mmol/L Final     Anion Gap   Date Value Ref Range Status   03/25/2025 11 7 - 15 mmol/L Final   06/20/2022 6 3 - 14 mmol/L Final   09/02/2019 7 3 - 14 mmol/L Final     Glucose   Date Value Ref Range Status   03/25/2025 111 (H) 70 - 99 mg/dL Final   06/20/2022 96 70 - 99 mg/dL Final   09/02/2019 109 (H) 70 - 99 mg/dL Final     Urea Nitrogen   Date Value Ref Range Status   03/25/2025 16.4 8.0 - 23.0 mg/dL Final   06/20/2022 16 7 - 30 mg/dL Final   09/02/2019 33 (H) 7 - 30 mg/dL Final     Creatinine   Date Value Ref Range Status   03/25/2025 1.22 (H) 0.67 - 1.17 mg/dL Final   09/02/2019 0.85 0.66 - 1.25 mg/dL Final     GFR Estimate   Date Value Ref Range Status   03/25/2025 67 >60 mL/min/1.73m2 Final     Comment:     eGFR calculated using 2021 CKD-EPI equation.   09/02/2019 >90 >60 mL/min/[1.73_m2] Final     Comment:     Non  GFR Calc  Starting 12/18/2018, serum creatinine based estimated GFR (eGFR) will be   calculated using the Chronic Kidney Disease Epidemiology Collaboration   (CKD-EPI) equation.       Calcium   Date Value Ref Range Status   03/25/2025 9.7 8.8 - 10.4 mg/dL Final   09/02/2019 7.9 (L) 8.5 - 10.1 mg/dL Final     Bilirubin Total   Date Value Ref Range Status   03/25/2025 0.6 <=1.2 mg/dL Final   09/02/2019 0.5 0.2 - 1.3 mg/dL Final     Alkaline Phosphatase   Date Value Ref Range Status   03/25/2025 114 40 - 150 U/L Final   09/02/2019 76 40 - 150 U/L Final     ALT   Date Value Ref Range Status   03/25/2025 29 0 - 70 U/L Final   09/02/2019 25 0 - 70 U/L Final     AST   Date Value Ref Range Status    03/25/2025 26 0 - 45 U/L Final   09/02/2019 16 0 - 45 U/L Final       PSA   Date Value Ref Range Status   12/27/2019 4.76 (H) 0 - 4 ug/L Final     Comment:     Assay Method:  Chemiluminescence using Siemens Vista analyzer     Prostate Specific Antigen Screen   Date Value Ref Range Status   03/25/2025 0.04 0.00 - 4.50 ng/mL Final   06/29/2023 5.11 (H) 0.00 - 4.50 ng/mL Final   06/20/2022 4.58 (H) 0.00 - 4.00 ug/L Final     PSA Tumor Marker   Date Value Ref Range Status   03/25/2025 0.04 0.00 - 4.50 ng/mL Final   06/04/2024 <0.01 0.00 - 4.50 ng/mL Final   10/10/2023 5.27 (H) 0.00 - 4.50 ng/mL Final          Imaging   No new imaging to review today     ASSESSMENT AND PLAN  64 year old male for followup of pT3a N0 prostatic adenocarcinoma status post robotic radical prostatectomy and pelvic node dissection on 2/9/2024 doing well slight increase in PSA    I reassured Shon that slight increase in PSA number does not indicate any need for any intervention including adjuvant radiation.  Will survey his PSA closely and would consider adjuvant radiation if PSA doubling time is less than 6 months.  He is agreeable with the plan.      Plan   Follow-up in 3 months with PSA prior    40 total minutes spent on the date of the encounter including direct interaction with the patient, performing chart review, documentation and further activities as noted above    Elizabeth Diop MD   Department of Urology   [facility]                 Again, thank you for allowing me to participate in the care of your patient.      Sincerely,    Elizabeth Diop MD

## 2025-04-23 NOTE — PROGRESS NOTES
"Urology Clinic     HPI  Shon Sargent is a 64 year old male with history of prostate cancer sp RALP and PLND, here for follow-up.      The patient denies any significant changes to health, hospitalizations or new diagnoses in the interim.     PHYSICAL EXAM  /83 (BP Location: Right arm, Patient Position: Sitting, Cuff Size: Adult Large)   Pulse 97   Ht 1.803 m (5' 11\")   Wt 108.9 kg (240 lb)   SpO2 99%   BMI 33.47 kg/m     Constitutional: AO, pleasant, NAD  Resp: Non-labored breathing on room air  Abd: Soft, NT, ND  MARIAH: Deferred     Labs  Lab Results   Component Value Date    WBC 7.5 03/25/2025    WBC 7.8 09/02/2019     Lab Results   Component Value Date    RBC 5.52 03/25/2025    RBC 4.76 09/02/2019     Lab Results   Component Value Date    HGB 15.8 03/25/2025    HGB 13.8 09/02/2019     Lab Results   Component Value Date    HCT 48.6 03/25/2025    HCT 42.8 09/02/2019     No components found for: \"MCT\"  Lab Results   Component Value Date    MCV 88 03/25/2025    MCV 90 09/02/2019     Lab Results   Component Value Date    MCH 28.6 03/25/2025    MCH 29.0 09/02/2019     Lab Results   Component Value Date    MCHC 32.5 03/25/2025    MCHC 32.2 09/02/2019     Lab Results   Component Value Date    RDW 13.4 03/25/2025    RDW 12.8 09/02/2019     Lab Results   Component Value Date     03/25/2025     09/02/2019        Last Comprehensive Metabolic Panel:  Sodium   Date Value Ref Range Status   03/25/2025 141 135 - 145 mmol/L Final   09/02/2019 142 133 - 144 mmol/L Final     Potassium   Date Value Ref Range Status   03/25/2025 5.4 (H) 3.4 - 5.3 mmol/L Final   06/20/2022 3.9 3.4 - 5.3 mmol/L Final   09/02/2019 4.5 3.4 - 5.3 mmol/L Final     Chloride   Date Value Ref Range Status   03/25/2025 105 98 - 107 mmol/L Final   06/20/2022 108 94 - 109 mmol/L Final   09/02/2019 112 (H) 94 - 109 mmol/L Final     Carbon Dioxide   Date Value Ref Range Status   09/02/2019 23 20 - 32 mmol/L Final     Carbon Dioxide (CO2) "   Date Value Ref Range Status   03/25/2025 25 22 - 29 mmol/L Final   06/20/2022 24 20 - 32 mmol/L Final     Anion Gap   Date Value Ref Range Status   03/25/2025 11 7 - 15 mmol/L Final   06/20/2022 6 3 - 14 mmol/L Final   09/02/2019 7 3 - 14 mmol/L Final     Glucose   Date Value Ref Range Status   03/25/2025 111 (H) 70 - 99 mg/dL Final   06/20/2022 96 70 - 99 mg/dL Final   09/02/2019 109 (H) 70 - 99 mg/dL Final     Urea Nitrogen   Date Value Ref Range Status   03/25/2025 16.4 8.0 - 23.0 mg/dL Final   06/20/2022 16 7 - 30 mg/dL Final   09/02/2019 33 (H) 7 - 30 mg/dL Final     Creatinine   Date Value Ref Range Status   03/25/2025 1.22 (H) 0.67 - 1.17 mg/dL Final   09/02/2019 0.85 0.66 - 1.25 mg/dL Final     GFR Estimate   Date Value Ref Range Status   03/25/2025 67 >60 mL/min/1.73m2 Final     Comment:     eGFR calculated using 2021 CKD-EPI equation.   09/02/2019 >90 >60 mL/min/[1.73_m2] Final     Comment:     Non  GFR Calc  Starting 12/18/2018, serum creatinine based estimated GFR (eGFR) will be   calculated using the Chronic Kidney Disease Epidemiology Collaboration   (CKD-EPI) equation.       Calcium   Date Value Ref Range Status   03/25/2025 9.7 8.8 - 10.4 mg/dL Final   09/02/2019 7.9 (L) 8.5 - 10.1 mg/dL Final     Bilirubin Total   Date Value Ref Range Status   03/25/2025 0.6 <=1.2 mg/dL Final   09/02/2019 0.5 0.2 - 1.3 mg/dL Final     Alkaline Phosphatase   Date Value Ref Range Status   03/25/2025 114 40 - 150 U/L Final   09/02/2019 76 40 - 150 U/L Final     ALT   Date Value Ref Range Status   03/25/2025 29 0 - 70 U/L Final   09/02/2019 25 0 - 70 U/L Final     AST   Date Value Ref Range Status   03/25/2025 26 0 - 45 U/L Final   09/02/2019 16 0 - 45 U/L Final       PSA   Date Value Ref Range Status   12/27/2019 4.76 (H) 0 - 4 ug/L Final     Comment:     Assay Method:  Chemiluminescence using Siemens Vista analyzer     Prostate Specific Antigen Screen   Date Value Ref Range Status   03/25/2025 0.04  0.00 - 4.50 ng/mL Final   06/29/2023 5.11 (H) 0.00 - 4.50 ng/mL Final   06/20/2022 4.58 (H) 0.00 - 4.00 ug/L Final     PSA Tumor Marker   Date Value Ref Range Status   03/25/2025 0.04 0.00 - 4.50 ng/mL Final   06/04/2024 <0.01 0.00 - 4.50 ng/mL Final   10/10/2023 5.27 (H) 0.00 - 4.50 ng/mL Final          Imaging   No new imaging to review today     ASSESSMENT AND PLAN  64 year old male for followup of pT3a N0 prostatic adenocarcinoma status post robotic radical prostatectomy and pelvic node dissection on 2/9/2024 doing well slight increase in PSA    I reassured Shon that slight increase in PSA number does not indicate any need for any intervention including adjuvant radiation.  Will survey his PSA closely and would consider adjuvant radiation if PSA doubling time is less than 6 months.  He is agreeable with the plan.      Plan   Follow-up in 3 months with PSA prior    40 total minutes spent on the date of the encounter including direct interaction with the patient, performing chart review, documentation and further activities as noted above    Elizabeth Diop MD   Department of Urology   [facility]

## 2025-06-23 ENCOUNTER — LAB (OUTPATIENT)
Dept: LAB | Facility: CLINIC | Age: 65
End: 2025-06-23
Payer: COMMERCIAL

## 2025-06-23 DIAGNOSIS — Z00.00 ROUTINE GENERAL MEDICAL EXAMINATION AT A HEALTH CARE FACILITY: ICD-10-CM

## 2025-06-23 DIAGNOSIS — Z11.4 SCREENING FOR HIV (HUMAN IMMUNODEFICIENCY VIRUS): Primary | ICD-10-CM

## 2025-06-23 DIAGNOSIS — C61 PROSTATE CANCER (H): ICD-10-CM

## 2025-06-23 LAB
ANION GAP SERPL CALCULATED.3IONS-SCNC: 13 MMOL/L (ref 7–15)
BUN SERPL-MCNC: 16.6 MG/DL (ref 8–23)
CALCIUM SERPL-MCNC: 9.6 MG/DL (ref 8.8–10.4)
CHLORIDE SERPL-SCNC: 106 MMOL/L (ref 98–107)
CREAT SERPL-MCNC: 1.08 MG/DL (ref 0.67–1.17)
EGFRCR SERPLBLD CKD-EPI 2021: 77 ML/MIN/1.73M2
GLUCOSE SERPL-MCNC: 116 MG/DL (ref 70–99)
HCO3 SERPL-SCNC: 22 MMOL/L (ref 22–29)
HIV 1+2 AB+HIV1 P24 AG SERPL QL IA: NONREACTIVE
POTASSIUM SERPL-SCNC: 4.1 MMOL/L (ref 3.4–5.3)
PSA SERPL DL<=0.01 NG/ML-MCNC: 0.06 NG/ML (ref 0–4.5)
SODIUM SERPL-SCNC: 141 MMOL/L (ref 135–145)

## 2025-06-23 PROCEDURE — 80048 BASIC METABOLIC PNL TOTAL CA: CPT

## 2025-06-23 PROCEDURE — 84153 ASSAY OF PSA TOTAL: CPT

## 2025-06-23 PROCEDURE — 36415 COLL VENOUS BLD VENIPUNCTURE: CPT

## 2025-06-23 PROCEDURE — 87389 HIV-1 AG W/HIV-1&-2 AB AG IA: CPT

## 2025-06-26 ENCOUNTER — RESULTS FOLLOW-UP (OUTPATIENT)
Dept: FAMILY MEDICINE | Facility: CLINIC | Age: 65
End: 2025-06-26

## 2025-07-01 ENCOUNTER — PRE VISIT (OUTPATIENT)
Dept: UROLOGY | Facility: CLINIC | Age: 65
End: 2025-07-01
Payer: COMMERCIAL

## 2025-07-01 ENCOUNTER — TELEPHONE (OUTPATIENT)
Dept: UROLOGY | Facility: CLINIC | Age: 65
End: 2025-07-01
Payer: COMMERCIAL

## 2025-07-01 NOTE — TELEPHONE ENCOUNTER
Will schedule for 8/1/25 at 720 am    Miesha Hatch, RN, BSN, PHN  Care Coordinator Urology  [facility] [place]  [facility]  [phone]

## 2025-07-01 NOTE — TELEPHONE ENCOUNTER
----- Message from Elizabeth Diop sent at 6/27/2025  1:16 PM CDT -----  Hi    Another patient for Gregory to see. Thanks much

## 2025-07-01 NOTE — TELEPHONE ENCOUNTER
Reason for visit: New patient     Dx/Hx/Sx: radical prostatectomy done 2/29/25    Records/imaging/labs/orders: in epic    At Rooming: Michelle Bond  7/1/2025  12:55 PM

## 2025-07-02 ENCOUNTER — TELEPHONE (OUTPATIENT)
Dept: UROLOGY | Facility: CLINIC | Age: 65
End: 2025-07-02
Payer: COMMERCIAL

## 2025-07-02 NOTE — TELEPHONE ENCOUNTER
Patient confirmed scheduled appointment:  Date: 8/7  Time: 7:20am  Visit type: new urology  Provider: Dr. Carlin  [place] [facility]  Testing/imaging: N/a  Additional notes:

## 2025-07-02 NOTE — TELEPHONE ENCOUNTER
----- Message from José BUREKTT sent at 7/1/2025 12:53 PM CDT -----  Regarding: ED scheduling  To whom it may concern,    Could someone please schedule this patient for a visit with    @ 8/7/25, at 7:20am?     This would be a new patient, as they were seeing  for a radical prostatectomy, with ED post procedural. Please let me know if theres anything else I can do to help!    José Bond

## 2025-07-21 ENCOUNTER — PRE VISIT (OUTPATIENT)
Dept: UROLOGY | Facility: CLINIC | Age: 65
End: 2025-07-21
Payer: COMMERCIAL

## 2025-07-21 NOTE — TELEPHONE ENCOUNTER
Reason for visit: ED post procedure     Relevant information: DOS 02/29/2025    Records/imaging/labs/orders: All records available     Pt called: No need for a call    At Rooming: elena De Souza  7/21/2025  1:56 PM

## (undated) DEVICE — SU DERMABOND ADVANCED .7ML DNX12

## (undated) DEVICE — LINEN TOWEL PACK X5 5464

## (undated) DEVICE — SUCTION MANIFOLD NEPTUNE 2 SYS 4 PORT 0702-020-000

## (undated) DEVICE — SOL NACL 0.9% IRRIG 1000ML BOTTLE 07138-09

## (undated) DEVICE — ESU PENCIL W/COATED BLADE E2450H

## (undated) DEVICE — SOL WATER IRRIG 1000ML BOTTLE 2F7114

## (undated) DEVICE — DECANTER VIAL 2006S

## (undated) DEVICE — SOL NACL 0.9% INJ 1000ML BAG 2B1324X

## (undated) DEVICE — BIOPSY VALVE BIOSHIELD 00711135

## (undated) DEVICE — GLOVE BIOGEL PI MICRO SZ 7.5 48575

## (undated) DEVICE — ENDO POUCH UNIV RETRIEVAL SYSTEM INZII 10MM CD001

## (undated) DEVICE — ESU ELEC CLEANCOAT LAP FLAT L-HOOK 36CM E3774-36C

## (undated) DEVICE — CLIP ENDO HEMO-LOC PURPLE LG 544240

## (undated) DEVICE — WIRE GUIDE 0.025"X270CM STR VISIGLIDE G-240-2527S

## (undated) DEVICE — SPONGE RAY-TEC 4X8" 7318

## (undated) DEVICE — DAVINCI XI DRIVER NDL LARGE 8MM EXT 471006

## (undated) DEVICE — DRAPE POUCH INSTRUMENT 3 POCKET 1018L

## (undated) DEVICE — SUCTION MANIFOLD NEPTUNE 2 SYS 1 PORT 702-025-000

## (undated) DEVICE — GLOVE BIOGEL PI MICRO SZ 7.0 48570

## (undated) DEVICE — SU MONOCRYL 4-0 PS-2 18" UND Y496G

## (undated) DEVICE — DAVINCI XI DRAPE ARM 470015

## (undated) DEVICE — DRAIN JACKSON PRATT RESERVOIR 100ML SU130-1305

## (undated) DEVICE — CATH TRAY FOLEY SURESTEP 16FR WDRAIN BAG STLK LATEX A300316A

## (undated) DEVICE — PACK ENDOSCOPY GI CUSTOM UMMC

## (undated) DEVICE — GUIDEWIRE NOVAGOLD .018X260CM STR TIP M00552000

## (undated) DEVICE — ENDO TUBING CO2 SMARTCAP STERILE DISP 100145CO2EXT

## (undated) DEVICE — SOL NACL 0.9% IRRIG 3000ML BAG 07972-08

## (undated) DEVICE — ENDO TROCAR FIRST ENTRY KII FIOS ADV FIX 05X100MM CFF03

## (undated) DEVICE — ENDO TROCAR SLEEVE KII ADV FIXATION 05X100MM CFS02

## (undated) DEVICE — DAVINCI XI GRASPER PROGRASP 8MM EXT 471093

## (undated) DEVICE — STOCKING SLEEVE COMPRESSION CALF LG

## (undated) DEVICE — PACK DAVINCI UROLOGY SBA15UDFSG

## (undated) DEVICE — SU VICRYL 0 UR-6 27" J603H

## (undated) DEVICE — CLIP APPLIER ENDO ROTATING 10MM MED/LG ER320

## (undated) DEVICE — ESU GROUND PAD UNIVERSAL W/O CORD

## (undated) DEVICE — TUBING CONMED AIRSEAL SMOKE EVAC INSUFFLATION ASM-EVAC

## (undated) DEVICE — GOWN XLG DISP 9545

## (undated) DEVICE — GLOVE BIOGEL PI MICRO INDICATOR UNDERGLOVE SZ 7.5 48975

## (undated) DEVICE — PREP CHLORAPREP 26ML TINTED ORANGE  260815

## (undated) DEVICE — SU ETHILON 2-0 FS 18" 664G

## (undated) DEVICE — BITE BLOCK BLOX ADJ STRAP 60FR SBT-546-100

## (undated) DEVICE — CATH RETRIEVAL BALLOON EXTRACTOR PRO RX-S INJ ABOVE 9-12MM

## (undated) DEVICE — GOWN XXLG REINFORCED 9071EL

## (undated) DEVICE — SU WND CLOSURE VLOC 90 ABS 3-0 VIOLET 6" CV-23 VLOCM0804

## (undated) DEVICE — KIT ENDO FIRST STEP DISINFECTANT 200ML W/POUCH EP-4

## (undated) DEVICE — GLOVE BIOGEL PI ULTRATOUCH G SZ 7.5 42175

## (undated) DEVICE — RX SURGIFLO HEMOSTATIC MATRIX W/THROMBIN 8ML 2994

## (undated) DEVICE — ENDO FUSION OMNI-TOME G31903

## (undated) DEVICE — KIT PATIENT POSITIONING PIGAZZI LATEX FREE 40580

## (undated) DEVICE — SURGICEL HEMOSTAT 2X14" 1951

## (undated) DEVICE — SPONGE PEANUT

## (undated) DEVICE — ANTIFOG SOLUTION SEE SHARP 150M TROCAR SWABS 30978

## (undated) DEVICE — Device

## (undated) DEVICE — SOL WATER IRRIG 1000ML BOTTLE 07139-09

## (undated) DEVICE — DAVINCI HOT SHEARS TIP COVER  400180

## (undated) DEVICE — DAVINCI XI SEAL UNIVERSAL 5-8MM 470361

## (undated) DEVICE — ENDO TROCAR FIRST ENTRY KII FIOS ADV FIX 11X100MM CFF33

## (undated) DEVICE — ESU ENDO SCISSORS 5MM CVD 5DCS

## (undated) DEVICE — ENDO TROCAR CONMED AIRSEAL BLADELESS 12X120MM IAS12-120LP

## (undated) DEVICE — SU WND CLOSURE V-LOC 3-0 CV-23 6" VLOCM1904

## (undated) DEVICE — SU WND CLOSURE VLOC 180 ABS 2-0 12" GS-21 VLOCL0315

## (undated) DEVICE — ENDO DEVICE LOCKING AND BIOPSY CAP M00545261

## (undated) DEVICE — DAVINCI XI MONOPOLAR SCISSORS HOT SHEARS 8MM 470179

## (undated) DEVICE — NDL INSUFFLATION 13GA 120MM C2201

## (undated) DEVICE — DAVINCI XI DRAPE COLUMN 470341

## (undated) DEVICE — ESU GROUND PAD ADULT W/CORD E7507

## (undated) DEVICE — SUCTION IRR STRYKERFLOW II W/TIP 250-070-520

## (undated) DEVICE — ESU HOLSTER PLASTIC DISP E2400

## (undated) DEVICE — DRAIN JACKSON PRATT CHANNEL 19FR ROUND HUBLESS SIL JP-2230

## (undated) DEVICE — SURGICEL HEMOSTAT 4X8" 1952

## (undated) DEVICE — DAVINCI XI FCP BIPOLAR FENESTRATED 470205

## (undated) DEVICE — PROTECTOR ARM ONE-STEP TRENDELENBURG 40418

## (undated) DEVICE — GLOVE BIOGEL PI MICRO INDICATOR UNDERGLOVE SZ 8.0 48980

## (undated) RX ORDER — ACETAMINOPHEN 325 MG/1
TABLET ORAL
Status: DISPENSED
Start: 2024-06-14

## (undated) RX ORDER — PROPOFOL 10 MG/ML
INJECTION, EMULSION INTRAVENOUS
Status: DISPENSED
Start: 2024-06-18

## (undated) RX ORDER — LIDOCAINE HYDROCHLORIDE 10 MG/ML
INJECTION, SOLUTION EPIDURAL; INFILTRATION; INTRACAUDAL; PERINEURAL
Status: DISPENSED
Start: 2024-01-17

## (undated) RX ORDER — FENTANYL CITRATE 50 UG/ML
INJECTION, SOLUTION INTRAMUSCULAR; INTRAVENOUS
Status: DISPENSED
Start: 2024-06-18

## (undated) RX ORDER — ONDANSETRON 2 MG/ML
INJECTION INTRAMUSCULAR; INTRAVENOUS
Status: DISPENSED
Start: 2024-06-14

## (undated) RX ORDER — FENTANYL CITRATE 50 UG/ML
INJECTION, SOLUTION INTRAMUSCULAR; INTRAVENOUS
Status: DISPENSED
Start: 2024-06-14

## (undated) RX ORDER — GLYCOPYRROLATE 0.2 MG/ML
INJECTION, SOLUTION INTRAMUSCULAR; INTRAVENOUS
Status: DISPENSED
Start: 2024-06-14

## (undated) RX ORDER — MAGNESIUM SULFATE HEPTAHYDRATE 40 MG/ML
INJECTION, SOLUTION INTRAVENOUS
Status: DISPENSED
Start: 2024-06-14

## (undated) RX ORDER — HYDROMORPHONE HCL IN WATER/PF 6 MG/30 ML
PATIENT CONTROLLED ANALGESIA SYRINGE INTRAVENOUS
Status: DISPENSED
Start: 2024-02-29

## (undated) RX ORDER — DEXAMETHASONE SODIUM PHOSPHATE 4 MG/ML
INJECTION, SOLUTION INTRA-ARTICULAR; INTRALESIONAL; INTRAMUSCULAR; INTRAVENOUS; SOFT TISSUE
Status: DISPENSED
Start: 2024-02-29

## (undated) RX ORDER — ONDANSETRON 2 MG/ML
INJECTION INTRAMUSCULAR; INTRAVENOUS
Status: DISPENSED
Start: 2024-06-18

## (undated) RX ORDER — HYDROMORPHONE HYDROCHLORIDE 1 MG/ML
INJECTION, SOLUTION INTRAMUSCULAR; INTRAVENOUS; SUBCUTANEOUS
Status: DISPENSED
Start: 2024-02-29

## (undated) RX ORDER — BUPIVACAINE HYDROCHLORIDE 5 MG/ML
INJECTION, SOLUTION EPIDURAL; INTRACAUDAL
Status: DISPENSED
Start: 2024-06-14

## (undated) RX ORDER — GENTAMICIN 40 MG/ML
INJECTION, SOLUTION INTRAMUSCULAR; INTRAVENOUS
Status: DISPENSED
Start: 2024-01-17

## (undated) RX ORDER — HEPARIN SODIUM 5000 [USP'U]/.5ML
INJECTION, SOLUTION INTRAVENOUS; SUBCUTANEOUS
Status: DISPENSED
Start: 2024-06-14

## (undated) RX ORDER — LIDOCAINE HYDROCHLORIDE AND EPINEPHRINE 10; 10 MG/ML; UG/ML
INJECTION, SOLUTION INFILTRATION; PERINEURAL
Status: DISPENSED
Start: 2024-06-14

## (undated) RX ORDER — PROPOFOL 10 MG/ML
INJECTION, EMULSION INTRAVENOUS
Status: DISPENSED
Start: 2024-02-29

## (undated) RX ORDER — SCOLOPAMINE TRANSDERMAL SYSTEM 1 MG/1
PATCH, EXTENDED RELEASE TRANSDERMAL
Status: DISPENSED
Start: 2024-06-14

## (undated) RX ORDER — PROPOFOL 10 MG/ML
INJECTION, EMULSION INTRAVENOUS
Status: DISPENSED
Start: 2024-06-14

## (undated) RX ORDER — CEFAZOLIN SODIUM/WATER 2 G/20 ML
SYRINGE (ML) INTRAVENOUS
Status: DISPENSED
Start: 2024-02-29

## (undated) RX ORDER — HEPARIN SODIUM 5000 [USP'U]/.5ML
INJECTION, SOLUTION INTRAVENOUS; SUBCUTANEOUS
Status: DISPENSED
Start: 2024-02-29

## (undated) RX ORDER — HYDROCODONE BITARTRATE AND ACETAMINOPHEN 5; 325 MG/1; MG/1
TABLET ORAL
Status: DISPENSED
Start: 2024-06-14

## (undated) RX ORDER — VECURONIUM BROMIDE 1 MG/ML
INJECTION, POWDER, LYOPHILIZED, FOR SOLUTION INTRAVENOUS
Status: DISPENSED
Start: 2024-02-29

## (undated) RX ORDER — DEXAMETHASONE SODIUM PHOSPHATE 4 MG/ML
INJECTION, SOLUTION INTRA-ARTICULAR; INTRALESIONAL; INTRAMUSCULAR; INTRAVENOUS; SOFT TISSUE
Status: DISPENSED
Start: 2024-06-14

## (undated) RX ORDER — FENTANYL CITRATE 50 UG/ML
INJECTION, SOLUTION INTRAMUSCULAR; INTRAVENOUS
Status: DISPENSED
Start: 2024-02-29

## (undated) RX ORDER — LIDOCAINE HYDROCHLORIDE 10 MG/ML
INJECTION, SOLUTION EPIDURAL; INFILTRATION; INTRACAUDAL; PERINEURAL
Status: DISPENSED
Start: 2024-06-14

## (undated) RX ORDER — FENTANYL CITRATE 0.05 MG/ML
INJECTION, SOLUTION INTRAMUSCULAR; INTRAVENOUS
Status: DISPENSED
Start: 2024-02-29

## (undated) RX ORDER — ONDANSETRON 2 MG/ML
INJECTION INTRAMUSCULAR; INTRAVENOUS
Status: DISPENSED
Start: 2024-02-29